# Patient Record
Sex: MALE | Race: WHITE | Employment: FULL TIME | URBAN - METROPOLITAN AREA
[De-identification: names, ages, dates, MRNs, and addresses within clinical notes are randomized per-mention and may not be internally consistent; named-entity substitution may affect disease eponyms.]

---

## 2017-01-06 RX ORDER — METFORMIN HYDROCHLORIDE 500 MG/1
TABLET, EXTENDED RELEASE ORAL
Qty: 30 TAB | Refills: 2 | Status: SHIPPED | OUTPATIENT
Start: 2017-01-06 | End: 2018-02-21 | Stop reason: ALTCHOICE

## 2017-07-24 ENCOUNTER — OFFICE VISIT (OUTPATIENT)
Dept: INTERNAL MEDICINE CLINIC | Age: 42
End: 2017-07-24

## 2017-07-24 VITALS
OXYGEN SATURATION: 97 % | SYSTOLIC BLOOD PRESSURE: 142 MMHG | WEIGHT: 315 LBS | HEIGHT: 73 IN | DIASTOLIC BLOOD PRESSURE: 86 MMHG | TEMPERATURE: 98.5 F | RESPIRATION RATE: 15 BRPM | HEART RATE: 80 BPM | BODY MASS INDEX: 41.75 KG/M2

## 2017-07-24 DIAGNOSIS — J20.9 BRONCHITIS WITH BRONCHOSPASM: Primary | ICD-10-CM

## 2017-07-24 DIAGNOSIS — R05.9 COUGH: ICD-10-CM

## 2017-07-24 RX ORDER — PREDNISONE 20 MG/1
TABLET ORAL
Qty: 13 TAB | Refills: 0 | Status: SHIPPED | OUTPATIENT
Start: 2017-07-24 | End: 2018-02-21 | Stop reason: ALTCHOICE

## 2017-07-24 RX ORDER — BENZONATATE 200 MG/1
200 CAPSULE ORAL
Qty: 21 CAP | Refills: 0 | Status: SHIPPED | OUTPATIENT
Start: 2017-07-24 | End: 2017-07-31

## 2017-07-24 RX ORDER — ALBUTEROL SULFATE 90 UG/1
1 AEROSOL, METERED RESPIRATORY (INHALATION)
Qty: 1 INHALER | Refills: 0 | Status: SHIPPED | OUTPATIENT
Start: 2017-07-24 | End: 2017-08-03

## 2017-07-24 NOTE — PROGRESS NOTES
Written by Marni Cole, as dictated by Dr. Carmine Khan MD.    Elena Walden is a 36 y.o. male. HPI  The patient comes in today c/o cough, causing a headache. He started on a Z-nicolasa last week, but he started running a fever 2 days after starting. He finished the Z-nicolasa on Saturday. He has tried a week of Mucinex, Sudafed, Robitussin, tessalon perles, neti pot TID, which helped for a little bit sxs always returned. He sometimes experiences a choking sensation during a coughing fit. He has not been taking Zyrtec or any other OTC allergy medication. He experienced wheezing, which went away after he finished the Z-nicolasa. He has gained weight since his last visit, from 340 lbs in 11/2016 to 355 lbs today. He spends a lot of time at his desk, and feels very tired lately. Patient Active Problem List   Diagnosis Code    Essential hypertension I10    Type 2 diabetes mellitus without complication ([facility] H34.5    Morbid obesity ([facility] E66.01        Current Outpatient Prescriptions on File Prior to Visit   Medication Sig Dispense Refill    metFORMIN ER (GLUCOPHAGE XR) 500 mg tablet TAKE 1 TABLET BY MOUTH DAILY. 30 Tab 2    multivitamin (ONE A DAY) tablet Take 1 Tab by mouth daily.  metFORMIN ER (GLUCOPHAGE XR) 500 mg tablet TAKE 1 TABLET BY MOUTH DAILY. 30 Tab 2    VIT B COMP-C-FA-IRON-VIT E PO Take  by mouth. No current facility-administered medications on file prior to visit.         No Known Allergies    Past Medical History:   Diagnosis Date    Diabetes ([facility]     Hypertension        Past Surgical History:   Procedure Laterality Date    HX TONSILLECTOMY  200       Family History   Problem Relation Age of Onset    Hypertension Mother     Headache Mother     Headache Sister     Diabetes Maternal Grandfather        Social History     Social History    Marital status:      Spouse name: N/A    Number of children: N/A    Years of education: N/A Occupational History    Not on file. Social History Main Topics    Smoking status: Never Smoker    Smokeless tobacco: Never Used    Alcohol use No    Drug use: No    Sexual activity: Yes     Partners: Female     Other Topics Concern    Not on file     Social History Narrative         Review of Systems   Constitutional: Negative for malaise/fatigue. HENT: Negative for congestion. Respiratory: Positive for cough. Negative for shortness of breath and wheezing. Musculoskeletal: Negative for joint pain and myalgias. Neurological: Negative for weakness. Visit Vitals    /86 (BP 1 Location: Left arm, BP Patient Position: Sitting)    Pulse 80    Temp 98.5 °F (36.9 °C) (Oral)    Resp 15    Ht 6' 1\" (1.854 m)    Wt (!) 355 lb 12.8 oz (161.4 kg)    SpO2 97%    BMI 46.94 kg/m2       Physical Exam   Constitutional: He is oriented to person, place, and time. He appears well-developed. No distress. Morbidly obese   HENT:   Right Ear: External ear normal.   Left Ear: External ear normal.   Eyes: Conjunctivae and EOM are normal. Right eye exhibits no discharge. Left eye exhibits no discharge. Neck: Normal range of motion. Neck supple. Cardiovascular: Normal rate and regular rhythm. Pulmonary/Chest: Effort normal. He has no wheezes. BL decreased breath sounds. No rhonchi. Abdominal: Soft. Bowel sounds are normal. There is no tenderness. Lymphadenopathy:     He has no cervical adenopathy. Neurological: He is alert and oriented to person, place, and time. Skin: He is not diaphoretic. Psychiatric: He has a normal mood and affect. His behavior is normal.   Nursing note and vitals reviewed. ASSESSMENT and PLAN    ICD-10-CM ICD-9-CM    1. Bronchitis with bronchospasm J20.9 490 predniSONE (DELTASONE) 20 mg tablet sent to pharmacy      albuterol (PROVENTIL HFA, VENTOLIN HFA, PROAIR HFA) 90 mcg/actuation inhaler sent to pharmacy   2.  Cough R05 786.2 benzonatate (TESSALON) 200 mg capsule sent to pharmacy    I want him to start on an albuterol inhaler BID to TID. Pt just finished a Z-nicolasa 2 days ago, and is experiencing post-infectious cough due to inflammation of bronchioles, so we will give him steroids as he still has some chest tightness. and steroids to open up his lungs. I want the patient to take the medication po as follows: 3 pills x 1 day, 2 pills x 2 days, 1 pill x 2 days and 1/2 pill x 1 day. The patient was advised to take this medication with food. He should also take Zyrtec every day for 2 weeks and continue to take tessalon perles. This plan was reviewed with the patient and patient agrees. All questions were answered. This scribe documentation was reviewed by me and accurately reflects the examination and decisions made by me. This note will not be viewable in [address]

## 2017-07-24 NOTE — PROGRESS NOTES
Chief Complaint   Patient presents with    Cough     states that he has had a cough for seven weeks, has tried sudafed robitussin and saw the tele doc and started a z nicolasa and it has not gotten better. running a low grade fever almost every evening. has a lot of drainage and headache from coughing so much.

## 2017-07-24 NOTE — MR AVS SNAPSHOT
Visit Information Date & Time Provider Department Dept. Phone Encounter #  
 7/24/2017 12:45 PM Rosa Cho MD [facility] Internal Medicine [phone] [id number] Upcoming Health Maintenance Date Due  
 EYE EXAM RETINAL OR DILATED Q1 7/16/1984 DTaP/Tdap/Td series (1 - Tdap) 7/16/1995 FOOT EXAM Q1 11/20/2016 HEMOGLOBIN A1C Q6M 3/19/2017 INFLUENZA AGE 9 TO ADULT 8/1/2017 LIPID PANEL Q1 9/19/2017 MICROALBUMIN Q1 11/14/2017 Allergies as of 7/24/2017  Review Complete On: 7/24/2017 By: Rosa Cho MD  
 No Known Allergies Current Immunizations  Never Reviewed Name Date Influenza Vaccine Intradermal PF 11/20/2015 Not reviewed this visit You Were Diagnosed With   
  
 Codes Comments Bronchitis with bronchospasm    -  Primary ICD-10-CM: J20.9 ICD-9-CM: 819 Cough     ICD-10-CM: R05 ICD-9-CM: 640. 2 Vitals BP Pulse Temp Resp Height(growth percentile) Weight(growth percentile) 142/86 (BP 1 Location: Left arm, BP Patient Position: Sitting) 80 98.5 °F (36.9 °C) (Oral) 15 6' 1\" (1.854 m) (!) 355 lb 12.8 oz (161.4 kg) SpO2 BMI Smoking Status 97% 46.94 kg/m2 Never Smoker BMI and BSA Data Body Mass Index Body Surface Area 46.94 kg/m 2 2.88 m 2 Preferred Pharmacy Pharmacy Name Phone [id number] [place] [address] [phone] Your Updated Medication List  
  
   
This list is accurate as of: 7/24/17  1:25 PM.  Always use your most recent med list.  
  
  
  
  
 albuterol 90 mcg/actuation inhaler Commonly known as:  PROVENTIL HFA, VENTOLIN HFA, PROAIR HFA Take 1 Puff by inhalation every six (6) hours as needed for Wheezing for up to 10 days. benzonatate 200 mg capsule Commonly known as:  TESSALON Take 1 Cap by mouth three (3) times daily as needed for Cough for up to 7 days. * metFORMIN  mg tablet Commonly known as:  GLUCOPHAGE XR  
TAKE 1 TABLET BY MOUTH DAILY. * metFORMIN  mg tablet Commonly known as:  GLUCOPHAGE XR  
TAKE 1 TABLET BY MOUTH DAILY. multivitamin tablet Commonly known as:  ONE A DAY Take 1 Tab by mouth daily. predniSONE 20 mg tablet Commonly known as:  Nilda López Prednisone 60 mg po x 2 days, 40 mg po x 2 days, 20 mg po x 2 days, 10 mg po x 1 day then stop. VIT B COMP-C-FA-IRON-VIT E PO Take  by mouth. * Notice: This list has 2 medication(s) that are the same as other medications prescribed for you. Read the directions carefully, and ask your doctor or other care provider to review them with you. Prescriptions Sent to Pharmacy Refills  
 predniSONE (DELTASONE) 20 mg tablet 0 Sig: Prednisone 60 mg po x 2 days, 40 mg po x 2 days, 20 mg po x 2 days, 10 mg po x 1 day then stop. Class: Normal  
 Pharmacy: [facility]/pharmacy #[id number]- [address] Ph #: [phone]  
 albuterol (PROVENTIL HFA, VENTOLIN HFA, PROAIR HFA) 90 mcg/actuation inhaler 0 Sig: Take 1 Puff by inhalation every six (6) hours as needed for Wheezing for up to 10 days. Class: Normal  
 Pharmacy: [facility]/pharmacy #[id number]- Bernie Severe, [address] [place] [address] Ph #: [phone] Route: Inhalation  
 benzonatate (TESSALON) 200 mg capsule 0 Sig: Take 1 Cap by mouth three (3) times daily as needed for Cough for up to 7 days. Class: Normal  
 Pharmacy: [facility]/pharmacy #[id number]- Bernie Severe, [address] [place] [address] Ph #: [phone] Route: Oral  
  
Introducing [facility] & HEALTH SERVICES! Dear Ana Caballero: 
Thank you for requesting a [employer] account. Our records indicate that you already have an active [employer] account. You can access your account anytime at [email or web address] Did you know that you can access your hospital and ER discharge instructions at any time in [employer]? You can also review all of your test results from your hospital stay or ER visit. Additional Information If you have questions, please visit the Frequently Asked Questions section of the [employer] website at [email or web address] Remember, [employer] is NOT to be used for urgent needs. For medical emergencies, dial 911. Now available from your iPhone and Android! Please provide this summary of care documentation to your next provider. Your primary care clinician is listed as Gurpreet Hanley. If you have any questions after today's visit, please call [phone].

## 2018-02-21 ENCOUNTER — OFFICE VISIT (OUTPATIENT)
Dept: INTERNAL MEDICINE CLINIC | Age: 43
End: 2018-02-21

## 2018-02-21 VITALS
TEMPERATURE: 98.2 F | WEIGHT: 315 LBS | SYSTOLIC BLOOD PRESSURE: 142 MMHG | BODY MASS INDEX: 41.75 KG/M2 | OXYGEN SATURATION: 96 % | HEIGHT: 73 IN | HEART RATE: 88 BPM | DIASTOLIC BLOOD PRESSURE: 100 MMHG | RESPIRATION RATE: 16 BRPM

## 2018-02-21 DIAGNOSIS — R10.33 PERIUMBILICAL ABDOMINAL PAIN: Primary | ICD-10-CM

## 2018-02-21 DIAGNOSIS — R11.0 NAUSEA: ICD-10-CM

## 2018-02-21 DIAGNOSIS — F41.8 DEPRESSION WITH ANXIETY: ICD-10-CM

## 2018-02-21 DIAGNOSIS — E11.9 DIABETES MELLITUS TYPE 2, DIET-CONTROLLED (HCC): ICD-10-CM

## 2018-02-21 DIAGNOSIS — E66.01 MORBID OBESITY (HCC): ICD-10-CM

## 2018-02-21 DIAGNOSIS — I10 ESSENTIAL HYPERTENSION: ICD-10-CM

## 2018-02-21 DIAGNOSIS — R19.8 UMBILICAL BLEEDING: ICD-10-CM

## 2018-02-21 RX ORDER — BUPROPION HYDROCHLORIDE 75 MG/1
75 TABLET ORAL 2 TIMES DAILY
Qty: 60 TAB | Refills: 0 | Status: SHIPPED | OUTPATIENT
Start: 2018-02-21 | End: 2018-03-23

## 2018-02-21 NOTE — PROGRESS NOTES
Kalia Bonds is a 36 y.o. male    Chief Complaint   Patient presents with    Abdominal Pain     yesterday the pt got home and noticed a pain in his belly button area that gets worse when he sits down. among messing with it last night it started to bleed a little bit. When laying down to sleep pt states it hurt at a 7 and he considered going to the ER. This morning pain is at a 2 on p.s. and the area effected is \"crusty\"        1. Have you been to the ER, urgent care clinic since your last visit? Hospitalized since your last visit? No    2. Have you seen or consulted any other health care providers outside of the [address] [place] [address] since your last visit? Include any pap smears or colon screening.   Yes, [facility] for flu symptoms on 2/07/18 and was diagnosed with bronchitis     Health Maintenance Due   Topic Date Due    EYE EXAM RETINAL OR DILATED Q1  07/16/1984    DTaP/Tdap/Td series (1 - Tdap) 07/16/1995    FOOT EXAM Q1  11/20/2016    HEMOGLOBIN A1C Q6M  03/19/2017    Influenza Age 5 to Adult  08/01/2017    LIPID PANEL Q1  09/19/2017    MICROALBUMIN Q1  11/14/2017     Visit Vitals    BP (!) 142/100 (BP 1 Location: Left arm, BP Patient Position: Sitting)    Pulse 88    Temp 98.2 °F (36.8 °C) (Oral)    Resp 16    Ht 6' 1\" (1.854 m)    Wt (!) 355 lb 6.4 oz (161.2 kg)    SpO2 96%    BMI 46.89 kg/m2

## 2018-02-21 NOTE — PROGRESS NOTES
Written by Krissy Monte, as dictated by Dr. Mel Sterling MD.    Lis Frederick is a 36 y.o. male. HPI  The patient comes in today c/o abdominal pain since yesterday 02/20, which worsens when he sits down and hurts less when he is standing or laying down. He also notices the pain increases when he is in the act of standing up or bends over to pick things up. He noticed some bleeding in his belly button, which he poured peroxide on. He does not drink alcohol, and drinks mostly water and ginger ale. He has been feeling nauseous lately, which he has attributed to anxiety. His BP is high today at 142/100. He states it was 132/90 when he went to [facility] recently. He has not been checking his BP at home. He does experience headaches associated with stress, but denies blurred vision, though he has been experiencing episodes of double vision in the past for which he has seen ophthalmology and neurology. An MRI was ordered but he was not able to fit in the machine so he did not have it done, and he has been trying to lose weight since then. He is not taking any medication at this time, including metformin. He has been feeling anxious lately due to his job, which as stated earlier has been causing headaches, nausea, increased BP, neck pain, and general malaise: he states he has not felt well for a long time and feels \"sick to his stomach\" every day. He has been trying to make changes in his attitude. He states he tends to stress eat. Patient Active Problem List   Diagnosis Code    Essential hypertension I10    Type 2 diabetes mellitus without complication ([facility] C96.2    Morbid obesity ([facility] E66.01        Current Outpatient Prescriptions on File Prior to Visit   Medication Sig Dispense Refill    multivitamin (ONE A DAY) tablet Take 1 Tab by mouth daily.  VIT B COMP-C-FA-IRON-VIT E PO Take  by mouth.        No current facility-administered medications on file prior to visit. Past Medical History:   Diagnosis Date    Diabetes ([address]     Hypertension        Past Surgical History:   Procedure Laterality Date    HX TONSILLECTOMY  200       Family History   Problem Relation Age of Onset    Hypertension Mother     Headache Mother     Headache Sister     Diabetes Maternal Grandfather        Social History     Social History    Marital status:      Spouse name: N/A    Number of children: N/A    Years of education: N/A     Occupational History    Not on file. Social History Main Topics    Smoking status: Never Smoker    Smokeless tobacco: Never Used    Alcohol use No    Drug use: No    Sexual activity: Yes     Partners: Female     Other Topics Concern    Not on file     Social History Narrative       Review of Systems   Constitutional: Negative for malaise/fatigue. HENT: Negative for congestion. Eyes: Positive for double vision. Negative for blurred vision and pain. Respiratory: Negative for cough and shortness of breath. Gastrointestinal: Positive for abdominal pain. Negative for heartburn. Musculoskeletal: Negative for joint pain and myalgias. Neurological: Negative for dizziness, tingling, sensory change, weakness and headaches. Psychiatric/Behavioral: Negative for depression, memory loss and substance abuse. The patient is nervous/anxious. Visit Vitals    BP (!) 142/100 (BP 1 Location: Left arm, BP Patient Position: Sitting)    Pulse 88    Temp 98.2 °F (36.8 °C) (Oral)    Resp 16    Ht 6' 1\" (1.854 m)    Wt (!) 355 lb 6.4 oz (161.2 kg)    SpO2 96%    BMI 46.89 kg/m2       Physical Exam   Constitutional: He is oriented to person, place, and time. He appears well-developed. No distress. Morbidly obese   HENT:   Right Ear: External ear normal.   Left Ear: External ear normal.   Eyes: Conjunctivae and EOM are normal. Right eye exhibits no discharge. Left eye exhibits no discharge.    Neck: Normal range of motion. Neck supple. Cardiovascular: Normal rate and regular rhythm. Pulmonary/Chest: Effort normal and breath sounds normal. He has no wheezes. Abdominal: Soft. Bowel sounds are normal. There is tenderness. Lymphadenopathy:     He has no cervical adenopathy. Neurological: He is alert and oriented to person, place, and time. Skin: He is not diaphoretic. Psychiatric: He has a normal mood and affect. His behavior is normal.   Nursing note and vitals reviewed. ASSESSMENT and PLAN    ICD-10-CM ICD-9-CM    1. Periumbilical abdominal pain R10.33 789.05 LIPASE    Discussed that mid-abdominal pain may be associated with his pancreas. He should follow a liquid diet for a few days and not have any carbohydrates or protein. Will check lipase today. 2. Umbilical bleeding J27.2 789.9 If he notices more blood in his belly button he should let me know. 3. Nausea R11.0 787.02 Will check lipase today. 4. Essential hypertension I10 401.9 I want him to check his BP regularly and record 4-5 readings. He should return in 2 weeks to make a plan and go over labs. 5. Morbid obesity ([facility] E66.01 278.01 Wellbutrin can help him lose weight. 6. Diabetes mellitus type 2, diet-controlled (HCC) S02.5 115.72 METABOLIC PANEL, COMPREHENSIVE      CBC W/O DIFF      LIPID PANEL      HEMOGLOBIN A1C WITH EAG      TSH 3RD GENERATION    He is fasted today so we will repeat basic labs today. 7. Depression with anxiety F41.8 300.4 buPROPion (WELLBUTRIN) 75 mg tablet sent to pharmacy    Once his abdominal pain eases, I want him to start on Wellbutrin 75 mg once per day, then increase to BID after 3-4 days if he does not experience any adverse effects. Side effects discussed with patient He should let me know how it is working when he returns in 2 weeks. Discussed this can help him lose weight as well. This plan was reviewed with the patient and patient agrees. All questions were answered.     This scribe documentation was reviewed by me and accurately reflects the examination and decisions made by me. This note will not be viewable in [address]

## 2018-02-21 NOTE — MR AVS SNAPSHOT
[facility] [address] [facility] Rivers [address] [place] [address] 
[phone] Patient: Padmini Hogan MRN: [id number] :1975 Visit Information Date & Time Provider Department Dept. Phone Encounter #  
 2018 11:15 AM Araceli Dia MD [facility] Internal Medicine [phone] [id number] Upcoming Health Maintenance Date Due  
 EYE EXAM RETINAL OR DILATED Q1 1984 DTaP/Tdap/Td series (1 - Tdap) 1995 FOOT EXAM Q1 2016 HEMOGLOBIN A1C Q6M 3/19/2017 Influenza Age 5 to Adult 2017 LIPID PANEL Q1 2017 MICROALBUMIN Q1 2017 Allergies as of 2018  Review Complete On: 2018 By: Araceli Dia MD  
 No Known Allergies Current Immunizations  Never Reviewed Name Date Influenza Vaccine Intradermal PF 2015 Not reviewed this visit You Were Diagnosed With   
  
 Codes Comments Periumbilical abdominal pain    -  Primary ICD-10-CM: R10.33 ICD-9-CM: 789.05 Umbilical bleeding     DZK-52-SY: R19.8 ICD-9-CM: 789.9 Nausea     ICD-10-CM: R11.0 ICD-9-CM: 787.02 Essential hypertension     ICD-10-CM: I10 
ICD-9-CM: 401.9 Morbid obesity ([facility]     ICD-10-CM: E66.01 
ICD-9-CM: 278.01 Diabetes mellitus type 2, diet-controlled ([facility]     ICD-10-CM: E11.9 ICD-9-CM: 250.00 Depression with anxiety     ICD-10-CM: F41.8 ICD-9-CM: 300.4 Vitals BP Pulse Temp Resp Height(growth percentile) Weight(growth percentile) (!) 142/100 (BP 1 Location: Left arm, BP Patient Position: Sitting) 88 98.2 °F (36.8 °C) (Oral) 16 6' 1\" (1.854 m) (!) 355 lb 6.4 oz (161.2 kg) SpO2 BMI Smoking Status 96% 46.89 kg/m2 Never Smoker Vitals History BMI and BSA Data Body Mass Index Body Surface Area  
 46.89 kg/m 2 2.88 m 2 Preferred Pharmacy Pharmacy Name Phone  [facility]Y #[id number]- [place] [phone] Your Updated Medication List  
  
   
This list is accurate as of 2/21/18 12:04 PM.  Always use your most recent med list.  
  
  
  
  
 buPROPion 75 mg tablet Commonly known as:  [facility] Take 1 Tab by mouth two (2) times a day for 30 days. multivitamin tablet Commonly known as:  ONE A DAY Take 1 Tab by mouth daily. VIT B COMP-C-FA-IRON-VIT E PO Take  by mouth. Prescriptions Sent to Pharmacy Refills buPROPion (WELLBUTRIN) 75 mg tablet 0 Sig: Take 1 Tab by mouth two (2) times a day for 30 days. Class: Normal  
 Pharmacy: [facility]/pharmacy #[id number]- [facility], [address] [place] [address] #: [phone] Route: Oral  
  
We Performed the Following CBC W/O DIFF [08184 CPT(R)] HEMOGLOBIN A1C WITH EAG [71058 CPT(R)] LIPASE [id number] CPT(R)] LIPID PANEL [27667 CPT(R)] METABOLIC PANEL, COMPREHENSIVE [67176 CPT(R)] TSH 3RD GENERATION [82984 CPT(R)] Introducing [facility] & HEALTH SERVICES! Dear Sandra Potter: 
Thank you for requesting a [employer] account. Our records indicate that you already have an active [employer] account. You can access your account anytime at [email or web address] Did you know that you can access your hospital and ER discharge instructions at any time in [employer]? You can also review all of your test results from your hospital stay or ER visit. Additional Information If you have questions, please visit the Frequently Asked Questions section of the [employer] website at [email or web address] Remember, [employer] is NOT to be used for urgent needs. For medical emergencies, dial 911. Now available from your iPhone and Android! Please provide this summary of care documentation to your next provider. Your primary care clinician is listed as Mele Clayton. If you have any questions after today's visit, please call [phone].

## 2018-02-22 LAB
ALBUMIN SERPL-MCNC: 4.5 G/DL (ref 3.5–5.5)
ALBUMIN/GLOB SERPL: 1.6 {RATIO} (ref 1.2–2.2)
ALP SERPL-CCNC: 62 IU/L (ref 39–117)
ALT SERPL-CCNC: 39 IU/L (ref 0–44)
AST SERPL-CCNC: 27 IU/L (ref 0–40)
BILIRUB SERPL-MCNC: 0.3 MG/DL (ref 0–1.2)
BUN SERPL-MCNC: 11 MG/DL (ref 6–24)
BUN/CREAT SERPL: 13 (ref 9–20)
CALCIUM SERPL-MCNC: 9.5 MG/DL (ref 8.7–10.2)
CHLORIDE SERPL-SCNC: 104 MMOL/L (ref 96–106)
CHOLEST SERPL-MCNC: 168 MG/DL (ref 100–199)
CO2 SERPL-SCNC: 21 MMOL/L (ref 18–29)
CREAT SERPL-MCNC: 0.87 MG/DL (ref 0.76–1.27)
ERYTHROCYTE [DISTWIDTH] IN BLOOD BY AUTOMATED COUNT: 15.3 % (ref 12.3–15.4)
EST. AVERAGE GLUCOSE BLD GHB EST-MCNC: 157 MG/DL
GFR SERPLBLD CREATININE-BSD FMLA CKD-EPI: 106 ML/MIN/{1.73_M2}
GFR SERPLBLD CREATININE-BSD FMLA CKD-EPI: 122 ML/MIN/{1.73_M2}
GLOBULIN SER CALC-MCNC: 2.8 G/L (ref 1.5–4.5)
GLUCOSE SERPL-MCNC: 110 MG/DL (ref 65–99)
HBA1C MFR BLD: 7.1 % (ref 4.8–5.6)
HCT VFR BLD AUTO: 44.1 % (ref 37.5–51)
HDLC SERPL-MCNC: 44 MG/DL
HGB BLD-MCNC: 13.9 G/DL (ref 13–17.7)
INTERPRETATION, 910389: NORMAL
LDLC SERPL CALC-MCNC: 107 MG/DL (ref 0–99)
LIPASE SERPL-CCNC: 36 U/L (ref 13–78)
Lab: NORMAL
MCH RBC QN AUTO: 25.6 PG (ref 26.6–33)
MCHC RBC AUTO-ENTMCNC: 31.5 G/DL (ref 31.5–35.7)
MCV RBC AUTO: 81 FL (ref 79–97)
PLATELET # BLD AUTO: 347 X10E3/UL (ref 150–379)
POTASSIUM SERPL-SCNC: 4.4 MMOL/L (ref 3.5–5.2)
PROT SERPL-MCNC: 7.3 G/DL (ref 6–8.5)
RBC # BLD AUTO: 5.43 X10E6/UL (ref 4.14–5.8)
SODIUM SERPL-SCNC: 142 MMOL/L (ref 134–144)
TRIGL SERPL-MCNC: 85 MG/DL (ref 0–149)
TSH SERPL DL<=0.005 MIU/L-ACNC: 0.96 UIU/ML (ref 0.45–4.5)
VLDLC SERPL CALC-MCNC: 17 MG/DL (ref 5–40)
WBC # BLD AUTO: 8.9 X10E3/UL (ref 3.4–10.8)

## 2018-02-23 NOTE — PROGRESS NOTES
Debra Emery, hope you are feeling better by now. Your diabetes number has gone up. Will discuss plan on your next visit.

## 2018-03-06 ENCOUNTER — OFFICE VISIT (OUTPATIENT)
Dept: INTERNAL MEDICINE CLINIC | Age: 43
End: 2018-03-06

## 2018-03-06 VITALS
BODY MASS INDEX: 41.75 KG/M2 | HEIGHT: 73 IN | DIASTOLIC BLOOD PRESSURE: 90 MMHG | OXYGEN SATURATION: 96 % | WEIGHT: 315 LBS | TEMPERATURE: 98.7 F | HEART RATE: 86 BPM | RESPIRATION RATE: 22 BRPM | SYSTOLIC BLOOD PRESSURE: 132 MMHG

## 2018-03-06 DIAGNOSIS — R06.83 SNORES: ICD-10-CM

## 2018-03-06 DIAGNOSIS — E11.9 DIABETES MELLITUS TYPE 2, DIET-CONTROLLED (HCC): ICD-10-CM

## 2018-03-06 DIAGNOSIS — E66.01 MORBID OBESITY (HCC): ICD-10-CM

## 2018-03-06 DIAGNOSIS — I10 ESSENTIAL HYPERTENSION: ICD-10-CM

## 2018-03-06 DIAGNOSIS — R10.84 GENERALIZED ABDOMINAL PAIN: Primary | ICD-10-CM

## 2018-03-06 RX ORDER — LISINOPRIL 5 MG/1
5 TABLET ORAL DAILY
Qty: 30 TAB | Refills: 0 | Status: SHIPPED | OUTPATIENT
Start: 2018-03-06 | End: 2018-04-05

## 2018-03-06 NOTE — MR AVS SNAPSHOT
[address] [facility] [address] 
[phone] Patient: Dwight Bernardo MRN: [id number] :[id number] Visit Information Date & Time Provider Department Dept. Phone Encounter #  
 3/6/2018  9:15 AM Tamia Ragsdale, [address] [facility],[address] Internal Medicine [phone] [id number] Upcoming Health Maintenance Date Due  
 EYE EXAM RETINAL OR DILATED Q1 1984 DTaP/Tdap/Td series (1 - Tdap) 1995 FOOT EXAM Q1 2016 MICROALBUMIN Q1 2017 HEMOGLOBIN A1C Q6M 2018 LIPID PANEL Q1 2019 Allergies as of 3/6/2018  Review Complete On: 3/6/2018 By: Tamia Ragsdale MD  
 No Known Allergies Current Immunizations  Never Reviewed Name Date Influenza Vaccine Intradermal PF 2015 Not reviewed this visit You Were Diagnosed With   
  
 Codes Comments Generalized abdominal pain    -  Primary ICD-10-CM: R10.84 ICD-9-CM: 789.07 Diabetes mellitus type 2, diet-controlled ([facility]     ICD-10-CM: E11.9 ICD-9-CM: 250.00 Morbid obesity ([facility]     ICD-10-CM: E66.01 
ICD-9-CM: 278.01 Essential hypertension     ICD-10-CM: I10 
ICD-9-CM: 401.9 Snores     ICD-10-CM: R06.83 
ICD-9-CM: 786.09 Vitals BP Pulse Temp Resp Height(growth percentile) Weight(growth percentile) 132/90 (BP 1 Location: Left arm, BP Patient Position: Sitting) 86 98.7 °F (37.1 °C) (Oral) 22 6' 1\" (1.854 m) 347 lb 3.2 oz (157.5 kg) SpO2 BMI Smoking Status 96% 45.81 kg/m2 Never Smoker Vitals History BMI and BSA Data Body Mass Index Body Surface Area 45.81 kg/m 2 2.85 m 2 Preferred Pharmacy Pharmacy Name Phone [id number] [place], [address] [address] [phone] Your Updated Medication List  
  
   
This list is accurate as of 3/6/18 10:11 AM.  Always use your most recent med list.  
  
  
  
  
 buPROPion 75 mg tablet Commonly known as:  [facility] Take 1 Tab by mouth two (2) times a day for 30 days. lisinopril 5 mg tablet Commonly known as:  [facility] Take 1 Tab by mouth daily for 30 days. VIT B COMP-C-FA-IRON-VIT E PO Take  by mouth. Prescriptions Sent to Pharmacy Refills  
 lisinopril (PRINIVIL, ZESTRIL) 5 mg tablet 0 Sig: Take 1 Tab by mouth daily for 30 days. Class: Normal  
 Pharmacy: [id number]- [place], [address] [place] [address] #: [phone] Route: Oral  
  
We Performed the Following SLEEP MEDICINE REFERRAL [CSA608 Custom] Comments:  
 Needs sleep apnea evaluation To-Do List   
 03/06/2018 Imaging:  CT ABD PELV W WO CONT Referral Information Referral ID Referred By Referred To  
  
 [id number] Malgorzata GARCIA MD   
   [address] [place] [address] Phone: [phone] Fax: [phone] Visits Status Start Date End Date 1 New Request 3/6/18 3/6/19 If your referral has a status of pending review or denied, additional information will be sent to support the outcome of this decision. Referral ID Referred By Referred To  
 [id number] Brandie Woo Not Available Visits Status Start Date End Date 1 New Request 3/6/18 3/6/19 If your referral has a status of pending review or denied, additional information will be sent to support the outcome of this decision. Introducing [facility]! Dear Jas Jimenez: 
Thank you for requesting a Mingly account. Our records indicate that you already have an active Mingly account. You can access your account anytime at [email or web address] Did you know that you can access your hospital and ER discharge instructions at any time in Mingly? You can also review all of your test results from your hospital stay or ER visit. Additional Information If you have questions, please visit the Frequently Asked Questions section of the Paybubblehart website at [email or web address] Remember, Audemat is NOT to be used for urgent needs. For medical emergencies, dial 911. Now available from your iPhone and Android! Please provide this summary of care documentation to your next provider. Your primary care clinician is [facility] as [facility]. If you have any questions after today's visit, please call [phone].

## 2018-03-06 NOTE — PROGRESS NOTES
Written by Eliezer Brownlee, as dictated by Dr. Clement Lopez MD.    Sofie Shore is a 36 y.o. male. HPI  The patient comes in today for a follow-up. His BP is high today at 132/90. He has been checking at home and it has been running around 130-140/85-90s. He has taken lisinopril in the past, but was taken off as his BP had been dropping, though he had lost weight around that time. He has been checking his fasting BS and it has been in the 80s-90s. His HA1c was 7.1% on 02/21, up from 6.7% in 09/2016. He has taken metformin in the past, but stopped when his HA1c went down. He has lost weight, from 355 lbs on 02/21 to 347 lbs today. He has been experiencing nausea and abdominal pain, and he has not been eating as much lately as a result. He notices that this pain is associated with certain foods, and is fine when he eats salads. He drinks a glass of milk every day. His lipase on 02/21 was normal at 36. He has been taking Wellbutrin 75 mg BID, and feels like he has been managing stress much better. He snores at night, does not sleep well, and feels tired during the day. He has been referred to sleep medicine but he has not had a sleep study done. He did not get a flu shot this season. He has not been taking a multivitamin: Wellbutrin is the only medication he is currently taking. Patient Active Problem List   Diagnosis Code    Essential hypertension I10    Type 2 diabetes mellitus without complication ([facility] X10.9    Mor[facility] E66.01        Current Outpatient Prescriptions on File Prior to Visit   Medication Sig Dispense Refill    buPROPion (WELLBUTRIN) 75 mg tablet Take 1 Tab by mouth two (2) times a day for 30 days. 60 Tab 0    VIT B COMP-C-FA-IRON-VIT E PO Take  by mouth. No current facility-administered medications on file prior to visit.         Past Medical History:   Diagnosis Date    Diabetes ([facility]     Hypertension Past Surgical History:   Procedure Laterality Date    HX TONSILLECTOMY  200       Family History   Problem Relation Age of Onset    Hypertension Mother     Headache Mother     Headache Sister     Diabetes Maternal Grandfather        Social History     Social History    Marital status:      Spouse name: N/A    Number of children: N/A    Years of education: N/A     Occupational History    Not on file. Social History Main Topics    Smoking status: Never Smoker    Smokeless tobacco: Never Used    Alcohol use No    Drug use: No    Sexual activity: Yes     Partners: Female     Other Topics Concern    Not on file     Social History Narrative       Office Visit on 02/21/2018   Component Date Value Ref Range Status    Lipase 02/21/2018 36  13 - 78 U/L Final    Glucose 02/21/2018 110* 65 - 99 mg/dL Final    BUN 02/21/2018 11  6 - 24 mg/dL Final    Creatinine 02/21/2018 0.87  0.76 - 1.27 mg/dL Final    GFR est non-AA 02/21/2018 106  >59 Final    GFR est AA 02/21/2018 122  >59 Final    BUN/Creatinine ratio 02/21/2018 13  9 - 20 Final    Sodium 02/21/2018 142  134 - 144 mmol/L Final    Potassium 02/21/2018 4.4  3.5 - 5.2 mmol/L Final    Chloride 02/21/2018 104  96 - 106 mmol/L Final    CO2 02/21/2018 21  18 - 29 mmol/L Final    Calcium 02/21/2018 9.5  8.7 - 10.2 mg/dL Final    Protein, total 02/21/2018 7.3  6.0 - 8.5 g/dL Final    Albumin 02/21/2018 4.5  3.5 - 5.5 g/dL Final    GLOBULIN, TOTAL 02/21/2018 2.8  1.5 - 4.5 Final    A-G Ratio 02/21/2018 1.6  1.2 - 2.2 Final    Bilirubin, total 02/21/2018 0.3  0.0 - 1.2 mg/dL Final    Alk.  phosphatase 02/21/2018 62  39 - 117 IU/L Final    AST (SGOT) 02/21/2018 27  0 - 40 IU/L Final    ALT (SGPT) 02/21/2018 39  0 - 44 IU/L Final    WBC 02/21/2018 8.9  3.4 - 10.8 x10E3/uL Final    RBC 02/21/2018 5.43  4.14 - 5.80 x10E6/uL Final    HGB 02/21/2018 13.9  13.0 - 17.7 g/dL Final    HCT 02/21/2018 44.1  37.5 - 51.0 % Final    MCV 02/21/2018 81  79 - 97 fL Final    MCH 02/21/2018 25.6* 26.6 - 33.0 pg Final    MCHC 02/21/2018 31.5  31.5 - 35.7 g/dL Final    RDW 02/21/2018 15.3  12.3 - 15.4 % Final    PLATELET 49/76/6592 011  150 - 379 x10E3/uL Final    Cholesterol, total 02/21/2018 168  100 - 199 mg/dL Final    Triglyceride 02/21/2018 85  0 - 149 mg/dL Final    HDL Cholesterol 02/21/2018 44  >39 mg/dL Final    VLDL, calculated 02/21/2018 17  5 - 40 Final    LDL, calculated 02/21/2018 107* 0 - 99 Final    Hemoglobin A1c 02/21/2018 7.1* 4.8 - 5.6 % Final    Estimated average glucose 02/21/2018 157   Final    TSH 02/21/2018 0.961  0.450 - 4.500 uIU/mL Final       Review of Systems   Constitutional: Positive for malaise/fatigue. HENT: Negative for congestion. Respiratory: Negative for cough and shortness of breath. Gastrointestinal: Positive for abdominal pain and nausea. Negative for heartburn. Musculoskeletal: Negative for joint pain and myalgias. Neurological: Negative for dizziness, tingling, sensory change, weakness and headaches. Psychiatric/Behavioral: Negative for depression, memory loss and substance abuse. The patient has insomnia. The patient is not nervous/anxious. Visit Vitals    /90 (BP 1 Location: Left arm, BP Patient Position: Sitting)    Pulse 86    Temp 98.7 °F (37.1 °C) (Oral)    Resp 22    Ht 6' 1\" (1.854 m)    Wt 347 lb 3.2 oz (157.5 kg)    SpO2 96%    BMI 45.81 kg/m2       Physical Exam   Constitutional: He is oriented to person, place, and time. He appears well-developed. No distress. Morbidly obese   HENT:   Right Ear: External ear normal.   Left Ear: External ear normal.   Eyes: Conjunctivae and EOM are normal. Right eye exhibits no discharge. Left eye exhibits no discharge. Neck: Normal range of motion. Neck supple. Cardiovascular: Normal rate and regular rhythm. Pulmonary/Chest: Effort normal and breath sounds normal. He has no wheezes. Abdominal: Soft.  Bowel sounds are normal. There is tenderness. Mid-abdominal tenderness   Lymphadenopathy:     He has no cervical adenopathy. Neurological: He is alert and oriented to person, place, and time. Skin: He is not diaphoretic. Psychiatric: He has a normal mood and affect. His behavior is normal.   Nursing note and vitals reviewed. ASSESSMENT and PLAN    ICD-10-CM ICD-9-CM    1. Generalized abdominal pain R10.84 789.07 CT ABD PELV W WO CONT    Discussed lactose intolerance can develop later in life, so I recommended he avoid all dairy products and see how he feels after a week or two. Abdominal/pelvic CT ordered. If CT is normal and he is still experiencing discomfort while avoiding lactose, I will refer him to GI for a colonoscopy. 2. Diabetes mellitus type 2, diet-controlled (HCC) E11.9 250.00 Discussed that metformin would be the best medication for his diabetes, but it may aggravate his abdominal pain. I will not start him on anything until his CT results are back. 3. Morbid obesity ([address] E66.01 278.01 Commended him on his weight loss, though it may be due to his recent GI issues. 4. Essential hypertension I10 401.9 lisinopril (PRINIVIL, ZESTRIL) 5 mg tablet sent to pharmacy    Discussed this may be due to his poor sleep due to snoring. I want him to start on lisinopril 5 mg.   5. Snores R06.83 786.09 SLEEP MEDICINE REFERRAL    Discussed the risks of leaving sleep apnea untreated. Referred to sleep medicine, and urged him to have a sleep study done. This plan was reviewed with the patient and patient agrees. All questions were answered. This scribe documentation was reviewed by me and accurately reflects the examination and decisions made by me. This note will not be viewable in [address]

## 2018-03-06 NOTE — PROGRESS NOTES
Pt here to be seen for his 2 week follow up appt. He is still experiencing abdominal pain and nausea. He is having lack of appetite. He states after eating he can have nausea and abdominal pains tend to get worse occasionally. Pt also has been taking his BP at home and has been getting elevated 'Y-215'F systolic and 99-61 diastolic. Chief Complaint   Patient presents with    Follow-up     2 week f/u elevated BP    Abdominal Pain     nausea      Visit Vitals    /90 (BP 1 Location: Left arm, BP Patient Position: Sitting)    Pulse 86    Temp 98.7 °F (37.1 °C) (Oral)    Resp 22    Ht 6' 1\" (1.854 m)    Wt 347 lb 3.2 oz (157.5 kg)    SpO2 96%    BMI 45.81 kg/m2     1. Have you been to the ER, urgent care clinic since your last visit? Hospitalized since your last visit? No     2. Have you seen or consulted any other health care providers outside of the [address] [place] [address] since your last visit? Include any pap smears or colon screening.  No

## 2018-03-15 ENCOUNTER — HOSPITAL ENCOUNTER (OUTPATIENT)
Dept: CT IMAGING | Age: 43
Discharge: HOME | End: 2018-03-15
Attending: INTERNAL MEDICINE
Payer: COMMERCIAL

## 2018-03-15 DIAGNOSIS — R10.84 GENERALIZED ABDOMINAL PAIN: ICD-10-CM

## 2018-03-15 PROCEDURE — 74177 CT ABD & PELVIS W/CONTRAST: CPT

## 2018-03-15 PROCEDURE — 74011000258 HC RX REV CODE- 258: Performed by: INTERNAL MEDICINE

## 2018-03-15 PROCEDURE — 74178 CT ABD&PLV WO CNTR FLWD CNTR: CPT

## 2018-03-15 PROCEDURE — 74011636320 HC RX REV CODE- 636/320: Performed by: INTERNAL MEDICINE

## 2018-03-15 PROCEDURE — 74011000255 HC RX REV CODE- 255: Performed by: INTERNAL MEDICINE

## 2018-03-15 RX ORDER — SODIUM CHLORIDE 9 MG/ML
50 INJECTION, SOLUTION INTRAVENOUS
Status: COMPLETED | OUTPATIENT
Start: 2018-03-15 | End: 2018-03-15

## 2018-03-15 RX ORDER — BARIUM SULFATE 20 MG/ML
900 SUSPENSION ORAL
Status: COMPLETED | OUTPATIENT
Start: 2018-03-15 | End: 2018-03-15

## 2018-03-15 RX ADMIN — BARIUM SULFATE 900 ML: 21 SUSPENSION ORAL at 08:39

## 2018-03-15 RX ADMIN — SODIUM CHLORIDE 50 ML/HR: 900 INJECTION, SOLUTION INTRAVENOUS at 08:39

## 2018-03-15 RX ADMIN — IOPAMIDOL 100 ML: 755 INJECTION, SOLUTION INTRAVENOUS at 08:39

## 2018-03-15 NOTE — PROGRESS NOTES
rBittany Shi, your CT scan showed kidney stone on the left side. If you are still in pain I can refer you to Urology. Also, you do have a fatty liver, another reason to loose weight.

## 2018-03-16 DIAGNOSIS — E11.9 TYPE 2 DIABETES MELLITUS WITHOUT COMPLICATION, WITHOUT LONG-TERM CURRENT USE OF INSULIN (HCC): Primary | ICD-10-CM

## 2018-03-16 RX ORDER — METFORMIN HYDROCHLORIDE 500 MG/1
500 TABLET ORAL 2 TIMES DAILY WITH MEALS
Qty: 60 TAB | Refills: 0 | Status: SHIPPED | OUTPATIENT
Start: 2018-03-16 | End: 2018-04-15 | Stop reason: SDUPTHER

## 2018-04-03 ENCOUNTER — HOSPITAL ENCOUNTER (OUTPATIENT)
Dept: SLEEP MEDICINE | Age: 43
Discharge: HOME | End: 2018-04-03
Payer: COMMERCIAL

## 2018-04-03 ENCOUNTER — OFFICE VISIT (OUTPATIENT)
Dept: SLEEP MEDICINE | Age: 43
End: 2018-04-03

## 2018-04-03 VITALS
SYSTOLIC BLOOD PRESSURE: 130 MMHG | BODY MASS INDEX: 41.75 KG/M2 | WEIGHT: 315 LBS | DIASTOLIC BLOOD PRESSURE: 85 MMHG | OXYGEN SATURATION: 96 % | HEART RATE: 81 BPM | HEIGHT: 73 IN

## 2018-04-03 DIAGNOSIS — I10 ESSENTIAL HYPERTENSION: ICD-10-CM

## 2018-04-03 DIAGNOSIS — G47.33 OSA (OBSTRUCTIVE SLEEP APNEA): Primary | ICD-10-CM

## 2018-04-03 PROCEDURE — 95806 SLEEP STUDY UNATT&RESP EFFT: CPT

## 2018-04-03 NOTE — PATIENT INSTRUCTIONS
[facility] [place]. [place], [address]  Tel.  [phone]  Fax. [address] [facility] 60  [place], [address] [facility]  Tel.  [phone]  Fax. [phone] [address]  Tel.  [phone]  Fax. [phone]     Sleep Apnea: After Your Visit  Your Care Instructions  Sleep apnea occurs when you frequently stop breathing for 10 seconds or longer during sleep. It can be mild to severe, based on the number of times per hour that you stop breathing or have slowed breathing. Blocked or narrowed airways in your nose, mouth, or throat can cause sleep apnea. Your airway can become blocked when your throat muscles and tongue relax during sleep. Sleep apnea is common, occurring in 1 out of 20 individuals. Individuals having any of the following characteristics should be evaluated and treated right away due to high risk and detrimental consequences from untreated sleep apnea:  1. Obesity  2. Congestive Heart failure  3. Atrial Fibrillation  4. Uncontrolled Hypertension  5. Type II Diabetes  6. Night-time Arrhythmias  7. Stroke  8. Pulmonary Hypertension  9. High-risk Driving Populations (pilots, truck drivers, etc.)  10. Patients Considering Weight-loss Surgery    How do you know you have sleep apnea? You probably have sleep apnea if you answer 'yes' to 3 or more of the following questions:  S - Have you been told that you Snore? T - Are you often Tired during the day? O - Has anyone Observed you stop breathing while sleeping? P- Do you have (or are being treated for) high blood Pressure? B - Are you obese (Body Mass Index > 35)? A - Is your Age 48years old or older? N - Is your Neck size greater than 16 inches? G - Are you male Gender? A sleep physician can prescribe a breathing device that prevents tissues in the throat from blocking your airway.  Or your doctor may recommend using a dental device (oral breathing device) to help keep your airway open. In some cases, surgery may be needed to remove enlarged tissues in the throat. Follow-up care is a key part of your treatment and safety. Be sure to make and go to all appointments, and call your doctor if you are having problems. It's also a good idea to know your test results and keep a list of the medicines you take. How can you care for yourself at home? · Lose weight, if needed. It may reduce the number of times you stop breathing or have slowed breathing. · Go to bed at the same time every night. · Sleep on your side. It may stop mild apnea. If you tend to roll onto your back, sew a pocket in the back of your pajama top. Put a tennis ball into the pocket, and stitch the pocket shut. This will help keep you from sleeping on your back. · Avoid alcohol and medicines such as sleeping pills and sedatives before bed. · Do not smoke. Smoking can make sleep apnea worse. If you need help quitting, talk to your doctor about stop-smoking programs and medicines. These can increase your chances of quitting for good. · Prop up the head of your bed 4 to 6 inches by putting bricks under the legs of the bed. · Treat breathing problems, such as a stuffy nose, caused by a cold or allergies. · Use a continuous positive airway pressure (CPAP) breathing machine if lifestyle changes do not help your apnea and your doctor recommends it. The machine keeps your airway from closing when you sleep. · If CPAP does not help you, ask your doctor whether you should try other breathing machines. A bilevel positive airway pressure machine has two types of air pressureâone for breathing in and one for breathing out. Another device raises or lowers air pressure as needed while you breathe. · If your nose feels dry or bleeds when using one of these machines, talk with your doctor about increasing moisture in the air. A humidifier may help.   · If your nose is runny or stuffy from using a breathing machine, talk with your doctor about using decongestants or a corticosteroid nasal spray. When should you call for help? Watch closely for changes in your health, and be sure to contact your doctor if:  · You still have sleep apnea even though you have made lifestyle changes. · You are thinking of trying a device such as CPAP. · You are having problems using a CPAP or similar machine. Where can you learn more? Go to AutomateItbe. Enter J775 in the search box to learn more about \"Sleep Apnea: After Your Visit. \"   © 3132-4342 Healthwise, Incorporated. Care instructions adapted under license by [facility] (which disclaims liability or warranty for this information). This care instruction is for use with your licensed healthcare professional. If you have questions about a medical condition or this instruction, always ask your healthcare professional. [employer] any warranty or liability for your use of this information. PROPER SLEEP HYGIENE    What to avoid  · Do not have drinks with caffeine, such as coffee or black tea, for 8 hours before bed. · Do not smoke or use other types of tobacco near bedtime. Nicotine is a stimulant and can keep you awake. · Avoid drinking alcohol late in the evening, because it can cause you to wake in the middle of the night. · Do not eat a big meal close to bedtime. If you are hungry, eat a light snack. · Do not drink a lot of water close to bedtime, because the need to urinate may wake you up during the night. · Do not read or watch TV in bed. Use the bed only for sleeping and sexual activity. What to try  · Go to bed at the same time every night, and wake up at the same time every morning. Do not take naps during the day. · Keep your bedroom quiet, dark, and cool. · Get regular exercise, but not within 3 to 4 hours of your bedtime. .  · Sleep on a comfortable pillow and mattress.   · If watching the clock makes you anxious, turn it facing away from you so you cannot see the time. · If you worry when you lie down, start a worry book. Well before bedtime, write down your worries, and then set the book and your concerns aside. · Try meditation or other relaxation techniques before you go to bed. · If you cannot fall asleep, get up and go to another room until you feel sleepy. Do something relaxing. Repeat your bedtime routine before you go to bed again. · Make your house quiet and calm about an hour before bedtime. Turn down the lights, turn off the TV, log off the computer, and turn down the volume on music. This can help you relax after a busy day. Drowsy Driving  The [address] [place] [address] Road Traffic Safety Administration cites drowsiness as a causing factor in more than 762,059 police reported crashes annually, resulting in 76,000 injuries and 1,500 deaths. Other surveys suggest 55% of people polled have driven while drowsy in the past year, 23% had fallen asleep but not crashed, 3% crashed, and 2% had and accident due to drowsy driving. Who is at risk? Young Drivers: One study of drowsy driving accidents states that 55% of the drivers were under 25 years. Of those, 75% were male. Shift Workers and Travelers: People who work overnight or travel across time zones frequently are at higher risk of experiencing Circadian Rhythm Disorders. They are trying to work and function when their body is programed to sleep. Sleep Deprived: Lack of sleep has a serious impact on your ability to pay attention or focus on a task. Consistently getting less than the average of 8 hours your body needs creates partial or cumulative sleep deprivation. Untreated Sleep Disorders: Sleep Apnea, Narcolepsy, R.L.S., and other sleep disorders (untreated) prevent a person from getting enough restful sleep. This leads to excessive daytime sleepiness and increases the risk for drowsy driving accidents by up to 7 times.   Medications / Alcohol: Even over the counter medications can cause drowsiness. Medications that impair a drivers attention should have a warning label. Alcohol naturally makes you sleepy and on its own can cause accidents. Combined with excessive drowsiness its effects are amplified. Signs of Drowsy Driving:   * You don't remember driving the last few miles   * You may drift out of your jimbo   * You are unable to focus and your thoughts wander   * You may yawn more often than normal   * You have difficulty keeping your eyes open / nodding off   * Missing traffic signs, speeding, or tailgating  Prevention-   Good sleep hygiene, lifestyle and behavioral choices have the most impact on drowsy driving. There is no substitute for sleep and the average person requires 8 hours nightly. If you find yourself driving drowsy, stop and sleep. Consider the sleep hygiene tips provided during your visit as well. Medication Refill Policy: Refills for all medications require 1 week advance notice. Please have your pharmacy fax a refill request. We are unable to fax, or call in \"controled substance\" medications and you will need to pick these prescriptions up from our office. "[employer] Activation    Thank you for requesting access to "[employer]. Please follow the instructions below to securely access and download your online medical record. "[employer] allows you to send messages to your doctor, view your test results, renew your prescriptions, schedule appointments, and more. How Do I Sign Up? 1. In your internet browser, go to [email or web address]. 2. Click on the First Time User? Click Here link in the Sign In box. You will see the New Member Sign Up page. 3. Enter your "[employer] Access Code exactly as it appears below. You will not need to use this code after youve completed the sign-up process. If you do not sign up before the expiration date, you must request a new code. "[employer] Access Code:  Activation code not generated  Current "[employer] Status: Active (This is the date your BillMyParents access code will )    4. Enter the last four digits of your Social Security Number (xxxx) and Date of Birth (mm/dd/yyyy) as indicated and click Submit. You will be taken to the next sign-up page. 5. Create a Dwellablet ID. This will be your BillMyParents login ID and cannot be changed, so think of one that is secure and easy to remember. 6. Create a BillMyParents password. You can change your password at any time. 7. Enter your Password Reset Question and Answer. This can be used at a later time if you forget your password. 8. Enter your e-mail address. You will receive e-mail notification when new information is available in [address] 9. Click Sign Up. You can now view and download portions of your medical record. 10. Click the Download Summary menu link to download a portable copy of your medical information. Additional Information    If you have questions, please call [phone]. Remember, BillMyParents is NOT to be used for urgent needs. For medical emergencies, dial 911.

## 2018-04-03 NOTE — PROGRESS NOTES
[address] [facility] [place]. [place], [address]  Tel.  [phone]  Fax. 100 [facility] 60  [place], [address] [facility]  Tel.  [phone]  Fax. [phone] [address]  Tel.  [phone]  Fax. [phone]       S>Rich Licona is a 36 y.o. male seen today to receive a home sleep testing unit (HST). · Patient was educated on proper hookup and operation of the HST. · Instruction forms and documentation were reviewed and signed. · The patient demonstrated good understanding of the HST. O>    Visit Vitals    /85    Pulse 81    Ht 6' 1\" (1.854 m)    Wt 340 lb (154.2 kg)    SpO2 96%    BMI 44.86 kg/m2    Neck circ. in \"inches\": 17.5    A>  1. JOY (obstructive sleep apnea)    2. BMI 40.0-44.9, adult ([address]    3. Essential hypertension          P>  · General information regarding operations and maintenance of the device was provided. · He was provided information on sleep apnea including coresponding risk factors and the importance of proper treatment. · Follow-up appointment was made to return the HST. He will be contacted once the results have been reviewed. · He was asked to contact our office for any problems regarding his home sleep test study.

## 2018-04-03 NOTE — PROGRESS NOTES
[address] [facility] [address] [place]. [place], [address]  Tel.  [phone]  Fax. 100 [facility] 60  [place], [address] [facility]  Tel.  [phone]  Fax. [phone] [address] [facility] [place] Mel[address]   Tel.  [phone]  Fax. [phone]         Subjective:      Kaylyn Hendrickson is an 36 y.o. male referred for evaluation for a sleep disorder. He complains of snoring associated with snorting, periods of not breathing, excessive daytime sleepiness, awakening in the middle of the night because of snoring. Symptoms began several years ago, unchanged since that time. He usually can fall asleep in <5 minutes. Family or house members note snoring, periods of not breathing. He denies completely or partially paralyzed while falling asleep or waking up. Kaylyn Hendrickson does wake up frequently at night. He is bothered by waking up too early and left unable to get back to sleep. He actually sleeps about 4 hours at night and wakes up about 7 times during the night. He does not work shifts:  . Melissa Osuna indicates he does get too little sleep at night. His bedtime is 2200. He awakens at 0600. He does take naps. He takes 3 naps a week lasting 30 to 45, Minute(s). He has the following observed behaviors: Loud snoring, Light snoring, Sleep talking, Pauses in breathing, Biting tongue;  . Other remarks: Waking with a gasp or snort     [place] Sleepiness Score: 16 which reflect severe daytime drowsiness. No Known Allergies      Current Outpatient Prescriptions:     metFORMIN (GLUCOPHAGE) 500 mg tablet, Take 1 Tab by mouth two (2) times daily (with meals) for 30 days. , Disp: 60 Tab, Rfl: 0    lisinopril (PRINIVIL, ZESTRIL) 5 mg tablet, Take 1 Tab by mouth daily for 30 days. , Disp: 30 Tab, Rfl: 0    VIT B COMP-C-FA-IRON-VIT E PO, Take  by mouth., Disp: , Rfl:      He  has a past medical history of Diabetes ([address] and Hypertension.     He  has a past surgical history that includes hx tonsillectomy (1990). He family history includes Diabetes in his maternal grandfather; Headache in his mother and sister; Hypertension in his mother. He  reports that he has never smoked. He has never used smokeless tobacco. He reports that he does not drink alcohol or use illicit drugs. Review of Systems:  Constitutional:  No significant weight loss or weight gain  Eyes:  No blurred vision  CVS:  No significant chest pain  Pulm:  No significant shortness of breath  GI:  No significant nausea or vomiting  :  No significant nocturia  Musculoskeletal:  significant joint pain at night  Skin:  No significant rashes  Neuro:  No significant dizziness   Psych:  No active mood issues    Sleep Review of Systems: notable for no difficulty falling asleep; frequent awakenings at night;  regular dreaming noted; no nightmares ; no early morning headaches; memory problems; concentration issues; no history of any automobile or occupational accidents due to daytime drowsiness. Objective:     Visit Vitals    /85    Pulse 81    Ht 6' 1\" (1.854 m)    Wt 340 lb (154.2 kg)    SpO2 96%    BMI 44.86 kg/m2         General:   Not in acute distress   Eyes:  Anicteric sclerae, no obvious strabismus   Nose:  No obvious nasal septum deviation    Oropharynx:   Class 4 oropharyngeal outlet, thick tongue base, uvula could not be seen due to low-lying soft palate, narrow tonsilo-pharyngeal pilars   Tonsils:   tonsils are not seen due to low-lying soft palate   Neck:   Neck circ. in \"inches\": 17.5; midline trachea   Chest/Lungs:  Equal lung expansion, clear on auscultation    CVS:  Normal rate, regular rhythm; no JVD   Skin:  Warm to touch; no obvious rashes   Neuro:  No focal deficits ; no obvious tremor    Psych:  Normal affect,  normal countenance;          Assessment:       ICD-10-CM ICD-9-CM    1. JOY (obstructive sleep apnea) G47.33 327.23 SLEEP STUDY UNATTENDED, 4 CHANNEL   2.  BMI 40.0-44.9, adult ([facility] Z68.41 V85.41    3. Essential hypertension I10 401.9          Plan:     * The patient currently has a High Risk for having sleep apnea. STOP-BANG score 7.  * Sleep testing was ordered for initial evaluation. * He was provided information on sleep apnea including coresponding risk factors and the importance of proper treatment. * Treatment options if indicated were reviewed today. Patient agrees to a trial of PAP therapy if indicated. * Counseling was provided regarding proper sleep hygiene (including effect of light on sleep), paradoxical intention, stimulus control, sleep environment safety and safe driving. * Effect of sleep disturbance on weight was reviewed. We have recommended a dedicated weight loss through appropriate diet and an exercise regiment as significant weight reduction has been shown to reduce severity of obstructive sleep apnea. * Patient agrees to telephone [phone]  follow-up by myself or lead sleep technologist shortly after sleep study to review results and plan final management.     (patient has given permission for a message to be left regarding test results and further management if patient cannot be cannot be reached directly). Thank you for allowing us to participate in your patient's medical care. We'll keep you updated on these investigations. Luma Iyer MD, FAASM  Electronically signed.  04/03/18

## 2018-04-04 ENCOUNTER — DOCUMENTATION ONLY (OUTPATIENT)
Dept: SLEEP MEDICINE | Age: 43
End: 2018-04-04

## 2018-04-05 ENCOUNTER — OFFICE VISIT (OUTPATIENT)
Dept: INTERNAL MEDICINE CLINIC | Age: 43
End: 2018-04-05

## 2018-04-05 VITALS
HEIGHT: 73 IN | RESPIRATION RATE: 20 BRPM | DIASTOLIC BLOOD PRESSURE: 82 MMHG | OXYGEN SATURATION: 96 % | SYSTOLIC BLOOD PRESSURE: 130 MMHG | TEMPERATURE: 98.3 F | HEART RATE: 81 BPM | WEIGHT: 315 LBS | BODY MASS INDEX: 41.75 KG/M2

## 2018-04-05 DIAGNOSIS — E11.9 TYPE 2 DIABETES MELLITUS WITHOUT COMPLICATION, WITHOUT LONG-TERM CURRENT USE OF INSULIN (HCC): ICD-10-CM

## 2018-04-05 DIAGNOSIS — N20.0 RENAL CALCULUS, LEFT: ICD-10-CM

## 2018-04-05 DIAGNOSIS — E66.01 MORBID OBESITY (HCC): ICD-10-CM

## 2018-04-05 DIAGNOSIS — L03.119 CELLULITIS OF WRIST: Primary | ICD-10-CM

## 2018-04-05 RX ORDER — SULFAMETHOXAZOLE AND TRIMETHOPRIM 800; 160 MG/1; MG/1
1 TABLET ORAL 2 TIMES DAILY
Qty: 14 TAB | Refills: 0 | Status: SHIPPED | OUTPATIENT
Start: 2018-04-05 | End: 2018-04-12

## 2018-04-05 RX ORDER — TAMSULOSIN HYDROCHLORIDE 0.4 MG/1
0.4 CAPSULE ORAL DAILY
Qty: 10 CAP | Refills: 0 | Status: SHIPPED | OUTPATIENT
Start: 2018-04-05 | End: 2018-04-15

## 2018-04-05 NOTE — MR AVS SNAPSHOT
[address] [facility] [address] 
[phone] Patient: Zoë Maharaj MRN: [id number] :1975 Visit Information Date & Time Provider Department Dept. Phone Encounter #  
 2018  8:45 AM Varsha Lin MD [facility] Internal Medicine [phone] [id number] Upcoming Health Maintenance Date Due  
 EYE EXAM RETINAL OR DILATED Q1 1984 DTaP/Tdap/Td series (1 - Tdap) 1995 FOOT EXAM Q1 2016 MICROALBUMIN Q1 2017 HEMOGLOBIN A1C Q6M 2018 LIPID PANEL Q1 2019 Allergies as of 2018  Review Complete On: 2018 By: Varsha Lin MD  
 No Known Allergies Current Immunizations  Never Reviewed Name Date Influenza Vaccine Intradermal PF 2015 Not reviewed this visit You Were Diagnosed With   
  
 Codes Comments Cellulitis of wrist    -  Primary ICD-10-CM: W39.811 ICD-9-CM: 682.4 Morbid obesity ([facility]     ICD-10-CM: E66.01 
ICD-9-CM: 278.01 Type 2 diabetes mellitus without complication, without long-term current use of insulin (HCC)     ICD-10-CM: E11.9 ICD-9-CM: 250.00 Renal calculus, left     ICD-10-CM: N20.0 ICD-9-CM: 592.0 Vitals BP Pulse Temp Resp Height(growth percentile) Weight(growth percentile) 130/82 (BP 1 Location: Left arm, BP Patient Position: Sitting) 81 98.3 °F (36.8 °C) (Oral) 20 6' 1\" (1.854 m) 334 lb 9.6 oz (151.8 kg) SpO2 BMI Smoking Status 96% 44.15 kg/m2 Never Smoker Vitals History BMI and BSA Data Body Mass Index Body Surface Area  
 44.15 kg/m 2 2.8 m 2 Preferred Pharmacy Pharmacy Name Phone [id number] [place], [address] [address] [phone] Your Updated Medication List  
  
   
This list is accurate as of 18  9:12 AM.  Always use your most recent med list.  
  
  
  
  
 lisinopril 5 mg tablet Commonly known as:  Deshawn Peek Take 1 Tab by mouth daily for 30 days. metFORMIN 500 mg tablet Commonly known as:  GLUCOPHAGE Take 1 Tab by mouth two (2) times daily (with meals) for 30 days. tamsulosin 0.4 mg capsule Commonly known as:  FLOMAX Take 1 Cap by mouth daily for 10 doses. trimethoprim-sulfamethoxazole 160-800 mg per tablet Commonly known as:  BACTRIM DS, SEPTRA DS Take 1 Tab by mouth two (2) times a day for 7 days. VIT B COMP-C-FA-IRON-VIT E PO Take  by mouth. Prescriptions Printed Refills  
 tamsulosin (FLOMAX) 0.4 mg capsule 0 Sig: Take 1 Cap by mouth daily for 10 doses. Class: Print Route: Oral  
  
Prescriptions Sent to Pharmacy Refills  
 trimethoprim-sulfamethoxazole (BACTRIM DS, SEPTRA DS) 160-800 mg per tablet 0 Sig: Take 1 Tab by mouth two (2) times a day for 7 days. Class: Normal  
 Pharmacy: [facility]/pharmacy #[id number] #: [phone] Route: Oral  
  
Introducing [facility]! Dear Dania Bang: 
Thank you for requesting a [employer] account. Our records indicate that you already have an active [employer] account. You can access your account anytime at [email or web address] Did you know that you can access your hospital and ER discharge instructions at any time in [employer] You can also review all of your test results from your hospital stay or ER visit. Additional Information If you have questions, please visit the Frequently Asked Questions section of the [employer] website at [email or web address] Remember, [employer] is NOT to be used for urgent needs. For medical emergencies, dial 911. Now available from your iPhone and Android! Please provide this summary of care documentation to your next provider. Your primary care clinician is listed as Winter Asencio.  If you have any questions after today's visit, please call [phone].

## 2018-04-05 NOTE — PROGRESS NOTES
Chief Complaint   Patient presents with    Cyst     Patient stated his wife noticed a red raised area on left lower arm on 4/2/18. stated when he touches the area it feels like a  lump and hot and when he squeezed area a clear fluid came out and stated it is painful to touch. 1. Have you been to the ER, urgent care clinic since your last visit? Hospitalized since your last visit? NO    2. Have you seen or consulted any other health care providers outside of the [address] [place] [address] since your last visit? Include any pap smears or colon screening.  NO

## 2018-04-05 NOTE — PROGRESS NOTES
Written by Michelle Garrison, as dictated by Dr. Sushant Kamara MD.    Viktoriya Lomeli is a 36 y.o. male. HPI  The patient comes in today c/o lesions on his L forearm. He squeezed the larger one yesterday and noticed some clear discharge. He does not know where it could have come from. He has lost weight, from 340 lbs on 04/03 to 334 lbs today, and he was 355 lbs in 02/2018. His initial weight loss was because he did not feel like eating while he was experiencing abdominal pain, but lately he has been eating a lot of vegetables and has been avoiding carbohydrates. He finds that sugar upsets his stomach. He has not been exercising: he has exercised in the past, but he experienced hip and back pain afterwards so he stopped. He had a home sleep study recently, but has not gotten results back yet. He sleeps on his side, and wakes up in the middle of the night with hip pain similar to the pain he feels after he exercises. He is compliant on metformin and denies GI issues. He has not been checking his BS at home. He has been experiencing L flank pain. Abdominal/pelvic CT in 03/2018 did show \"6 mm nonobstructing calculus of lower pole of left kidney. \"No blood in urine or pain with urination. Patient Active Problem List   Diagnosis Code    Essential hypertension I10    Type 2 diabetes mellitus without complication ([address] J39.6    Morbid obesity ([facility] E66.01        Current Outpatient Prescriptions on File Prior to Visit   Medication Sig Dispense Refill    metFORMIN (GLUCOPHAGE) 500 mg tablet Take 1 Tab by mouth two (2) times daily (with meals) for 30 days. 60 Tab 0    lisinopril (PRINIVIL, ZESTRIL) 5 mg tablet Take 1 Tab by mouth daily for 30 days. 30 Tab 0    VIT B COMP-C-FA-IRON-VIT E PO Take  by mouth. No current facility-administered medications on file prior to visit.         Past Medical History:   Diagnosis Date    Diabetes ([facility]     Hypertension Past Surgical History:   Procedure Laterality Date    HX TONSILLECTOMY  200       Family History   Problem Relation Age of Onset    Hypertension Mother     Headache Mother     Headache Sister     Diabetes Maternal Grandfather        Social History     Social History    Marital status:      Spouse name: N/A    Number of children: N/A    Years of education: N/A     Occupational History    Not on file. Social History Main Topics    Smoking status: Never Smoker    Smokeless tobacco: Never Used    Alcohol use No    Drug use: No    Sexual activity: Yes     Partners: Female     Other Topics Concern    Not on file     Social History Narrative       Office Visit on 02/21/2018   Component Date Value Ref Range Status    Lipase 02/21/2018 36  13 - 78 U/L Final    Glucose 02/21/2018 110* 65 - 99 mg/dL Final    BUN 02/21/2018 11  6 - 24 mg/dL Final    Creatinine 02/21/2018 0.87  0.76 - 1.27 mg/dL Final    GFR est non-AA 02/21/2018 106  >59 Final    GFR est AA 02/21/2018 122  >59 Final    BUN/Creatinine ratio 02/21/2018 13  9 - 20 Final    Sodium 02/21/2018 142  134 - 144 mmol/L Final    Potassium 02/21/2018 4.4  3.5 - 5.2 mmol/L Final    Chloride 02/21/2018 104  96 - 106 mmol/L Final    CO2 02/21/2018 21  18 - 29 mmol/L Final    Calcium 02/21/2018 9.5  8.7 - 10.2 mg/dL Final    Protein, total 02/21/2018 7.3  6.0 - 8.5 g/dL Final    Albumin 02/21/2018 4.5  3.5 - 5.5 g/dL Final    GLOBULIN, TOTAL 02/21/2018 2.8  1.5 - 4.5 Final    A-G Ratio 02/21/2018 1.6  1.2 - 2.2 Final    Bilirubin, total 02/21/2018 0.3  0.0 - 1.2 mg/dL Final    Alk.  phosphatase 02/21/2018 62  39 - 117 IU/L Final    AST (SGOT) 02/21/2018 27  0 - 40 IU/L Final    ALT (SGPT) 02/21/2018 39  0 - 44 IU/L Final    WBC 02/21/2018 8.9  3.4 - 10.8 x10E3/uL Final    RBC 02/21/2018 5.43  4.14 - 5.80 x10E6/uL Final    HGB 02/21/2018 13.9  13.0 - 17.7 g/dL Final    HCT 02/21/2018 44.1  37.5 - 51.0 % Final    MCV 02/21/2018 81  79 - 97 fL Final    MCH 02/21/2018 25.6* 26.6 - 33.0 pg Final    MCHC 02/21/2018 31.5  31.5 - 35.7 g/dL Final    RDW 02/21/2018 15.3  12.3 - 15.4 % Final    PLATELET 39/72/0447 483  150 - 379 x10E3/uL Final    Cholesterol, total 02/21/2018 168  100 - 199 mg/dL Final    Triglyceride 02/21/2018 85  0 - 149 mg/dL Final    HDL Cholesterol 02/21/2018 44  >39 mg/dL Final    VLDL, calculated 02/21/2018 17  5 - 40 Final    LDL, calculated 02/21/2018 107* 0 - 99 Final    Hemoglobin A1c 02/21/2018 7.1* 4.8 - 5.6 % Final    Estimated average glucose 02/21/2018 157   Final    TSH 02/21/2018 0.961  0.450 - 4.500 uIU/mL Final       Review of Systems   Constitutional: Negative for malaise/fatigue. HENT: Negative for congestion. Respiratory: Negative for cough and shortness of breath. Genitourinary: Positive for flank pain. Negative for frequency and urgency. Musculoskeletal: Positive for joint pain. Negative for myalgias. Skin: Positive for rash. Neurological: Negative for weakness. Psychiatric/Behavioral: Negative for depression, memory loss and substance abuse. Visit Vitals    /82 (BP 1 Location: Left arm, BP Patient Position: Sitting)    Pulse 81    Temp 98.3 °F (36.8 °C) (Oral)    Resp 20    Ht 6' 1\" (1.854 m)    Wt 334 lb 9.6 oz (151.8 kg)    SpO2 96%    BMI 44.15 kg/m2       Physical Exam   Constitutional: He is oriented to person, place, and time. He appears well-developed. No distress. Morbidly obese   HENT:   Right Ear: External ear normal.   Left Ear: External ear normal.   Eyes: Conjunctivae and EOM are normal. Right eye exhibits no discharge. Left eye exhibits no discharge. Neck: Normal range of motion. Neck supple. Cardiovascular: Normal rate and regular rhythm. Pulmonary/Chest: Effort normal and breath sounds normal. He has no wheezes. Abdominal: Soft. Bowel sounds are normal. There is no tenderness.    Lymphadenopathy:     He has no cervical adenopathy. Neurological: He is alert and oriented to person, place, and time. Skin: Skin is warm. He is not diaphoretic. There is erythema. Open area on L forearm, warm and erythematous, no oozing   Psychiatric: He has a normal mood and affect. His behavior is normal.   Nursing note and vitals reviewed. ASSESSMENT and PLAN    ICD-10-CM ICD-9-CM    1. Cellulitis of wrist L03.119 682.4 trimethoprim-sulfamethoxazole (BACTRIM DS, SEPTRA DS) 160-800 mg per tablet sent to pharmacy    Bactrim given for 7 days. 2. Morbid obesity ([facility] E66.01 278.01 I want him to start walking consistently for 20-30 minutes daily, in addition to continuing his diet changes. Recommended Tylenol arthritis if he experiences pain after exercise. 3. Type 2 diabetes mellitus without complication, without long-term current use of insulin (HCC) E11.9 250.00 Compliant on metformin. following low carb diet as well. 4. Renal calculus, left N20.0 592.0 tamsulosin (FLOMAX) 0.4 mg capsule script given to patient    Flomax given. He should keep drinking plenty of water. This plan was reviewed with the patient and patient agrees. All questions were answered. This scribe documentation was reviewed by me and accurately reflects the examination and decisions made by me. This note will not be viewable in [address]

## 2018-04-09 ENCOUNTER — TELEPHONE (OUTPATIENT)
Dept: SLEEP MEDICINE | Age: 43
End: 2018-04-09

## 2018-04-09 DIAGNOSIS — G47.33 OSA (OBSTRUCTIVE SLEEP APNEA): Primary | ICD-10-CM

## 2018-04-09 NOTE — TELEPHONE ENCOUNTER
HSAT Returned-[facility]    Date of Study: 4/3/18    The following information was gathered from the patients study log:    · Lights off: 9:30PM  · Estimated sleep onset:10PM    · Awakened a total of 5 times  · The patient felt they slept 4 hours  · Patient took none before starting the test  · Sleep quality was same compared to a usual nights sleep. Further information provided: Tossed and turned all night. Had difficult time going back to sleep after waking up each time but stayed in bed.

## 2018-04-10 ENCOUNTER — DOCUMENTATION ONLY (OUTPATIENT)
Dept: SLEEP MEDICINE | Age: 43
End: 2018-04-10

## 2018-04-10 NOTE — PROGRESS NOTES
PAP device order sent to [address] [place] [address]. Patient has been notified and 1st adherence scheduled.

## 2018-04-10 NOTE — TELEPHONE ENCOUNTER
Ellie Mack is to be contacted by lead sleep technologist regarding results of Sleep Testing which was indicative of an average AHI of 22 per hour with an SpO2 nicolette of 81% and SpO2 of < 88% being 6.1 minutes. An APAP prescription has been written and patient will be contacted by office staff regarding follow-up  in 2-3 months after initiation of therapy. Encounter Diagnosis   Name Primary?  JOY (obstructive sleep apnea) Yes       Orders Placed This Encounter    AMB SUPPLY ORDER     Diagnosis: Obstructive Sleep Apnea ICD-10 Code (G47.33)    Positive Airway Pressure Therapy: Duration of need: 99 months. ResMed APAP Device: Minimum Pressure: 4 cmH2O, Maximum Pressure: 20 cmH2O. Ramp Time: 30 Minutes. EPR: 2. CPAP mask -  Patient preference, headgear, tubing, and filter;  heated humidifier; wireless modem. Remote monitoring enrollment. Gabe Garber MD, FAASM; NPI: [id number]  Electronically signed. 04/10/18

## 2018-04-15 DIAGNOSIS — E11.9 TYPE 2 DIABETES MELLITUS WITHOUT COMPLICATION, WITHOUT LONG-TERM CURRENT USE OF INSULIN (HCC): ICD-10-CM

## 2018-04-15 RX ORDER — METFORMIN HYDROCHLORIDE 500 MG/1
TABLET ORAL
Qty: 60 TAB | Refills: 0 | Status: SHIPPED | OUTPATIENT
Start: 2018-04-15 | End: 2018-05-21 | Stop reason: SDUPTHER

## 2018-04-18 DIAGNOSIS — I10 HYPERTENSION, UNSPECIFIED TYPE: Primary | ICD-10-CM

## 2018-04-18 RX ORDER — LISINOPRIL 5 MG/1
5 TABLET ORAL DAILY
Qty: 90 TAB | Refills: 1 | Status: SHIPPED | OUTPATIENT
Start: 2018-04-18 | End: 2018-11-15 | Stop reason: SDUPTHER

## 2018-05-21 DIAGNOSIS — E11.9 TYPE 2 DIABETES MELLITUS WITHOUT COMPLICATION, WITHOUT LONG-TERM CURRENT USE OF INSULIN (HCC): ICD-10-CM

## 2018-05-21 RX ORDER — METFORMIN HYDROCHLORIDE 500 MG/1
TABLET ORAL
Qty: 60 TAB | Refills: 0 | Status: SHIPPED | OUTPATIENT
Start: 2018-05-21 | End: 2018-08-05 | Stop reason: SDUPTHER

## 2018-06-19 ENCOUNTER — OFFICE VISIT (OUTPATIENT)
Dept: INTERNAL MEDICINE CLINIC | Age: 43
End: 2018-06-19

## 2018-06-19 VITALS
SYSTOLIC BLOOD PRESSURE: 136 MMHG | TEMPERATURE: 98.7 F | RESPIRATION RATE: 16 BRPM | HEIGHT: 73 IN | WEIGHT: 315 LBS | OXYGEN SATURATION: 95 % | DIASTOLIC BLOOD PRESSURE: 80 MMHG | BODY MASS INDEX: 41.75 KG/M2 | HEART RATE: 76 BPM

## 2018-06-19 DIAGNOSIS — F41.1 GENERALIZED ANXIETY DISORDER: ICD-10-CM

## 2018-06-19 DIAGNOSIS — E66.01 MORBID OBESITY (HCC): ICD-10-CM

## 2018-06-19 DIAGNOSIS — I10 ESSENTIAL HYPERTENSION: ICD-10-CM

## 2018-06-19 DIAGNOSIS — E11.9 TYPE 2 DIABETES MELLITUS WITHOUT COMPLICATION, WITHOUT LONG-TERM CURRENT USE OF INSULIN (HCC): Primary | ICD-10-CM

## 2018-06-19 RX ORDER — BUSPIRONE HYDROCHLORIDE 10 MG/1
10 TABLET ORAL 3 TIMES DAILY
Qty: 90 TAB | Refills: 0 | Status: SHIPPED | OUTPATIENT
Start: 2018-06-19 | End: 2018-06-19 | Stop reason: SDUPTHER

## 2018-06-19 RX ORDER — BUSPIRONE HYDROCHLORIDE 10 MG/1
10 TABLET ORAL 3 TIMES DAILY
Qty: 90 TAB | Refills: 0 | Status: SHIPPED | OUTPATIENT
Start: 2018-06-19 | End: 2018-07-16 | Stop reason: SDUPTHER

## 2018-06-19 NOTE — PROGRESS NOTES
Written by Uche Tillman, as dictated by Dr. Dayna Blue MD.    Lalito Carbone is a 36 y.o. male. HPI  The patient presents today for a follow-up. His BP is high today at 150/84, 136/80 on repeat. He states that this is because he is anxious and stressed all the time. He is taking lisinopril. He states that his anxiety is irrational: he sees problems and does not see solutions. He has not eaten today, even though he is hungry. He states that when he tries to eat, his anxiety makes him feel nauseous. He also reports that he has had \"borderline panic attacks\" where he has to stop and breath for a while. According to his wife, his anxiety has been worsening. The patient states that he read about generalized anxiety and that it describes his sxs. While taking Wellbutrin, he was feeling depressed and suicidal. He weighs 326 lbs today, down from 355 lbs in 02/2018. He is not walking daily. He is still taking metformin 500 mg. Patient Active Problem List   Diagnosis Code    Essential hypertension I10    Type 2 diabetes mellitus without complication ([facility] L21.1    Morbid obesity ([facility] E66.01    Renal calculus, left N20.0        Current Outpatient Prescriptions on File Prior to Visit   Medication Sig Dispense Refill    metFORMIN (GLUCOPHAGE) 500 mg tablet TAKE 1 TABLET BY MOUTH TWICE A DAY 60 Tab 0    lisinopril (PRINIVIL, ZESTRIL) 5 mg tablet Take 1 Tab by mouth daily. 90 Tab 1    VIT B COMP-C-FA-IRON-VIT E PO Take  by mouth. No current facility-administered medications on file prior to visit.         Allergies   Allergen Reactions    Wellbutrin [Bupropion Hcl] Unknown (comments)       Past Medical History:   Diagnosis Date    Diabetes ([address]     Hypertension        Past Surgical History:   Procedure Laterality Date    HX TONSILLECTOMY  200       Family History   Problem Relation Age of Onset    Hypertension Mother     Headache Mother    Desi Bray Headache Sister     Diabetes Maternal Grandfather        Social History     Social History    Marital status:      Spouse name: N/A    Number of children: N/A    Years of education: N/A     Occupational History    Not on file. Social History Main Topics    Smoking status: Never Smoker    Smokeless tobacco: Never Used    Alcohol use No    Drug use: No    Sexual activity: Yes     Partners: Female     Other Topics Concern    Not on file     Social History Narrative       Review of Systems   Constitutional: Negative for malaise/fatigue. HENT: Negative for congestion. Respiratory: Negative for cough and shortness of breath. Cardiovascular: Negative for chest pain and palpitations. Gastrointestinal: Negative for abdominal pain and heartburn. Musculoskeletal: Negative for joint pain and myalgias. Neurological: Negative for focal weakness, weakness and headaches. Psychiatric/Behavioral: Negative for depression, memory loss and substance abuse. The patient is nervous/anxious. Visit Vitals    /84 (BP 1 Location: Right arm, BP Patient Position: Sitting)    Pulse 76    Temp 98.7 °F (37.1 °C) (Oral)    Resp 16    Ht 6' 1\" (1.854 m)    Wt 326 lb 12.8 oz (148.2 kg)    SpO2 95%    BMI 43.12 kg/m2       Physical Exam   Constitutional: He is oriented to person, place, and time. He appears well-developed. No distress. Morbidly obese   HENT:   Right Ear: External ear normal.   Left Ear: External ear normal.   Eyes: Conjunctivae and EOM are normal.   Neck: Normal range of motion. Neck supple. Cardiovascular: Normal rate and regular rhythm. Pulmonary/Chest: Effort normal and breath sounds normal. He has no wheezes. Abdominal: Soft. Bowel sounds are normal. There is no tenderness. Lymphadenopathy:     He has no cervical adenopathy. Neurological: He is alert and oriented to person, place, and time. Skin: He is not diaphoretic.    Psychiatric: He has a normal mood and affect. His behavior is normal.   Nursing note and vitals reviewed. ASSESSMENT and PLAN    ICD-10-CM ICD-9-CM    1. Type 2 diabetes mellitus without complication, without long-term current use of insulin (HCC) E11.9 250.00 HEMOGLOBIN A1C WITH EAG      LIPID PANEL    Basic fasting labs drawn to check HA1c and cholesterol. 2. Generalized anxiety disorder F41.1 300.02 busPIRone (BUSPAR) 10 mg tablet sent to pharmacy. Buspar 10 mg prescribed. Take Buspar once per day for 3 days. If he does not experience any side effects, he should take it BID. We will see how he does taking it BID, and if needed he can take it TID. 3. Essential hypertension V71 596.5 METABOLIC PANEL, COMPREHENSIVE   4. Morbid obesity ([facility] [facility] E66.01 278.01 He should maintain a healthy diet and walk for 20 minutes daily. Commended him on his weight loss. Discussed that phentermine may be an option for the future if he hits plateau. This plan was reviewed with the patient and patient agrees. All questions were answered. This scribe documentation was reviewed by me and accurately reflects the examination and decisions made by me. This note will not be viewable in [address]

## 2018-06-19 NOTE — MR AVS SNAPSHOT
[address] [facility] [address] [place] [address] [facility] 
[phone] Patient: Zoë Maharaj MRN: [id number] :1975 Visit Information Date & Time Provider Department Dept. Phone Encounter #  
 2018 12:30 PM Meghana Hernandez MD [facility] Internal Medicine [phone] [id number] Your Appointments 2018 11:40 AM  
Any with Millie Bean MD  
[address] [place] ([facility] Appt Note: 1st adherence [address] [place] [address] [address]  
  
   
 [address] [facility] [address] [place] [phone] Upcoming Health Maintenance Date Due  
 EYE EXAM RETINAL OR DILATED Q1 1984 DTaP/Tdap/Td series (1 - Tdap) 1995 FOOT EXAM Q1 2016 MICROALBUMIN Q1 2017 Influenza Age 5 to Adult 2018 HEMOGLOBIN A1C Q6M 2018 LIPID PANEL Q1 2019 Allergies as of 2018  Review Complete On: 2018 By: Meghana Hernandez MD  
  
 Severity Noted Reaction Type Reactions Wellbutrin [Bupropion Hcl]  2018    Unknown (comments) Current Immunizations  Never Reviewed Name Date Influenza Vaccine Intradermal PF 2015 Not reviewed this visit You Were Diagnosed With   
  
 Codes Comments Type 2 diabetes mellitus without complication, without long-term current use of insulin (HCC)    -  Primary ICD-10-CM: E11.9 ICD-9-CM: 250.00 Generalized anxiety disorder     ICD-10-CM: F41.1 ICD-9-CM: 300.02 Essential hypertension     ICD-10-CM: I10 
ICD-9-CM: 401.9 Morbid obesity ([facility] [address] [facility]     ICD-10-CM: E66.01 
ICD-9-CM: 278.01 Vitals BP Pulse Temp Resp Height(growth percentile) Weight(growth percentile) 136/80 (BP 1 Location: Right arm, BP Patient Position: Sitting) 76 98.7 °F (37.1 °C) (Oral) 16 6' 1\" (1.854 m) 326 lb 12.8 oz (148.2 kg) SpO2 BMI Smoking Status 95% 43.12 kg/m2 Never Smoker Vitals History BMI and BSA Data Body Mass Index Body Surface Area  
 43.12 kg/m 2 2.76 m 2 Preferred Pharmacy Pharmacy Name Phone [id number] [place] [address] [phone] Your Updated Medication List  
  
   
This list is accurate as of 6/19/18  1:54 PM.  Always use your most recent med list.  
  
  
  
  
 busPIRone 10 mg tablet Commonly known as:  BUSPAR Take 1 Tab by mouth three (3) times daily for 30 days. lisinopril 5 mg tablet Commonly known as:  Rosanna Bills Take 1 Tab by mouth daily. metFORMIN 500 mg tablet Commonly known as:  GLUCOPHAGE  
TAKE 1 TABLET BY MOUTH TWICE A DAY  
  
 VIT B COMP-C-FA-IRON-VIT E PO Take  by mouth. Prescriptions Sent to Pharmacy Refills  
 busPIRone (BUSPAR) 10 mg tablet 0 Sig: Take 1 Tab by mouth three (3) times daily for 30 days. Class: Normal  
 Pharmacy: [facility][id number] [place] [place] [address] Ph #: [phone] Route: Oral  
  
We Performed the Following HEMOGLOBIN A1C WITH EAG [22698 CPT(R)] LIPID PANEL [47504 CPT(R)] METABOLIC PANEL, COMPREHENSIVE [04846 CPT(R)] Introducing [facility]! Dear Bozena Erwin: 
Thank you for requesting a [employer] account. Our records indicate that you already have an active [employer] account. You can access your account anytime at [email or web address] Did you know that you can access your hospital and ER discharge instructions at any time in [employer]? You can also review all of your test results from your hospital stay or ER visit. Additional Information If you have questions, please visit the Frequently Asked Questions section of the [employer] website at [email or web address] Remember, [employer] is NOT to be used for urgent needs. For medical emergencies, dial 911. Now available from your iPhone and Android! Please provide this summary of care documentation to your next provider. Your primary care clinician is listed as Glenn Szymanski. If you have any questions after today's visit, please call [phone].

## 2018-06-19 NOTE — PROGRESS NOTES
Chief Complaint   Patient presents with    Follow-up     blood pressure medication and metformin. states still dealing anxiety.

## 2018-06-20 LAB
ALBUMIN SERPL-MCNC: 4.6 G/DL (ref 3.5–5.5)
ALBUMIN/GLOB SERPL: 1.6 {RATIO} (ref 1.2–2.2)
ALP SERPL-CCNC: 56 IU/L (ref 39–117)
ALT SERPL-CCNC: 23 IU/L (ref 0–44)
AST SERPL-CCNC: 16 IU/L (ref 0–40)
BILIRUB SERPL-MCNC: 0.3 MG/DL (ref 0–1.2)
BUN SERPL-MCNC: 10 MG/DL (ref 6–24)
BUN/CREAT SERPL: 11 (ref 9–20)
CALCIUM SERPL-MCNC: 10 MG/DL (ref 8.7–10.2)
CHLORIDE SERPL-SCNC: 104 MMOL/L (ref 96–106)
CHOLEST SERPL-MCNC: 177 MG/DL (ref 100–199)
CO2 SERPL-SCNC: 20 MMOL/L (ref 20–29)
CREAT SERPL-MCNC: 0.93 MG/DL (ref 0.76–1.27)
EST. AVERAGE GLUCOSE BLD GHB EST-MCNC: 134 MG/DL
GFR SERPLBLD CREATININE-BSD FMLA CKD-EPI: 100 ML/MIN/1.73
GFR SERPLBLD CREATININE-BSD FMLA CKD-EPI: 116 ML/MIN/1.73
GLOBULIN SER CALC-MCNC: 2.9 G/DL (ref 1.5–4.5)
GLUCOSE SERPL-MCNC: 99 MG/DL (ref 65–99)
HBA1C MFR BLD: 6.3 % (ref 4.8–5.6)
HDLC SERPL-MCNC: 48 MG/DL
INTERPRETATION, 910389: NORMAL
LDLC SERPL CALC-MCNC: 117 MG/DL (ref 0–99)
Lab: NORMAL
POTASSIUM SERPL-SCNC: 4.7 MMOL/L (ref 3.5–5.2)
PROT SERPL-MCNC: 7.5 G/DL (ref 6–8.5)
SODIUM SERPL-SCNC: 139 MMOL/L (ref 134–144)
TRIGL SERPL-MCNC: 60 MG/DL (ref 0–149)
VLDLC SERPL CALC-MCNC: 12 MG/DL (ref 5–40)

## 2018-06-25 ENCOUNTER — OFFICE VISIT (OUTPATIENT)
Dept: INTERNAL MEDICINE CLINIC | Age: 43
End: 2018-06-25

## 2018-06-25 VITALS
RESPIRATION RATE: 16 BRPM | TEMPERATURE: 98.5 F | BODY MASS INDEX: 41.75 KG/M2 | SYSTOLIC BLOOD PRESSURE: 136 MMHG | HEART RATE: 94 BPM | WEIGHT: 315 LBS | DIASTOLIC BLOOD PRESSURE: 88 MMHG | OXYGEN SATURATION: 97 % | HEIGHT: 73 IN

## 2018-06-25 DIAGNOSIS — F41.9 ANXIETY: ICD-10-CM

## 2018-06-25 DIAGNOSIS — R53.82 CHRONIC FATIGUE: ICD-10-CM

## 2018-06-25 DIAGNOSIS — I10 ESSENTIAL HYPERTENSION: Primary | ICD-10-CM

## 2018-06-25 DIAGNOSIS — E11.9 WELL CONTROLLED DIABETES MELLITUS (HCC): ICD-10-CM

## 2018-06-25 RX ORDER — ALPRAZOLAM 0.5 MG/1
0.5 TABLET ORAL
Qty: 5 TAB | Refills: 0 | Status: SHIPPED | OUTPATIENT
Start: 2018-06-25 | End: 2018-08-01 | Stop reason: SDUPTHER

## 2018-06-25 NOTE — PROGRESS NOTES
Richy Stapleton is a 36 y.o. male      Chief Complaint   Patient presents with    Fever     Over the weekend.  Headache     Pt stated that he took advil for the symptoms.  Dizziness       1. Have you been to the ER, urgent care clinic since your last visit? Hospitalized since your last visit? No    2. Have you seen or consulted any other health care providers outside of the [facility] since your last visit? Include any pap smears or colon screening.  No

## 2018-06-25 NOTE — PROGRESS NOTES
Written by Shayna Linda, as dictated by Dr. Cheyanne Paul MD.    Najma Grubbs is a 36 y.o. male. HPI  The patient presents today c/o fever, headache, and dizziness which started on 06/21. His fever spiked at night and was highest at 102. He has been experiencing pain behind his eyes. He  had also been experiencing a cough. However ,he is not experiencing these sxs today    His HA1c was down to 6.3% on 06/19. His other labs were normal. He is compliant on metformin. He weighs 331 lbs today, up from 326 lbs on 06/19. He has been trying to maintain a healthy diet and has not been experiencing cravings. His BP is high today at 140/96, 136/88 on repeat. He has not been checking his BP at home. He reports that he has not been having a lot of difficulty sleeping. He is no longer taking Wellbutrin. He states that Buspar is working a little, but the first two nights were difficult. He was experiencing nausea the first couple days. He is taking Buspar TID since yesterday. He has been constipated for the past 3 days. He has Metamucil at home, but has not taken any. Patient Active Problem List   Diagnosis Code    Essential hypertension I10    Type 2 diabetes mellitus without complication ([address] Y90.7    Morbid obesity ([facility] E66.01    Renal calculus, left N20.0        Current Outpatient Prescriptions on File Prior to Visit   Medication Sig Dispense Refill    busPIRone (BUSPAR) 10 mg tablet Take 1 Tab by mouth three (3) times daily for 30 days. 90 Tab 0    metFORMIN (GLUCOPHAGE) 500 mg tablet TAKE 1 TABLET BY MOUTH TWICE A DAY 60 Tab 0    lisinopril (PRINIVIL, ZESTRIL) 5 mg tablet Take 1 Tab by mouth daily. 90 Tab 1    VIT B COMP-C-FA-IRON-VIT E PO Take  by mouth. No current facility-administered medications on file prior to visit.         Allergies   Allergen Reactions    Wellbutrin [Bupropion Hcl] Unknown (comments)       Past Medical History: Diagnosis Date    Diabetes ([facility]     Hypertension        Past Surgical History:   Procedure Laterality Date    HX TONSILLECTOMY  200       Family History   Problem Relation Age of Onset    Hypertension Mother     Headache Mother     Headache Sister     Diabetes Maternal Grandfather        Social History     Social History    Marital status:      Spouse name: N/A    Number of children: N/A    Years of education: N/A     Occupational History    Not on file.      Social History Main Topics    Smoking status: Never Smoker    Smokeless tobacco: Never Used    Alcohol use No    Drug use: No    Sexual activity: Yes     Partners: Female     Other Topics Concern    Not on file     Social History Narrative       Office Visit on 06/19/2018   Component Date Value Ref Range Status    Hemoglobin A1c 06/19/2018 6.3* 4.8 - 5.6 % Final    Estimated average glucose 06/19/2018 134  mg/dL Final    Cholesterol, total 06/19/2018 177  100 - 199 mg/dL Final    Triglyceride 06/19/2018 60  0 - 149 mg/dL Final    HDL Cholesterol 06/19/2018 48  >39 mg/dL Final    VLDL, calculated 06/19/2018 12  5 - 40 mg/dL Final    LDL, calculated 06/19/2018 117* 0 - 99 mg/dL Final    Glucose 06/19/2018 99  65 - 99 mg/dL Final    BUN 06/19/2018 10  6 - 24 mg/dL Final    Creatinine 06/19/2018 0.93  0.76 - 1.27 mg/dL Final    GFR est non-AA 06/19/2018 100  >59 mL/min/1.73 Final    GFR est AA 06/19/2018 116  >59 mL/min/1.73 Final    BUN/Creatinine ratio 06/19/2018 11  9 - 20 Final    Sodium 06/19/2018 139  134 - 144 mmol/L Final    Potassium 06/19/2018 4.7  3.5 - 5.2 mmol/L Final    Chloride 06/19/2018 104  96 - 106 mmol/L Final    CO2 06/19/2018 20  20 - 29 mmol/L Final    Calcium 06/19/2018 10.0  8.7 - 10.2 mg/dL Final    Protein, total 06/19/2018 7.5  6.0 - 8.5 g/dL Final    Albumin 06/19/2018 4.6  3.5 - 5.5 g/dL Final    GLOBULIN, TOTAL 06/19/2018 2.9  1.5 - 4.5 g/dL Final    A-G Ratio 06/19/2018 1.6  1.2 - 2.2 Final    Bilirubin, total 06/19/2018 0.3  0.0 - 1.2 mg/dL Final    Alk. phosphatase 06/19/2018 56  39 - 117 IU/L Final    AST (SGOT) 06/19/2018 16  0 - 40 IU/L Final    ALT (SGPT) 06/19/2018 23  0 - 44 IU/L Final       Review of Systems   Constitutional: Positive for fever. Negative for malaise/fatigue. HENT: Negative for congestion. Eyes: Negative for blurred vision and pain. Respiratory: Positive for cough. Negative for shortness of breath. Cardiovascular: Negative for chest pain and palpitations. Gastrointestinal: Positive for constipation. Negative for abdominal pain and heartburn. Genitourinary: Negative for frequency and urgency. Musculoskeletal: Negative for joint pain and myalgias. Neurological: Positive for dizziness and headaches. Negative for tingling, sensory change and weakness. Psychiatric/Behavioral: Positive for depression. Negative for memory loss and substance abuse. Visit Vitals    BP (!) 140/96 (BP 1 Location: Right arm, BP Patient Position: Sitting)    Pulse 94    Temp 98.5 °F (36.9 °C) (Oral)    Resp 16    Ht 6' 1\" (1.854 m)    Wt 331 lb 3.2 oz (150.2 kg)    SpO2 97%    BMI 43.7 kg/m2         Physical Exam   Constitutional: He is oriented to person, place, and time. He appears well-developed. No distress. Morbidly obese   HENT:   Right Ear: External ear normal.   Left Ear: External ear normal.   Eyes: Conjunctivae and EOM are normal. Right eye exhibits no discharge. Left eye exhibits no discharge. Neck: Normal range of motion. Neck supple. Cardiovascular: Normal rate and regular rhythm. Pulmonary/Chest: Effort normal and breath sounds normal. He has no wheezes. Abdominal: Soft. Bowel sounds are normal. There is no tenderness. Lymphadenopathy:     He has no cervical adenopathy. Neurological: He is alert and oriented to person, place, and time. Skin: He is not diaphoretic. Psychiatric: He has a normal mood and affect.  His behavior is normal. tearful   Nursing note and vitals reviewed. ASSESSMENT and PLAN    ICD-10-CM ICD-9-CM    1. Essential hypertension I10 401.9 BP is well-controlled on current medication. No change to dosage at this time. 2. Well controlled diabetes mellitus ([facility] [address] E11.9 250.00 He is taking metformin 500 mg and his diabetes is well controlled. His HA1c was 6.3% on 06/19.   3. Anxiety F41.9 300.00 ALPRAZolam (XANAX) 0.5 mg tablet script given to patient. Xanax 0.5 mg prescribed. He should only take Xanax as needed until Buspar starts working. He is taking Buspar 10 mg TID. 4. Chronic fatigue R53.82 780.79 He should try working from home or out of the office to determine if his office environment is contributing to his fatigue. If Metamucil does not help for constipation , call office for prescription laxative. This plan was reviewed with the patient and patient agrees. All questions were answered. This scribe documentation was reviewed by me and accurately reflects the examination and decisions made by me. This note will not be viewable in [address]

## 2018-06-25 NOTE — MR AVS SNAPSHOT
[address] [facility] [address] [place] [address] [address] 
[phone] Patient: Young Johnson MRN: [id number] :[id number] Visit Information Date & Time Provider Department Dept. Phone Encounter #  
 2018  2:00 PM Santos Gar MD [facility] Internal Medicine [phone] [id number] Your Appointments 2018 11:40 AM  
Any with Ishaan Saavedra MD  
[address] [place] [address] ([facility]) Appt Note: 1st adherence [address] [facility] [address]  
  
   
 [address] [facility] [address] [place] [phone] Upcoming Health Maintenance Date Due  
 EYE EXAM RETINAL OR DILATED Q1 1984 DTaP/Tdap/Td series (1 - Tdap) 1995 FOOT EXAM Q1 2016 MICROALBUMIN Q1 2017 Influenza Age 5 to Adult 2018 HEMOGLOBIN A1C Q6M 2018 LIPID PANEL Q1 2019 Allergies as of 2018  Review Complete On: 2018 By: Santos Gar MD  
  
 Severity Noted Reaction Type Reactions Wellbutrin [Bupropion Hcl]  2018    Unknown (comments) Current Immunizations  Never Reviewed Name Date Influenza Vaccine Intradermal PF 2015 Not reviewed this visit You Were Diagnosed With   
  
 Codes Comments Essential hypertension    -  Primary ICD-10-CM: I10 
ICD-9-CM: 401.9 Well controlled diabetes mellitus ([facility]     ICD-10-CM: E11.9 ICD-9-CM: 250.00 Anxiety     ICD-10-CM: F41.9 ICD-9-CM: 300.00 Chronic fatigue     ICD-10-CM: R53.82 
ICD-9-CM: 780.79 Vitals BP Pulse Temp Resp Height(growth percentile) Weight(growth percentile) 136/88 (BP 1 Location: Right arm, BP Patient Position: Sitting) 94 98.5 °F (36.9 °C) (Oral) 16 6' 1\" (1.854 m) 331 lb 3.2 oz (150.2 kg) SpO2 BMI Smoking Status 97% 43.7 kg/m2 Never Smoker Vitals History BMI and BSA Data Body Mass Index Body Surface Area 43.7 kg/m 2 2.78 m 2 Preferred Pharmacy Pharmacy Name Phone [id number] [phone] Your Updated Medication List  
  
   
This list is accurate as of 6/25/18  3:23 PM.  Always use your most recent med list.  
  
  
  
  
 ALPRAZolam 0.5 mg tablet Commonly known as:  Will Brannon Take 1 Tab by mouth nightly as needed for Anxiety for up to 5 doses. Max Daily Amount: 0.5 mg.  
  
 busPIRone 10 mg tablet Commonly known as:  BUSPAR Take 1 Tab by mouth three (3) times daily for 30 days. lisinopril 5 mg tablet Commonly known as:  Karn Desanctis Take 1 Tab by mouth daily. metFORMIN 500 mg tablet Commonly known as:  GLUCOPHAGE  
TAKE 1 TABLET BY MOUTH TWICE A DAY  
  
 VIT B COMP-C-FA-IRON-VIT E PO Take  by mouth. Prescriptions Printed Refills ALPRAZolam (XANAX) 0.5 mg tablet 0 Sig: Take 1 Tab by mouth nightly as needed for Anxiety for up to 5 doses. Max Daily Amount: 0.5 mg.  
 Class: Print Route: Oral  
  
Introducing [facility] HE[facility] SERVICES! Dear Tasha Miles: 
Thank you for requesting a [employer] account. Our records indicate that you already have an active [employer] account. You can access your account anytime at [email or web address] Did you know that you can access your hospital and ER discharge instructions at any time in [employer]? You can also review all of your test results from your hospital stay or ER visit. Additional Information If you have questions, please visit the Frequently Asked Questions section of the [employer] website at [email or web address] Remember, [employer] is NOT to be used for urgent needs. For medical emergencies, dial 911. Now available from your iPhone and Android! Please provide this summary of care documentation to your next provider. Your primary care clinician is listed as Veronica Mas. If you have any questions after today's visit, please call [phone].

## 2018-07-16 DIAGNOSIS — F41.1 GENERALIZED ANXIETY DISORDER: ICD-10-CM

## 2018-07-18 RX ORDER — BUSPIRONE HYDROCHLORIDE 10 MG/1
TABLET ORAL
Qty: 90 TAB | Refills: 0 | Status: SHIPPED | OUTPATIENT
Start: 2018-07-18 | End: 2018-12-10 | Stop reason: SDUPTHER

## 2018-08-01 DIAGNOSIS — F41.9 ANXIETY: ICD-10-CM

## 2018-08-01 RX ORDER — ALPRAZOLAM 0.5 MG/1
0.5 TABLET ORAL
Qty: 5 TAB | Refills: 0 | Status: SHIPPED | OUTPATIENT
Start: 2018-08-01 | End: 2021-01-12 | Stop reason: ALTCHOICE

## 2018-08-05 DIAGNOSIS — E11.9 TYPE 2 DIABETES MELLITUS WITHOUT COMPLICATION, WITHOUT LONG-TERM CURRENT USE OF INSULIN (HCC): ICD-10-CM

## 2018-08-05 RX ORDER — METFORMIN HYDROCHLORIDE 500 MG/1
TABLET ORAL
Qty: 60 TAB | Refills: 0 | Status: SHIPPED | OUTPATIENT
Start: 2018-08-05 | End: 2018-11-15 | Stop reason: SDUPTHER

## 2018-08-11 DIAGNOSIS — F41.1 GENERALIZED ANXIETY DISORDER: ICD-10-CM

## 2018-08-12 RX ORDER — BUSPIRONE HYDROCHLORIDE 10 MG/1
TABLET ORAL
Qty: 90 TAB | Refills: 0 | Status: SHIPPED | OUTPATIENT
Start: 2018-08-12 | End: 2019-08-08 | Stop reason: ALTCHOICE

## 2018-08-23 ENCOUNTER — DOCUMENTATION ONLY (OUTPATIENT)
Dept: OPHTHALMOLOGY | Age: 43
End: 2018-08-23

## 2018-08-24 NOTE — PROGRESS NOTES
Outpatient Ophthalmology Note    Date of Service: August 23, 2018    Facility: [facility] MD DAR[place], [facility], [facility], [address] [place] [address], [address]  Phone: [phone]; Fax: [phone]    Ophthalmologist: Mariely Fortune MD      Dear Dr. Tanja Sears: Thank you for allowing me to participate in the care of your patient. Please find my notes below. Please dont hesitate to contact me with any questions. Chief Complaint: Follow up, possible CEE. History of Present Illness: Dewayne Lucas is a 39 yrs. old male visiting today for a follow up and possbile CEE. Patient states when he focuses on an object he can see the object then a part of the object to the side. he has had times more frequently in the last 6 months where he can closes his eyes and open them and have double vision - sees not just 1.5 of an object but 2 of the same. The episodes seem to last from 5-15 minutes most times but has lasted up to 30 minutes . He is unsure what causes the episodes. They have happened when he was eating, working, etc and then also not happened as he was doing the same things. Patient also reports he is having a hard time reading up close. He is having headaches with glasses. He has not had many central blurry vision episodes in 2 years, only 3. Reviewed labs in [facility] and had normal B12, folate, Lyme and SALVADOR and saw Dr. Aysha Crawford and was dx with 4th nerve palsy.  His recent HbA1c was 6.3..     Right Eye Left Eye   Vision: Distance  (without glasses) 20 /30 20 /20     Vision: Pinhole 20 /20-2 20 /   Intraocular Pressure (IOP) By Applanation - Un-dilated  @ - 03:05 PM 16  mmHg   16 mmHg       Slit Lamp Exam:   Right Eye  Left Eye    External - Normal - papilloma external   Conjunctiva - concretions inside lower lids - concretions inside lower lids   Cornea - clear - clear   Anterior Chamber - deep and quiet - deep and quiet   Iris - round and reactive - round and reactive   Lens - clear - clear   Angle - Open to SS - Open to SS     Dilated Fundus Exam:   Right Eye Left Eye   Vitreous clear clear   Cup to Disc Ratio 0.3 0.25   Optic Disc normal normal   Macula normal, sharp +FLR normal, sharp +FLR   Retinal Vessels normal normal   Periphery normal normal       Assessment / Diagnosis:   1. Double vision   - does not map to 4th nerve palsy (Right HT worse in left gaze and left head tilt, better in right head tilt) but better with 1 PD BD over right eye for distance only, not needed for reading. Unsure if can make glasses with prism only in superior part of lens. - worse with fatigue and can have droopy eyelids as well. 2. Transient vision blurring focally central Right eye and now new crescent peripheral left eye blurring.  - much better since last visit. 3. Trouble seeing in the dark   - much better since last visit     4. Diabetes x 4 years (dilated exam on 9/28/2016)  - no retinopathy   - no macular edema    5. 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     Patient to keep journal of symptoms - triggers and after-effects    Letter to Dr. Yu Hartman - Note placed into EP[facility]     Instructions for Patient Checkout:  F/u in 4-6 month to recheck and review test results    Sincerely,     Diana Greene MD

## 2018-10-23 ENCOUNTER — OFFICE VISIT (OUTPATIENT)
Dept: SLEEP MEDICINE | Age: 43
End: 2018-10-23

## 2018-10-23 VITALS
SYSTOLIC BLOOD PRESSURE: 127 MMHG | WEIGHT: 315 LBS | HEART RATE: 100 BPM | DIASTOLIC BLOOD PRESSURE: 81 MMHG | BODY MASS INDEX: 41.75 KG/M2 | OXYGEN SATURATION: 95 % | HEIGHT: 73 IN

## 2018-10-23 DIAGNOSIS — Z78.9 INTOLERANCE OF CONTINUOUS POSITIVE AIRWAY PRESSURE (CPAP) VENTILATION: ICD-10-CM

## 2018-10-23 DIAGNOSIS — G47.33 OSA (OBSTRUCTIVE SLEEP APNEA): Primary | ICD-10-CM

## 2018-10-23 DIAGNOSIS — I10 ESSENTIAL HYPERTENSION: ICD-10-CM

## 2018-10-23 RX ORDER — MODAFINIL 200 MG/1
200 TABLET ORAL
Qty: 30 TAB | Refills: 6 | Status: SHIPPED | OUTPATIENT
Start: 2018-10-23 | End: 2019-04-30 | Stop reason: SDUPTHER

## 2018-10-23 NOTE — PATIENT INSTRUCTIONS
[address] [place]. [place], [address]  Tel.  [phone]  Fax. 100 [facility] 60  [place], [address] [facility]  Tel.  [phone]  Fax. [phone] [address]  Tel.  [phone]  Fax. [phone]     Learning About CPAP for Sleep Apnea  What is CPAP? CPAP is a small machine that you use at home every night while you sleep. It increases air pressure in your throat to keep your airway open. When you have sleep apnea, this can help you sleep better so you feel much better. CPAP stands for \"continuous positive airway pressure. \"  The CPAP machine will have one of the following:  · A mask that covers your nose and mouth  · Prongs that fit into your nose  · A mask that covers your nose only, the most common type. This type is called NCPAP. The N stands for \"nasal.\"  Why is it done? CPAP is usually the best treatment for obstructive sleep apnea. It is the first treatment choice and the most widely used. Your doctor may suggest CPAP if you have:  · Moderate to severe sleep apnea. · Sleep apnea and coronary artery disease (CAD) or heart failure. How does it help? · CPAP can help you have more normal sleep, so you feel less sleepy and more alert during the daytime. · CPAP may help keep heart failure or other heart problems from getting worse. · NCPAP may help lower your blood pressure. · If you use CPAP, your bed partner may also sleep better because you are not snoring or restless. What are the side effects? Some people who use CPAP have:  · A dry or stuffy nose and a sore throat. · Irritated skin on the face. · Sore eyes. · Bloating. If you have any of these problems, work with your doctor to fix them. Here are some things you can try:  · Be sure the mask or nasal prongs fit well. · See if your doctor can adjust the pressure of your CPAP. · If your nose is dry, try a humidifier.   · If your nose is runny or stuffy, try decongestant medicine or a steroid nasal spray. If these things do not help, you might try a different type of machine. Some machines have air pressure that adjusts on its own. Others have air pressures that are different when you breathe in than when you breathe out. This may reduce discomfort caused by too much pressure in your nose. Where can you learn more? Go to ZENT.be  Enter Rogelio Watts in the search box to learn more about \"Learning About CPAP for Sleep Apnea. \"   © 8184-1711 Healthwise, Incorporated. Care instructions adapted under license by [employer] (which disclaims liability or warranty for this information). This care instruction is for use with your licensed healthcare professional. If you have questions about a medical condition or this instruction, always ask your healthcare professional. [address] any warranty or liability for your use of this information. Content Version: 8.2.90495; Last Revised: January 11, 2010  PROPER SLEEP HYGIENE    What to avoid  · Do not have drinks with caffeine, such as coffee or black tea, for 8 hours before bed. · Do not smoke or use other types of tobacco near bedtime. Nicotine is a stimulant and can keep you awake. · Avoid drinking alcohol late in the evening, because it can cause you to wake in the middle of the night. · Do not eat a big meal close to bedtime. If you are hungry, eat a light snack. · Do not drink a lot of water close to bedtime, because the need to urinate may wake you up during the night. · Do not read or watch TV in bed. Use the bed only for sleeping and sexual activity. What to try  · Go to bed at the same time every night, and wake up at the same time every morning. Do not take naps during the day. · Keep your bedroom quiet, dark, and cool. · Get regular exercise, but not within 3 to 4 hours of your bedtime. .  · Sleep on a comfortable pillow and mattress.   · If watching the clock makes you anxious, turn it facing away from you so you cannot see the time. · If you worry when you lie down, start a worry book. Well before bedtime, write down your worries, and then set the book and your concerns aside. · Try meditation or other relaxation techniques before you go to bed. · If you cannot fall asleep, get up and go to another room until you feel sleepy. Do something relaxing. Repeat your bedtime routine before you go to bed again. · Make your house quiet and calm about an hour before bedtime. Turn down the lights, turn off the TV, log off the computer, and turn down the volume on music. This can help you relax after a busy day. Drowsy Driving: The [employer] cites drowsiness as a causing factor in more than 810,267 police reported crashes annually, resulting in 76,000 injuries and 1,500 deaths. Other surveys suggest 55% of people polled have driven while drowsy in the past year, 23% had fallen asleep but not crashed, 3% crashed, and 2% had and accident due to drowsy driving. Who is at risk? Young Drivers: One study of drowsy driving accidents states that 55% of the drivers were under 25 years. Of those, 75% were male. Shift Workers and Travelers: People who work overnight or travel across time zones frequently are at higher risk of experiencing Circadian Rhythm Disorders. They are trying to work and function when their body is programed to sleep. Sleep Deprived: Lack of sleep has a serious impact on your ability to pay attention or focus on a task. Consistently getting less than the average of 8 hours your body needs creates partial or cumulative sleep deprivation. Untreated Sleep Disorders: Sleep Apnea, Narcolepsy, R.L.S., and other sleep disorders (untreated) prevent a person from getting enough restful sleep. This leads to excessive daytime sleepiness and increases the risk for drowsy driving accidents by up to 7 times.   Medications / Alcohol: Even over the counter medications can cause drowsiness. Medications that impair a drivers attention should have a warning label. Alcohol naturally makes you sleepy and on its own can cause accidents. Combined with excessive drowsiness its effects are amplified. Signs of Drowsy Driving:   * You don't remember driving the last few miles   * You may drift out of your jimbo   * You are unable to focus and your thoughts wander   * You may yawn more often than normal   * You have difficulty keeping your eyes open / nodding off   * Missing traffic signs, speeding, or tailgating  Prevention-   Good sleep hygiene, lifestyle and behavioral choices have the most impact on drowsy driving. There is no substitute for sleep and the average person requires 8 hours nightly. If you find yourself driving drowsy, stop and sleep. Consider the sleep hygiene tips provided during your visit as well. Medication Refill Policy: Refills for all medications require 1 week advance notice. Please have your pharmacy fax a refill request. We are unable to fax, or call in \"controled substance\" medications and you will need to pick these prescriptions up from our office. [employer] Activation    Thank you for requesting access to [employer]. Please follow the instructions below to securely access and download your online medical record. [employer] allows you to send messages to your doctor, view your test results, renew your prescriptions, schedule appointments, and more. How Do I Sign Up? 1. In your internet browser, go to [email or web address]. 2. Click on the First Time User? Click Here link in the Sign In box. You will see the New Member Sign Up page. 3. Enter your [employer] Access Code exactly as it appears below. You will not need to use this code after youve completed the sign-up process. If you do not sign up before the expiration date, you must request a new code. [employer] Access Code:  Activation code not generated  Current MyWedding Status: Active (This is the date your MyWedding access code will )    4. Enter the last four digits of your Social Security Number (xxxx) and Date of Birth (mm/dd/yyyy) as indicated and click Submit. You will be taken to the next sign-up page. 5. Create a [employer] ID. This will be your MyWedding login ID and cannot be changed, so think of one that is secure and easy to remember. 6. Create a MyWedding password. You can change your password at any time. 7. Enter your Password Reset Question and Answer. This can be used at a later time if you forget your password. 8. Enter your e-mail address. You will receive e-mail notification when new information is available in [address] 9. Click Sign Up. You can now view and download portions of your medical record. 10. Click the Download Summary menu link to download a portable copy of your medical information. Additional Information    If you have questions, please call [phone]. Remember, MyWedding is NOT to be used for urgent needs. For medical emergencies, dial 911.

## 2018-10-23 NOTE — PROGRESS NOTES
[address] [facility]. [place], [address]  Tel.  [phone]  Fax. 100 [facility] 60  [place], [address] [facility]  Tel.  [phone]  Fax. [phone] [address] [facility] [address]   Tel.  [phone]  Fax. [phone]     S>Rich Lowery is a 39 y.o. male seen for a positive airway pressure follow-up. He reports problems using the device. He is 17% compliant over the past 30 days. The following problems are identified:    Drowsiness no Problems exhaling no   Snoring no Forget to put on no   Mask Comfortable yes Can't fall asleep no   Dry Mouth no Mask falls off no   Air Leaking no Frequent awakenings no       He admits that his sleep has not improved. He reports that as the pressure builds up \" I have seen the pressure being as high as 19\", mask lifts off face leading to dry eyes / mouth leading to discontinuation of therapy. He has tried a couple of different mask and has pressure relief enabled on his device. Sometimes he would wake up find that he has discontinued therapy during the night with recall. He also reports of getting winded following discontinuation of therapy for a few minutes. Allergies   Allergen Reactions    Wellbutrin [Bupropion Hcl] Unknown (comments)       He has a current medication list which includes the following prescription(s): metformin, lisinopril, buspirone, alprazolam, buspirone, and vit b comp/c/folic/iron/vit e..      He  has a past medical history of Diabetes ([address] and Hypertension.     [place] Sleepiness Score: 15   and Modified F.O.S.Q. Score Total / 2: 12      O>    Visit Vitals  /81   Pulse 100   Ht 6' 1\" (1.854 m)   Wt 347 lb (157.4 kg)   SpO2 95%   BMI 45.78 kg/m²         General:   Not in acute distress   Eyes:  Anicteric sclerae, no obvious strabismus   Nose:  No obvious nasal septum deviation    Oropharynx:   Class 4 oropharyngeal outlet, thick tongue base, uvula not seen due to low-lying soft palate, narrow tonsilo-pharyngeal pilars   Tonsils:   tonsils are not visualized due to low-lying soft palate   Neck:   midline trachea   Chest/Lungs:  Equal lung expansion, clear on auscultation    CVS:  Normal rate, regular rhythm; no JVD   Skin:  Warm to touch; no obvious rashes   Neuro:  No focal deficits ; no obvious tremor    Psych:  Normal affect,  normal countenance;           A>    ICD-10-CM ICD-9-CM    1. JOY (obstructive sleep apnea) G47.33 327.23 SLEEP LAB (PAP TITRATION)   2. BMI 45.0-49.9, adult (HCC) Z68.42 V85.42    3. Intolerance of continuous positive airway pressure (CPAP) ventilation Z78.9 V49.89    4. Essential hypertension I10 401.9      AHI = 22 (2018). On Resmed :  APAP 4 -20 cmH2O. Compliant:      no    Therapeutic Response:  Negative    P>    * Device pressure change to Resmed  APAP 9 - 15 cmH2O. * Bi-Level titration ordered due to APAP Failure. Orders Placed This Encounter    SLEEP LAB (PAP TITRATION)     Standing Status:   Future     Standing Expiration Date:   4/23/2019     Scheduling Instructions:      Perform Bi-level Titration. Order Specific Question:   Reason for Exam     Answer:   joy    was prescribed. He was in formed on this medications including side effects and adverse reactions profile. * We have recommended a dedicated weight loss through appropriate diet and an exercise regiment as significant weight reduction has been shown to reduce severity of obstructive sleep apnea. * Follow-up Disposition:  Return in about 6 months (around 4/23/2019), or if symptoms worsen or fail to improve. * He was asked to contact our office for any problems regarding PAP therapy. * Counseling was provided regarding the importance of regular PAP use and on proper sleep hygiene and safe driving. * Re-enforced proper and regular cleaning for the device. Thank you for allowing us to participate in your patient's medical care.       Savanna Soares MD, VEL  Electronically signed.  10/23/18

## 2018-11-15 DIAGNOSIS — I10 HYPERTENSION, UNSPECIFIED TYPE: ICD-10-CM

## 2018-11-15 DIAGNOSIS — E11.9 TYPE 2 DIABETES MELLITUS WITHOUT COMPLICATION, WITHOUT LONG-TERM CURRENT USE OF INSULIN (HCC): ICD-10-CM

## 2018-11-16 RX ORDER — LISINOPRIL 5 MG/1
TABLET ORAL
Qty: 90 TAB | Refills: 1 | Status: SHIPPED | OUTPATIENT
Start: 2018-11-16 | End: 2019-05-04 | Stop reason: SDUPTHER

## 2018-11-16 RX ORDER — METFORMIN HYDROCHLORIDE 500 MG/1
TABLET ORAL
Qty: 60 TAB | Refills: 0 | Status: SHIPPED | OUTPATIENT
Start: 2018-11-16 | End: 2018-12-10 | Stop reason: SDUPTHER

## 2018-12-10 DIAGNOSIS — E11.9 TYPE 2 DIABETES MELLITUS WITHOUT COMPLICATION, WITHOUT LONG-TERM CURRENT USE OF INSULIN (HCC): ICD-10-CM

## 2018-12-10 DIAGNOSIS — F41.1 GENERALIZED ANXIETY DISORDER: ICD-10-CM

## 2018-12-11 RX ORDER — BUSPIRONE HYDROCHLORIDE 10 MG/1
TABLET ORAL
Qty: 270 TAB | Refills: 0 | Status: SHIPPED | OUTPATIENT
Start: 2018-12-11 | End: 2019-04-19 | Stop reason: SDUPTHER

## 2018-12-11 RX ORDER — METFORMIN HYDROCHLORIDE 500 MG/1
TABLET ORAL
Qty: 180 TAB | Refills: 0 | Status: SHIPPED | OUTPATIENT
Start: 2018-12-11 | End: 2019-04-30 | Stop reason: SDUPTHER

## 2018-12-11 NOTE — TELEPHONE ENCOUNTER
Request 90 day  Last office visit 6/25/2018  Last med refill  buspar 8/12/2018  Metformin 11/16/2018

## 2019-01-02 ENCOUNTER — TELEPHONE (OUTPATIENT)
Dept: SLEEP MEDICINE | Age: 44
End: 2019-01-02

## 2019-01-02 NOTE — TELEPHONE ENCOUNTER
Titration sleep study denied.  [facility] will be calling for a p2p on 1-3-19 at 11:40AM.

## 2019-01-03 ENCOUNTER — TELEPHONE (OUTPATIENT)
Dept: SLEEP MEDICINE | Age: 44
End: 2019-01-03

## 2019-01-03 NOTE — TELEPHONE ENCOUNTER
Patient would not like to come in for a titration sleep study. He states that his deductible is too high. Please advise next steps.

## 2019-01-04 ENCOUNTER — TELEPHONE (OUTPATIENT)
Dept: SLEEP MEDICINE | Age: 44
End: 2019-01-04

## 2019-01-04 NOTE — TELEPHONE ENCOUNTER
Spoke with the patient regarding a titration study. He does not want to have the testing due to high deductibles.

## 2019-04-10 NOTE — TELEPHONE ENCOUNTER
PAP download shows 1 day use in prior 60 days. I would recommend that I contact patient to discuss options. Prior AHI = 22 (2018).   Pt declined titration study in January due to concerns over cost.        Najma Pierre NP, [facility]  04/10/19

## 2019-04-16 NOTE — TELEPHONE ENCOUNTER
Contacted Pt regarding potential titration for change to BiPAP, left message on mobile VM per pt consent in 4/3/18 office visit with Dr. Kristen Stanton. Options include titration to change to BiPAP or possibly oral appliance. Original AHI of 22 in 2018.     Javed Chaney NP, [facility]  04/16/19

## 2019-04-19 ENCOUNTER — TELEPHONE (OUTPATIENT)
Dept: SLEEP MEDICINE | Age: 44
End: 2019-04-19

## 2019-04-19 DIAGNOSIS — F41.1 GENERALIZED ANXIETY DISORDER: ICD-10-CM

## 2019-04-19 RX ORDER — BUSPIRONE HYDROCHLORIDE 10 MG/1
TABLET ORAL
Qty: 270 TAB | Refills: 0 | Status: SHIPPED | OUTPATIENT
Start: 2019-04-19 | End: 2019-08-08 | Stop reason: ALTCHOICE

## 2019-04-19 NOTE — TELEPHONE ENCOUNTER
Pt. Not currently compliant on APAP, original AHI of 22 (2018). He declined titration in January 2019 based on cost concerns. Alternatives include oral appliance and BiPAP. Called pt and left voicemail to return my call if he would like to discuss.     Marina Juares NP, [facility]  04/19/19

## 2019-04-30 DIAGNOSIS — G47.33 OSA (OBSTRUCTIVE SLEEP APNEA): ICD-10-CM

## 2019-05-10 RX ORDER — MODAFINIL 200 MG/1
200 TABLET ORAL
Qty: 30 TAB | Refills: 0 | Status: SHIPPED | OUTPATIENT
Start: 2019-05-10 | End: 2019-12-03 | Stop reason: ALTCHOICE

## 2019-05-10 NOTE — TELEPHONE ENCOUNTER
Spoke with patient, he is not able to come in for a titration at this time due to cost.  He is willing to attempt PAP use again. He previously had issues with dryness and discomfort. We discussed humidification and he will adjust settings on machine as needed. He does report some improvement with sleep in new residence with new bed that allows him to sleep with his head elevated, however he is still very sleepy in the morning and reports tiredness while driving. He would like his modafinil 200 mg prescription filled so that he is able to take medication PRN when he is extremely sleepy. ASSESSMENT:    Patient has a history is consistent with the diagnosis of sleep apnea. AHI = 22 (2018). On Resmed :  APAP 4 -20 cmH2O. Not currently compliant. PLAN:     * I will renew his existing modafinil 200 mg prescription for a 30 day supply with no refills. Orders Placed This Encounter    modafinil (PROVIGIL) 200 mg tablet     Sig: Take 1 Tab by mouth every morning. Max Daily Amount: 200 mg. Indications: Sleepiness Due To Obstructive Sleep Apnea     Dispense:  30 Tab     Refill:  0       * Download PAP compliance data in 2 weeks to assess adherence    Counseling was provided regarding the importance of regular PAP use with emphasis on ensuring sufficient total sleep time, proper sleep hygiene, and safe driving.     Dorinda Cox NP, [address] [place] [address]  05/10/19

## 2019-05-15 DIAGNOSIS — E11.9 TYPE 2 DIABETES MELLITUS WITHOUT COMPLICATION, WITHOUT LONG-TERM CURRENT USE OF INSULIN (HCC): ICD-10-CM

## 2019-05-15 RX ORDER — METFORMIN HYDROCHLORIDE 500 MG/1
TABLET ORAL
Qty: 60 TAB | Refills: 0 | Status: SHIPPED | OUTPATIENT
Start: 2019-05-15 | End: 2019-08-16 | Stop reason: ALTCHOICE

## 2019-05-24 ENCOUNTER — DOCUMENTATION ONLY (OUTPATIENT)
Dept: SLEEP MEDICINE | Age: 44
End: 2019-05-24

## 2019-08-08 ENCOUNTER — OFFICE VISIT (OUTPATIENT)
Dept: PRIMARY CARE CLINIC | Age: 44
End: 2019-08-08

## 2019-08-08 VITALS
BODY MASS INDEX: 41.75 KG/M2 | OXYGEN SATURATION: 96 % | RESPIRATION RATE: 18 BRPM | HEART RATE: 67 BPM | HEIGHT: 73 IN | TEMPERATURE: 98.1 F | DIASTOLIC BLOOD PRESSURE: 81 MMHG | SYSTOLIC BLOOD PRESSURE: 122 MMHG | WEIGHT: 315 LBS

## 2019-08-08 DIAGNOSIS — E66.01 MORBID OBESITY (HCC): ICD-10-CM

## 2019-08-08 DIAGNOSIS — I10 ESSENTIAL HYPERTENSION: ICD-10-CM

## 2019-08-08 DIAGNOSIS — E11.9 TYPE 2 DIABETES MELLITUS WITHOUT COMPLICATION, WITHOUT LONG-TERM CURRENT USE OF INSULIN (HCC): Primary | ICD-10-CM

## 2019-08-08 DIAGNOSIS — F41.1 GENERALIZED ANXIETY DISORDER: ICD-10-CM

## 2019-08-08 DIAGNOSIS — R20.2 NUMBNESS AND TINGLING OF FOOT: ICD-10-CM

## 2019-08-08 DIAGNOSIS — R20.0 NUMBNESS AND TINGLING OF FOOT: ICD-10-CM

## 2019-08-08 DIAGNOSIS — H53.8 BLURRED VISION, BILATERAL: ICD-10-CM

## 2019-08-08 DIAGNOSIS — E55.9 VITAMIN D DEFICIENCY: ICD-10-CM

## 2019-08-08 DIAGNOSIS — L03.032 CELLULITIS OF GREAT TOE OF LEFT FOOT: ICD-10-CM

## 2019-08-08 RX ORDER — CIPROFLOXACIN 500 MG/1
500 TABLET ORAL 2 TIMES DAILY
Qty: 20 TAB | Refills: 0 | Status: SHIPPED | OUTPATIENT
Start: 2019-08-08 | End: 2019-08-18

## 2019-08-08 RX ORDER — LISINOPRIL 5 MG/1
TABLET ORAL DAILY
COMMUNITY
End: 2019-12-03 | Stop reason: SDUPTHER

## 2019-08-08 NOTE — PROGRESS NOTES
Visit Vitals  /81 (BP 1 Location: Left arm, BP Patient Position: Sitting)   Pulse 67   Temp 98.1 °F (36.7 °C) (Oral)   Resp 18   Ht 6' 1\" (1.854 m)   Wt (!) 359 lb 9.6 oz (163.1 kg)   SpO2 96%   BMI 47.44 kg/m²           Chief Complaint   Patient presents with    Follow Up Chronic Condition     Hypertension, Diabetes     Foot Pain     Bilateral Foot Pain, Discoloration, Numbness h/o diabetes     Ingrown Toenail     L Great Toe, L pinky toe infection    Vision Change     Follow-up from seeing the eye Doctor                1. Have you been to the ER, urgent care clinic since your last visit? Hospitalized since your last visit? Denies     2. Have you seen or consulted any other health care providers outside of the [address] [place] [address] since your last visit? Include any pap smears or colon screening.  Eye Doctor

## 2019-08-08 NOTE — PROGRESS NOTES
Written by Daane Leary, as dictated by Dr. Caro Gonzalez MD.    Corrie Cooks is a 38 y.o. male. HPI  The patient presents today for a follow-up. Pt is accompanied by his wife, who participates in sharing the pt's history. Pt is fasting for labs. Pt reports that he previously had not been taking Metformin at night. But is currently taking his Metformin 500 mg BID. Pt reports that he has signed up for a weight loss program, and is on his second week of the program. Pt admits that he has not been walking 5,000 steps a day. He works at [employer] and states that it is due to the foot pain. Pt states he takes an OTC multivitamin. Pt reports foot pain. Pt reports feeling pressure, and reduced feeling. He does not recall what could have happened. He believes that it might be an ingrown toenail. He reports feeling numbness in his toes, sometimes it feels like its burning, freezing or both. When he would go to poke it, he reports not feeling anything. Pt's wife reports that he tends to pick at scabs, and believes that that might be the cause of the toe pain. Pt reports that he has blurry vision and nearsightedness. He was told that this was normal as he ages. He is followed Dr. Destiny Cortez (ophthamology). He believes that he saw her earlier this year, but his wife reports that he last saw her before 11/2018. Pt reports short pains in his chest when in stressful situations. Pt reports that his anxiety has been ok. He reports that they are going to counseling. Pt stopped taking Buspar 10 mg. There were several days when he reported missing it, but felt well, and even better when not taking the medication. Denies constipation. Pt's wife reports that he is not remembering to take his medication, unless she sets it out for him. She reports that his mind skips to the next things that he needs to do, and often forgets to do somethings.         Patient Active Problem List Diagnosis Code    Essential hypertension I10    Type 2 diabetes mellitus without complication ([facility] G45.3    Morbid obesity ([facility] E66.01    Renal calculus, left N20.0        Current Outpatient Medications on File Prior to Visit   Medication Sig Dispense Refill    lisinopril (PRINIVIL, ZESTRIL) 5 mg tablet Take  by mouth daily.  metFORMIN (GLUCOPHAGE) 500 mg tablet 1 tab bid 60 Tab 0    modafinil (PROVIGIL) 200 mg tablet Take 1 Tab by mouth every morning. Max Daily Amount: 200 mg. Indications: Sleepiness Due To Obstructive Sleep Apnea 30 Tab 0    ALPRAZolam (XANAX) 0.5 mg tablet Take 1 Tab by mouth nightly as needed for Anxiety for up to 5 doses. Max Daily Amount: 0.5 mg. 5 Tab 0    metFORMIN (GLUCOPHAGE) 500 mg tablet TAKE 1 TABLET BY MOUTH TWICE A DAY 30 Tab 0    VIT B COMP-C-FA-IRON-VIT E PO Take  by mouth. No current facility-administered medications on file prior to visit.         Allergies   Allergen Reactions    Wellbutrin [Bupropion Hcl] Unknown (comments)       Past Medical History:   Diagnosis Date    Diabetes ([facility]     Hypertension        Past Surgical History:   Procedure Laterality Date    HX TONSILLECTOMY  200       Family History   Problem Relation Age of Onset    Hypertension Mother     Headache Mother     Headache Sister     Diabetes Maternal Grandfather        Social History     Socioeconomic History    Marital status:      Spouse name: Not on file    Number of children: Not on file    Years of education: Not on file    Highest education level: Not on file   Occupational History    Not on file   Social Needs    Financial resource strain: Not on file    Food insecurity:     Worry: Not on file     Inability: Not on file    Transportation needs:     Medical: Not on file     Non-medical: Not on file   Tobacco Use    Smoking status: Never Smoker    Smokeless tobacco: Never Used   Substance and Sexual Activity    Alcohol use: No    Drug use: No    Sexual activity: Yes     Partners: Female   Lifestyle    Physical activity:     Days per week: Not on file     Minutes per session: Not on file    Stress: Not on file   Relationships    Social connections:     Talks on phone: Not on file     Gets together: Not on file     Attends Gnosticist service: Not on file     Active member of club or organization: Not on file     Attends meetings of clubs or organizations: Not on file     Relationship status: Not on file    Intimate partner violence:     Fear of current or ex partner: Not on file     Emotionally abused: Not on file     Physically abused: Not on file     Forced sexual activity: Not on file   Other Topics Concern    Not on file   Social History Narrative    Not on file         Review of Systems   Constitutional: Positive for malaise/fatigue. HENT: Negative for congestion. Eyes: Positive for blurred vision. Negative for pain. Respiratory: Negative for cough and shortness of breath. Cardiovascular: Positive for chest pain (with anxiety). Negative for palpitations. Gastrointestinal: Negative for abdominal pain, constipation and heartburn. Genitourinary: Negative for frequency and urgency. Musculoskeletal: Negative for joint pain and myalgias. Skin:        +L Toe pain   Neurological: Positive for tingling and sensory change (burning / freezing). Negative for dizziness, weakness and headaches. Psychiatric/Behavioral: Negative for depression, memory loss and substance abuse. The patient is nervous/anxious. Visit Vitals  /81 (BP 1 Location: Left arm, BP Patient Position: Sitting)   Pulse 67   Temp 98.1 °F (36.7 °C) (Oral)   Resp 18   Ht 6' 1\" (1.854 m)   Wt (!) 359 lb 9.6 oz (163.1 kg)   SpO2 96%   BMI 47.44 kg/m²       Physical Exam   Constitutional: He is oriented to person, place, and time. He appears well-developed. No distress.    Morbidly obese   HENT:   Right Ear: External ear normal.   Left Ear: External ear normal.   Eyes: Conjunctivae and EOM are normal. Right eye exhibits no discharge. Left eye exhibits no discharge. Neck: Normal range of motion. Neck supple. Cardiovascular: Normal rate, regular rhythm and normal heart sounds. Pulmonary/Chest: Effort normal and breath sounds normal. He has no wheezes. Abdominal: Soft. Bowel sounds are normal. There is no tenderness. Lymphadenopathy:     He has no cervical adenopathy. Neurological: He is alert and oriented to person, place, and time. Skin: He is not diaphoretic.   +L big toe hematoma with edema, and erythema, tender to palpation of scab   Psychiatric: He has a normal mood and affect. His behavior is normal.   Nursing note and vitals reviewed. ASSESSMENT and PLAN    ICD-10-CM ICD-9-CM    1. Type 2 diabetes mellitus without complication, without long-term current use of insulin (HCC) E11.9 250.00 AMB POC HEMOGLOBIN A1C      AMB POC URINE, MICROALBUMIN, SEMIQUANT (3 RESULTS)      METABOLIC PANEL, COMPREHENSIVE      CBC W/O DIFF      HEMOGLOBIN A1C WITH EAG      TSH 3RD GENERATION      LIPID PANEL        HgA1C, CMP, CBC, TSH and Lipid panel ordered. POC microalbumin tested in office. 2. Generalized anxiety disorder F41.1 300.02 AMB POC URINE, MICROALBUMIN, SEMIQUANT (3 RESULTS)    POC microalbumin tested in office. 3. Essential hypertension I10 401.9 BP is well-controlled on current medication. No change to dosage at this time. 4. Morbid obesity ([address] E66.01 278.01 Encouraged diet and exercise. Advised pt to purchase a FitBit or similar device. Discussed that a healthy lifestyle requires 5,000-7,000 steps daily, but weight loss requires 10,000 steps daily. 5. Blurred vision, bilateral H53.8 368.8 Followed by ophthalmology. Recommended making a follow up appointment soon. 6. Cellulitis of great toe of left foot L03.032 681.10 ciprofloxacin HCl (CIPRO) 500 mg tablet sent to pharmacy. Cipro 500 mg prescribed. Pt should take 1 tab po BID x 10 days.    7. Numbness and tingling of foot R20.0 782.0 VITAMIN B12    Vitamin B12 ordered. R20.2     8. Vitamin D deficiency E55.9 268.9 VITAMIN D, 25 HYDROXY    Vitamin D ordered. This plan was reviewed with the patient and patient agrees. All questions were answered. This scribe documentation was reviewed by me and accurately reflects the examination and decisions made by me. This note will not be viewable in [address]

## 2019-08-09 LAB
25(OH)D3+25(OH)D2 SERPL-MCNC: 24.9 NG/ML (ref 30–100)
ALBUMIN SERPL-MCNC: 4.5 G/DL (ref 3.5–5.5)
ALBUMIN UR QL STRIP: 30 MG/L
ALBUMIN/GLOB SERPL: 1.7 {RATIO} (ref 1.2–2.2)
ALP SERPL-CCNC: 56 IU/L (ref 39–117)
ALT SERPL-CCNC: 37 IU/L (ref 0–44)
AST SERPL-CCNC: 26 IU/L (ref 0–40)
BILIRUB SERPL-MCNC: 0.4 MG/DL (ref 0–1.2)
BUN SERPL-MCNC: 11 MG/DL (ref 6–24)
BUN/CREAT SERPL: 14 (ref 9–20)
CALCIUM SERPL-MCNC: 9.3 MG/DL (ref 8.7–10.2)
CHLORIDE SERPL-SCNC: 101 MMOL/L (ref 96–106)
CHOLEST SERPL-MCNC: 173 MG/DL (ref 100–199)
CO2 SERPL-SCNC: 20 MMOL/L (ref 20–29)
CREAT SERPL-MCNC: 0.79 MG/DL (ref 0.76–1.27)
CREATININE, URINE POC: 300 MG/DL
ERYTHROCYTE [DISTWIDTH] IN BLOOD BY AUTOMATED COUNT: 15 % (ref 12.3–15.4)
EST. AVERAGE GLUCOSE BLD GHB EST-MCNC: 200 MG/DL
GLOBULIN SER CALC-MCNC: 2.7 G/DL (ref 1.5–4.5)
GLUCOSE SERPL-MCNC: 109 MG/DL (ref 65–99)
HBA1C MFR BLD: 8.6 % (ref 4.8–5.6)
HCT VFR BLD AUTO: 42.1 % (ref 37.5–51)
HDLC SERPL-MCNC: 38 MG/DL
HGB BLD-MCNC: 13.8 G/DL (ref 13–17.7)
LDLC SERPL CALC-MCNC: 120 MG/DL (ref 0–99)
MCH RBC QN AUTO: 26.5 PG (ref 26.6–33)
MCHC RBC AUTO-ENTMCNC: 32.8 G/DL (ref 31.5–35.7)
MCV RBC AUTO: 81 FL (ref 79–97)
MICROALBUMIN/CREAT RATIO POC: <30 MG/G
PLATELET # BLD AUTO: 332 X10E3/UL (ref 150–450)
POTASSIUM SERPL-SCNC: 4.2 MMOL/L (ref 3.5–5.2)
PROT SERPL-MCNC: 7.2 G/DL (ref 6–8.5)
RBC # BLD AUTO: 5.2 X10E6/UL (ref 4.14–5.8)
SODIUM SERPL-SCNC: 138 MMOL/L (ref 134–144)
TRIGL SERPL-MCNC: 76 MG/DL (ref 0–149)
TSH SERPL DL<=0.005 MIU/L-ACNC: 2 UIU/ML (ref 0.45–4.5)
VIT B12 SERPL-MCNC: 581 PG/ML (ref 232–1245)
VLDLC SERPL CALC-MCNC: 15 MG/DL (ref 5–40)
WBC # BLD AUTO: 7.6 X10E3/UL (ref 3.4–10.8)

## 2019-08-12 NOTE — PROGRESS NOTES
Wade Grace, your diabetes numbers came back way too reymundo. I don`t think Metformin alone is going to be enough for you. Please make an appointment so we can discuss medications adjustment.

## 2019-08-16 ENCOUNTER — OFFICE VISIT (OUTPATIENT)
Dept: PRIMARY CARE CLINIC | Age: 44
End: 2019-08-16

## 2019-08-16 VITALS
WEIGHT: 315 LBS | SYSTOLIC BLOOD PRESSURE: 128 MMHG | RESPIRATION RATE: 18 BRPM | HEART RATE: 69 BPM | HEIGHT: 73 IN | TEMPERATURE: 98.3 F | BODY MASS INDEX: 41.75 KG/M2 | DIASTOLIC BLOOD PRESSURE: 79 MMHG | OXYGEN SATURATION: 97 %

## 2019-08-16 DIAGNOSIS — E11.9 TYPE 2 DIABETES MELLITUS WITHOUT COMPLICATION, WITHOUT LONG-TERM CURRENT USE OF INSULIN (HCC): Primary | ICD-10-CM

## 2019-08-16 DIAGNOSIS — E78.2 MIXED HYPERLIPIDEMIA: ICD-10-CM

## 2019-08-16 DIAGNOSIS — E66.01 MORBID OBESITY (HCC): ICD-10-CM

## 2019-08-16 RX ORDER — PRAVASTATIN SODIUM 10 MG/1
10 TABLET ORAL
Qty: 30 TAB | Refills: 2 | Status: SHIPPED | OUTPATIENT
Start: 2019-08-16 | End: 2019-10-30 | Stop reason: SDUPTHER

## 2019-08-16 RX ORDER — METFORMIN HYDROCHLORIDE 850 MG/1
850 TABLET ORAL 2 TIMES DAILY WITH MEALS
Qty: 60 TAB | Refills: 2 | Status: SHIPPED | OUTPATIENT
Start: 2019-08-16 | End: 2019-10-30 | Stop reason: SDUPTHER

## 2019-08-16 NOTE — PROGRESS NOTES
Written by Brandon Snow, as dictated by Kingsley Núñez MD.    History of Present Illness    Jesus Leon is a 38 y.o. male who presents today for follow up of routine medical issues. DM: Pt is compliant in taking metformin 500mg BID, however he notes that he was not taking this consistently until about one month ago. Patient denies numbness/tingling in extremities, fatigue, dizziness, polydipsia, polyuria, polyphagia, increased sugar consumption, sore/bleeding gums, blurred vision, or cuts that will not heal. Last HgA1c was 8.6 on 08/08/2019. Pt reports that his fasting BG at home is typically 80s-90s, but has had readings as high as 150s 2 hours after a meal. Pt reports that he has started participating in a 1 year wellness program which includes diet and exercise. Dyslipidemia: Last lipid panel on 08/08/2019 was notable for total cholesterol 173, HDL 38, , and triglycerides 76. Pt has not had dx or treatment for HLD in the past.     Left great toe cellulitis: Pt was seen on 08/08/2019 for pain in the left great toe. He was dx with cellulitis, and prescribed cipro, which he has been taking for one week. He reports improvement of sx. Patient Active Problem List   Diagnosis Code    Essential hypertension I10    Type 2 diabetes mellitus without complication ([facility] H40.6    Morbid obesity ([facility] E66.01    Renal calculus, left N20.0        Current Outpatient Medications on File Prior to Visit   Medication Sig Dispense Refill    lisinopril (PRINIVIL, ZESTRIL) 5 mg tablet Take  by mouth daily.  ciprofloxacin HCl (CIPRO) 500 mg tablet Take 1 Tab by mouth two (2) times a day for 10 days. 20 Tab 0    modafinil (PROVIGIL) 200 mg tablet Take 1 Tab by mouth every morning. Max Daily Amount: 200 mg. Indications: Sleepiness Due To Obstructive Sleep Apnea 30 Tab 0    ALPRAZolam (XANAX) 0.5 mg tablet Take 1 Tab by mouth nightly as needed for Anxiety for up to 5 doses.  Max Daily Amount: 0.5 mg. 5 Tab 0    VIT B COMP-C-FA-IRON-VIT E PO Take  by mouth. No current facility-administered medications on file prior to visit.         Allergies   Allergen Reactions    Wellbutrin [Bupropion Hcl] Unknown (comments)       Past Medical History:   Diagnosis Date    Diabetes ([facility]     Hypertension        Past Surgical History:   Procedure Laterality Date    [facility]IL[facility]OMY  [address] [facility]       Family History   Problem Relation Age of Onset    Hypertension Mother     Headache Mother     Headache Sister     Diabetes Maternal Grandfather        Social History     Socioeconomic History    Marital status:      Spouse name: Not on file    Number of children: Not on file    Years of education: Not on file    Highest education level: Not on file   Occupational History    Not on file   Social Needs    Financial resource strain: Not on file    Food insecurity:     Worry: Not on file     Inability: Not on file    Transportation needs:     Medical: Not on file     Non-medical: Not on file   Tobacco Use    Smoking status: Never Smoker    Smokeless tobacco: Never Used   Substance and Sexual Activity    Alcohol use: No    Drug use: No    Sexual activity: Yes     Partners: Female   Lifestyle    Physical activity:     Days per week: Not on file     Minutes per session: Not on file    Stress: Not on file   Relationships    Social connections:     Talks on phone: Not on file     Gets together: Not on file     Attends [employer]ist service: Not on file     Active member of club or organization: Not on file     Attends meetings of clubs or organizations: Not on file     Relationship status: Not on file    Intimate partner violence:     Fear of current or ex partner: Not on file     Emotionally abused: Not on file     Physically abused: Not on file     Forced sexual activity: Not on file   Other Topics Concern    Not on file   Social History Narrative    Not on file       Office Visit on 08/08/2019   Component Date Value Ref Range Status    ALBUMIN, URINE POC 08/08/2019 30  Negative mg/L Final    CREATININE, URINE POC 08/08/2019 300  mg/dL Final    Microalbumin/creat ratio (POC) 08/08/2019 <30  <30 MG/G Final    Glucose 08/08/2019 109* 65 - 99 mg/dL Final    BUN 08/08/2019 11  6 - 24 mg/dL Final    Creatinine 08/08/2019 0.79  0.76 - 1.27 mg/dL Final    GFR est non-AA 08/08/2019 108  >59 mL/min/1.73 Final    GFR est AA 08/08/2019 125  >59 mL/min/1.73 Final    BUN/Creatinine ratio 08/08/2019 14  9 - 20 Final    Sodium 08/08/2019 138  134 - 144 mmol/L Final    Potassium 08/08/2019 4.2  3.5 - 5.2 mmol/L Final    Chloride 08/08/2019 101  96 - 106 mmol/L Final    CO2 08/08/2019 20  20 - 29 mmol/L Final    Calcium 08/08/2019 9.3  8.7 - 10.2 mg/dL Final    Protein, total 08/08/2019 7.2  6.0 - 8.5 g/dL Final    Albumin 08/08/2019 4.5  3.5 - 5.5 g/dL Final    GLOBULIN, TOTAL 08/08/2019 2.7  1.5 - 4.5 g/dL Final    A-G Ratio 08/08/2019 1.7  1.2 - 2.2 Final    Bilirubin, total 08/08/2019 0.4  0.0 - 1.2 mg/dL Final    Alk. phosphatase 08/08/2019 56  39 - 117 IU/L Final    AST (SGOT) 08/08/2019 26  0 - 40 IU/L Final    ALT (SGPT) 08/08/2019 37  0 - 44 IU/L Final    WBC 08/08/2019 7.6  3.4 - 10.8 x10E3/uL Final    RBC 08/08/2019 5.20  4. 14 - 5.80 x10E6/uL Final    HGB 08/08/2019 13.8  13.0 - 17.7 g/dL Final    HCT 08/08/2019 42.1  37.5 - 51.0 % Final    MCV 08/08/2019 81  79 - 97 fL Final    MCH 08/08/2019 26.5* 26.6 - 33.0 pg Final    MCHC 08/08/2019 32.8  31.5 - 35.7 g/dL Final    RDW 08/08/2019 15.0  12.3 - 15.4 % Final    PLATELET 32/08/9354 124  150 - 450 x10E3/uL Final    Hemoglobin A1c 08/08/2019 8.6* 4.8 - 5.6 % Final    Comment:          Prediabetes: 5.7 - 6.4           Diabetes: >6.4           Glycemic control for adults with diabetes: <7.0      Estimated average glucose 08/08/2019 200  mg/dL Final    TSH 08/08/2019 2.000  0.450 - 4.500 uIU/mL Final    Cholesterol, total 08/08/2019 173  100 - 199 mg/dL Final    Triglyceride 08/08/2019 76  0 - 149 mg/dL Final    HDL Cholesterol 08/08/2019 38* >39 mg/dL Final    VLDL, calculated 08/08/2019 15  5 - 40 mg/dL Final    LDL, calculated 08/08/2019 120* 0 - 99 mg/dL Final    Vitamin B12 08/08/2019 581  232 - 1,245 pg/mL Final    VITAMIN D, 25-HYDROXY 08/08/2019 24.9* 30.0 - 100.0 ng/mL Final    Comment: Vitamin D deficiency has been defined by the [address] [place] [address] practice guideline as a  level of serum 25-OH vitamin D less than 20 ng/mL (1,2). The Endocrine Society went on to further define vitamin D  insufficiency as a level between 21 and 29 ng/mL (2). 1. IOM ([place] of [employer]). 2010. Dietary reference     intakes for calcium and D. [address] [place] [address]: [employer]. 2. Griselda MF, Carlene NC, Dread VAZQUEZ, et al.     Evaluation, treatment, and prevention of vitamin D     deficiency: an Endocrine Society clinical practice     guideline. JCEM. 2011 Jul; 96(7):1911-30. Review of Systems   Constitutional: Negative for malaise/fatigue. HENT: Negative for congestion. Eyes: Negative for blurred vision and pain. Respiratory: Negative for cough and shortness of breath. Cardiovascular: Negative for chest pain and palpitations. Gastrointestinal: Negative for abdominal pain and heartburn. Genitourinary: Negative for frequency and urgency. Musculoskeletal: Positive for joint pain. Negative for myalgias (L great toe pain). Neurological: Negative for dizziness, tingling, sensory change, weakness and headaches. Psychiatric/Behavioral: Negative for depression, memory loss and substance abuse. Physical Exam   Constitutional: He is oriented to person, place, and time and well-developed, well-nourished, and in no distress. HENT:   Right Ear: External ear normal.   Left Ear: External ear normal.   Eyes: Conjunctivae and EOM are normal.   Neck: Normal range of motion.  Neck supple. Cardiovascular: Normal rate and regular rhythm. Pulmonary/Chest: Effort normal and breath sounds normal.   Abdominal: Soft. Bowel sounds are normal.   Lymphadenopathy:     He has no cervical adenopathy. Neurological: He is alert and oriented to person, place, and time. Skin: Skin is warm and dry. Psychiatric: Mood and affect normal.   Diabetic foot exam:     Left: Sharp/dull discrimination diminished    Pulse DP: 2+ (normal)   Deformities: None  Right: Sharp/dull discrimination diminished   Pulse DP: 2+ (normal)   Deformities: None      Visit Vitals  /79 (BP 1 Location: Left arm, BP Patient Position: Sitting)   Pulse 69   Temp 98.3 °F (36.8 °C) (Oral)   Resp 18   Ht 6' 1\" (1.854 m)   Wt (!) 354 lb 9.6 oz (160.8 kg)   SpO2 97%   BMI 46.78 kg/m²       Diagnoses and all orders for this visit:    1. Type 2 diabetes mellitus without complication, without long-term current use of insulin (HCC)  -     metFORMIN dose increased (GLUCOPHAGE) 850 mg tablet; Take 1 Tab by mouth two (2) times daily (with meals) for 90 days. Increased metformin from 500mg BID to 850mg BID. Advised pt to continue to check his BG. I advised pt to continue to work on his lifestyle changes, avoid sugars and starches, and to increase exercise when possible. F/u in 4 months. 2. Mixed hyperlipidemia  In presence of DM, will start pt on pravastatin 10mg. Potential side effects discussed. F/u in 4 months. 3. Morbid obesity ([address]  I advised pt to continue to work on his lifestyle changes, avoid sugars and starches, and to increase exercise when possible. F/u in 4 months. This plan was reviewed with the patient and patient agrees. All questions were answered. This scribe documentation was reviewed by me and accurately reflects the examination and decisions made by me. This note will not be viewable in [address]

## 2019-08-16 NOTE — PROGRESS NOTES
Visit Vitals  /79 (BP 1 Location: Left arm, BP Patient Position: Sitting)   Pulse 69   Temp 98.3 °F (36.8 °C) (Oral)   Resp 18   Ht 6' 1\" (1.854 m)   Wt (!) 354 lb 9.6 oz (160.8 kg)   SpO2 97%   BMI 46.78 kg/m²               Chief Complaint   Patient presents with    Follow Up Chronic Condition     Diabetes    Labs     Lab Review            HM Due Reviewed/WNL. Patient is not interested in [address] [place] [address] at this time. 1. Have you been to the ER, urgent care clinic since your last visit? Hospitalized since your last visit? Denies     2. Have you seen or consulted any other health care providers outside of the [address] [place] [address] since your last visit? Include any pap smears or colon screening.  Denies

## 2019-08-21 ENCOUNTER — OFFICE VISIT (OUTPATIENT)
Dept: PRIMARY CARE CLINIC | Age: 44
End: 2019-08-21

## 2019-08-21 VITALS
OXYGEN SATURATION: 97 % | TEMPERATURE: 98.7 F | HEIGHT: 73 IN | BODY MASS INDEX: 41.75 KG/M2 | WEIGHT: 315 LBS | DIASTOLIC BLOOD PRESSURE: 83 MMHG | HEART RATE: 86 BPM | SYSTOLIC BLOOD PRESSURE: 122 MMHG | RESPIRATION RATE: 16 BRPM

## 2019-08-21 DIAGNOSIS — R21 RASH IN ADULT: ICD-10-CM
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
## 2019-08-21 RX ORDER — HYDROCORTISONE 25 MG/G
OINTMENT TOPICAL 2 TIMES DAILY
Qty: 30 G | Refills: 0 | Status: SHIPPED | OUTPATIENT
Start: 2019-08-21 | End: 2019-12-03 | Stop reason: ALTCHOICE

## 2019-08-21 RX ORDER — DOXYCYCLINE 100 MG/1
100 TABLET ORAL 2 TIMES DAILY
Qty: 20 TAB | Refills: 0 | Status: SHIPPED | OUTPATIENT
Start: 2019-08-21 | End: 2019-08-31

## 2019-08-21 NOTE — PROGRESS NOTES
[facility]   [address], [address] [facility]  P: [phone]  F: [phone]      Chief Complaint   Patient presents with    Skin Problem     patient states that he thought it was chiggers but this has been going on for about 6 weeks, it did come and gone now it is back and worse. Meseret Guy is a 38 y.o. male who presents to clinic for Skin Problem (patient states that he thought it was chiggers but this has been going on for about 6 weeks, it did come and gone now it is back and worse. ). HPI:    Melyssa Lutz is a 25-year-old male who presents today for 5-6 weeks of itchy rash to bilateral feet, that he suspects is chiggers. He has been working outside, but did have thick socks on and work boots. He also states his wife had to remove about 20+ ticks that were on him for about 12 to 24 hours. He denies any areas with redness and central clearing pattern consistent with erythema migrans. He does state today that he was put on Cipro by Dr. Winnie Mcdaniel recently for infected left great toe, which has improved significantly since finishing the antibiotic. He denies fevers, joint pains, or other areas with rash.     Patient Active Problem List    Diagnosis    Renal calculus, left    Essential hypertension    Type 2 diabetes mellitus without complication (HCC)    Morbid obesity (HCC)          Past Medical History:   Diagnosis Date    Diabetes ([address]     Hypertension      Past Surgical History:   Procedure Laterality Date    HX TONSILLECTOMY  1990     Social History     Socioeconomic History    Marital status:      Spouse name: Not on file    Number of children: Not on file    Years of education: Not on file    Highest education level: Not on file   Occupational History    Not on file   Social Needs    Financial resource strain: Not on file    Food insecurity:     Worry: Not on file     Inability: Not on file    Transportation needs:     Medical: Not on file     Non-medical: Not on file   Tobacco Use    Smoking status: Never Smoker    Smokeless tobacco: Never Used   Substance and Sexual Activity    Alcohol use: No    Drug use: No    Sexual activity: Yes     Partners: Female   Lifestyle    Physical activity:     Days per week: Not on file     Minutes per session: Not on file    Stress: Not on file   Relationships    Social connections:     Talks on phone: Not on file     Gets together: Not on file     Attends Rastafari service: Not on file     Active member of club or organization: Not on file     Attends meetings of clubs or organizations: Not on file     Relationship status: Not on file    Intimate partner violence:     Fear of current or ex partner: Not on file     Emotionally abused: Not on file     Physically abused: Not on file     Forced sexual activity: Not on file   Other Topics Concern    Not on file   Social History Narrative    Not on file     Family History   Problem Relation Age of Onset    Hypertension Mother     Headache Mother     Headache Sister     Diabetes Maternal Grandfather      Allergies   Allergen Reactions    Wellbutrin [Bupropion Hcl] Unknown (comments)       Current Outpatient Medications   Medication Sig Dispense Refill    doxycycline (ADOXA) 100 mg tablet Take 1 Tab by mouth two (2) times a day for 10 days. 20 Tab 0    hydrocortisone (HYTONE) 2.5 % ointment Apply  to affected area two (2) times a day. Apply to both feet twice a day for 1 week. 30 g 0    metFORMIN (GLUCOPHAGE) 850 mg tablet Take 1 Tab by mouth two (2) times daily (with meals) for 90 days. 60 Tab 2    pravastatin (PRAVACHOL) 10 mg tablet Take 1 Tab by mouth nightly for 90 days. 30 Tab 2    lisinopril (PRINIVIL, ZESTRIL) 5 mg tablet Take  by mouth daily.  modafinil (PROVIGIL) 200 mg tablet Take 1 Tab by mouth every morning. Max Daily Amount: 200 mg.  Indications: Sleepiness Due To Obstructive Sleep Apnea 30 Tab 0    ALPRAZolam (XANAX) 0.5 mg tablet Take 1 Tab by mouth nightly as needed for Anxiety for up to 5 doses. Max Daily Amount: 0.5 mg. 5 Tab 0    VIT B COMP-C-FA-IRON-VIT E PO Take  by mouth. The medications were reviewed and updated in the medical record. The past medical history, past surgical history, and family history were reviewed and updated in the medical record. REVIEW OF SYSTEMS   Review of Systems   Constitutional: Negative for malaise/fatigue. HENT: Negative for congestion. Eyes: Negative for blurred vision and pain. Respiratory: Negative for cough and shortness of breath. Cardiovascular: Negative for chest pain and palpitations. Gastrointestinal: Negative for abdominal pain and heartburn. Genitourinary: Negative for frequency and urgency. Musculoskeletal: Negative for joint pain and myalgias. Skin: Positive for itching and rash. Neurological: Negative for dizziness, tingling, sensory change, weakness and headaches. Psychiatric/Behavioral: Negative for depression, memory loss and substance abuse. PHYSICAL EXAM     Visit Vitals  /83 (BP 1 Location: Left arm, BP Patient Position: Sitting)   Pulse 86   Temp 98.7 °F (37.1 °C) (Oral)   Resp 16   Ht 6' 1\" (1.854 m)   Wt (!) 351 lb 9.6 oz (159.5 kg)   SpO2 97%   BMI 46.39 kg/m²       Physical Exam   Constitutional: He is oriented to person, place, and time and well-developed, well-nourished, and in no distress. HENT:   Head: Normocephalic and atraumatic. Right Ear: External ear normal.   Left Ear: External ear normal.   Cardiovascular: Normal rate, regular rhythm and normal heart sounds. Pulmonary/Chest: Effort normal and breath sounds normal.   Musculoskeletal: Normal range of motion. He exhibits no edema. Several scabs to right shoulder and lower abdomen, that are intact. Patient states this is where multiple ticks were removed. Neurological: He is alert and oriented to person, place, and time.  Gait normal.   Skin: Skin is warm, dry and intact. Rash noted. Rash is maculopapular. Rash to bilateral feet   Psychiatric: Affect and judgment normal.   Nursing note and vitals reviewed. ASSESSMENT/ PLAN   Diagnoses and all orders for this visit:    1. Tick bite, initial encounter  -     doxycycline (ADOXA) 100 mg tablet; Take 1 Tab by mouth two (2) times a day for 10 days. 2. Rash in adult  -     hydrocortisone (HYTONE) 2.5 % ointment; Apply  to affected area two (2) times a day. Apply to both feet twice a day for 1 week. Encouraged an antihistamine during the day such as Claritin or Zyrtec, and he can take Benadryl at night for itching. Try the topical steroid cream and covering with Doxy for 20+ tick bites. Disclaimer:  Advised patient to call back or return to office if symptoms worsen/change/persist.  Discussed expected course/resolution/complications of diagnosis in detail with patient.     Medication risks/benefits/alternatives discussed with patient. Patient was given an after visit summary which includes diagnoses, current medications, & vitals.      Discussed patient instructions and advised to read to all patient instructions regarding care.      Patient expressed understanding with the diagnosis and plan. This note will not be viewable in [address]         Enrique Banegas NP  8/21/2019        (This document has been electronically signed)

## 2019-08-21 NOTE — PROGRESS NOTES
Chief Complaint   Patient presents with    Skin Problem     patient states that he thought it was chiggers but this has been going on for about 6 weeks, it did come and gone now it is back and worse.

## 2019-10-30 DIAGNOSIS — E11.9 TYPE 2 DIABETES MELLITUS WITHOUT COMPLICATION, WITHOUT LONG-TERM CURRENT USE OF INSULIN (HCC): ICD-10-CM

## 2019-10-30 DIAGNOSIS — E78.2 MIXED HYPERLIPIDEMIA: ICD-10-CM

## 2019-10-30 RX ORDER — METFORMIN HYDROCHLORIDE 850 MG/1
TABLET ORAL
Qty: 180 TAB | Refills: 0 | Status: SHIPPED | OUTPATIENT
Start: 2019-10-30 | End: 2020-01-23

## 2019-10-30 RX ORDER — PRAVASTATIN SODIUM 10 MG/1
TABLET ORAL
Qty: 90 TAB | Refills: 0 | Status: SHIPPED | OUTPATIENT
Start: 2019-10-30 | End: 2020-01-23

## 2019-11-07 DIAGNOSIS — I10 HYPERTENSION, UNSPECIFIED TYPE: ICD-10-CM

## 2019-11-07 RX ORDER — LISINOPRIL 5 MG/1
TABLET ORAL
Qty: 90 TAB | Refills: 1 | Status: SHIPPED | OUTPATIENT
Start: 2019-11-07 | End: 2020-05-14 | Stop reason: SDUPTHER

## 2019-12-03 ENCOUNTER — OFFICE VISIT (OUTPATIENT)
Dept: PRIMARY CARE CLINIC | Age: 44
End: 2019-12-03

## 2019-12-03 VITALS
OXYGEN SATURATION: 96 % | SYSTOLIC BLOOD PRESSURE: 117 MMHG | BODY MASS INDEX: 41.75 KG/M2 | TEMPERATURE: 98.3 F | RESPIRATION RATE: 17 BRPM | DIASTOLIC BLOOD PRESSURE: 78 MMHG | WEIGHT: 315 LBS | HEART RATE: 81 BPM | HEIGHT: 73 IN

## 2019-12-03 DIAGNOSIS — E66.01 MORBID OBESITY (HCC): ICD-10-CM

## 2019-12-03 DIAGNOSIS — E11.9 TYPE 2 DIABETES MELLITUS WITHOUT COMPLICATION, WITHOUT LONG-TERM CURRENT USE OF INSULIN (HCC): Primary | ICD-10-CM

## 2019-12-03 DIAGNOSIS — I10 ESSENTIAL HYPERTENSION: ICD-10-CM

## 2019-12-03 DIAGNOSIS — Z23 ENCOUNTER FOR IMMUNIZATION: ICD-10-CM

## 2019-12-03 DIAGNOSIS — F33.8 SEASONAL AFFECTIVE DISORDER (HCC): ICD-10-CM

## 2019-12-03 RX ORDER — PHENTERMINE HYDROCHLORIDE 37.5 MG/1
37.5 TABLET ORAL
Qty: 30 TAB | Refills: 0 | Status: SHIPPED | OUTPATIENT
Start: 2019-12-03 | End: 2020-01-02

## 2019-12-03 NOTE — PROGRESS NOTES
Written by Blaise Avalos, as dictated by Dr. Priti Ureña MD.    Lewis Alvarado is a 38 y.o. male. HPI  The patient presents today for follow-up on DM-2. He is taking Metformin 850 mg BID consistently this past week, and noticed a difference while on Metformin, specifically a decrease in numbness and tingling in his LE. He notes having some discomfort while on Metformin initially, but not after. His BS ranges between 130-166, and was 133 this morning. When he had lost weight, his BS ranged between . He had previously lost weight, but then went through depression and stress, and regained the weight. He is going to a counselor to help with his depression, and does not want to start medication at this time. He admits having difficulty with his cravings, and previously tried phentermine, which worked well, but he does not want to stay on it long term. He is willing to try phentermine again. He is not taking Provigil 200 mg. He was previously sent for a sleep study, but the equipment was too expensive at that time. He is interested in getting the flu shot today. Patient Active Problem List   Diagnosis Code    Essential hypertension I10    Type 2 diabetes mellitus without complication ([facility] [address] B05.1    Morbid obesity ([facility] [address] E66.01    Renal calculus, left N20.0        Current Outpatient Medications on File Prior to Visit   Medication Sig Dispense Refill    lisinopril (PRINIVIL, ZESTRIL) 5 mg tablet TAKE 1 TABLET BY MOUTH EVERY DAY 90 Tab 1    pravastatin (PRAVACHOL) 10 mg tablet TAKE 1 TABLET BY MOUTH NIGHTLY 90 Tab 0    metFORMIN (GLUCOPHAGE) 850 mg tablet TAKE 1 TABLET BY MOUTH TWO (2) TIMES DAILY (WITH MEALS) FOR 90 DAYS. 180 Tab 0    [DISCONTINUED] hydrocortisone (HYTONE) 2.5 % ointment Apply  to affected area two (2) times a day. Apply to both feet twice a day for 1 week.  30 g 0    [DISCONTINUED] lisinopril (PRINIVIL, ZESTRIL) 5 mg tablet Take by mouth daily.  [DISCONTINUED] modafinil (PROVIGIL) 200 mg tablet Take 1 Tab by mouth every morning. Max Daily Amount: 200 mg. Indications: Sleepiness Due To Obstructive Sleep Apnea 30 Tab 0    ALPRAZolam (XANAX) 0.5 mg tablet Take 1 Tab by mouth nightly as needed for Anxiety for up to 5 doses. Max Daily Amount: 0.5 mg. 5 Tab 0    [DISCONTINUED] VIT B COMP-C-FA-IRON-VIT E PO Take  by mouth. No current facility-administered medications on file prior to visit.         Allergies   Allergen Reactions    Wellbutrin [Bupropion Hcl] Unknown (comments)       Past Medical History:   Diagnosis Date    Diabetes ([facility] [address]     Hypertension        Past Surgical History:   Procedure Laterality Date    HX TONSILLECTOMY  200       Family History   Problem Relation Age of Onset    Hypertension Mother     Headache Mother     Headache Sister     Diabetes Maternal Grandfather        Social History     Socioeconomic History    Marital status:      Spouse name: Not on file    Number of children: Not on file    Years of education: Not on file    Highest education level: Not on file   Occupational History    Not on file   Social Needs    Financial resource strain: Not on file    Food insecurity:     Worry: Not on file     Inability: Not on file    Transportation needs:     Medical: Not on file     Non-medical: Not on file   Tobacco Use    Smoking status: Never Smoker    Smokeless tobacco: Never Used   Substance and Sexual Activity    Alcohol use: No    Drug use: No    Sexual activity: Yes     Partners: Female   Lifestyle    Physical activity:     Days per week: Not on file     Minutes per session: Not on file    Stress: Not on file   Relationships    Social connections:     Talks on phone: Not on file     Gets together: Not on file     Attends Congregation service: Not on file     Active member of club or organization: Not on file     Attends meetings of clubs or organizations: Not on file Relationship status: Not on file    Intimate partner violence:     Fear of current or ex partner: Not on file     Emotionally abused: Not on file     Physically abused: Not on file     Forced sexual activity: Not on file   Other Topics Concern    Not on file   Social History Narrative    Not on file       Review of Systems   Constitutional: Negative for malaise/fatigue and weight loss. HENT: Negative for congestion and hearing loss. Eyes: Negative for blurred vision and photophobia. Respiratory: Negative for cough and shortness of breath. Cardiovascular: Negative for chest pain and leg swelling. Gastrointestinal: Negative for constipation, diarrhea and heartburn. Genitourinary: Negative for dysuria, frequency and urgency. Musculoskeletal: Negative for joint pain and myalgias. Neurological: Negative for dizziness and headaches. Psychiatric/Behavioral: Negative for depression and substance abuse. The patient is not nervous/anxious and does not have insomnia. Visit Vitals  /78 (BP 1 Location: Right arm, BP Patient Position: Sitting)   Pulse 81   Temp 98.3 °F (36.8 °C) (Oral)   Resp 17   Ht 6' 1\" (1.854 m)   Wt (!) 356 lb (161.5 kg)   SpO2 96%   BMI 46.97 kg/m²       Physical Exam  Vitals signs and nursing note reviewed. Constitutional:       General: He is not in acute distress. Appearance: Normal appearance. He is well-developed and well-groomed. He is morbidly obese. He is not diaphoretic. HENT:      Head: Normocephalic and atraumatic. Right Ear: External ear normal.      Left Ear: External ear normal.   Eyes:      General:         Right eye: No discharge. Left eye: No discharge. Conjunctiva/sclera: Conjunctivae normal.      Pupils: Pupils are equal, round, and reactive to light. Neck:      Musculoskeletal: Normal range of motion and neck supple. Cardiovascular:      Rate and Rhythm: Normal rate and regular rhythm. Heart sounds: Normal heart sounds. No murmur. No friction rub. No gallop. Pulmonary:      Effort: Pulmonary effort is normal.      Breath sounds: Normal breath sounds. No wheezing. Abdominal:      General: Bowel sounds are normal.      Palpations: Abdomen is soft. Tenderness: There is no tenderness. Musculoskeletal: Normal range of motion. Neurological:      Mental Status: He is alert and oriented to person, place, and time. Deep Tendon Reflexes: Reflexes are normal and symmetric. Psychiatric:         Behavior: Behavior normal.         Thought Content: Thought content normal.         ASSESSMENT and PLAN    ICD-10-CM ICD-9-CM    1. Type 2 diabetes mellitus without complication, without long-term current use of insulin ([facility]) E11.9 250.00 Reviewed and decided to continue present medications. 2. Essential hypertension I10 401.9 BP is well-controlled on current medication. No change to dosage at this time. 3. Morbid obesity ([facility] [address] E66.01 278.01 phentermine (ADIPEX-P) 37.5 mg tablet script given to pt. Phentermine 37.5 mg prescribed x 1 month. Potential side effects were discussed. EKG will be done in 1 month. No history of glaucoma. 4. Seasonal affective disorder ([facility] F33.8 296.99 Pt declined medication at this time. 5. Encounter for immunization Z23 V03.89 INFLUENZA VIRUS VAC QUAD,SPLIT,PRESV FREE SYRINGE IM    Influenza vaccine administered in office. This plan was reviewed with the patient and patient agrees. All questions were answered. This scribe documentation was reviewed by me and accurately reflects the examination and decisions made by me. This note will not be viewable in [address]

## 2020-01-06 ENCOUNTER — OFFICE VISIT (OUTPATIENT)
Dept: PRIMARY CARE CLINIC | Age: 45
End: 2020-01-06

## 2020-01-06 VITALS
HEART RATE: 80 BPM | BODY MASS INDEX: 41.75 KG/M2 | DIASTOLIC BLOOD PRESSURE: 86 MMHG | WEIGHT: 315 LBS | HEIGHT: 73 IN | OXYGEN SATURATION: 96 % | RESPIRATION RATE: 20 BRPM | SYSTOLIC BLOOD PRESSURE: 136 MMHG | TEMPERATURE: 98.1 F

## 2020-01-06 DIAGNOSIS — I10 ESSENTIAL HYPERTENSION: ICD-10-CM

## 2020-01-06 DIAGNOSIS — E55.9 VITAMIN D DEFICIENCY: ICD-10-CM

## 2020-01-06 DIAGNOSIS — Z79.899 MEDICATION MANAGEMENT: ICD-10-CM

## 2020-01-06 DIAGNOSIS — E11.9 TYPE 2 DIABETES MELLITUS WITHOUT COMPLICATION, WITHOUT LONG-TERM CURRENT USE OF INSULIN (HCC): Primary | ICD-10-CM

## 2020-01-06 DIAGNOSIS — Z23 NEED FOR DIPHTHERIA-TETANUS-PERTUSSIS (TDAP) VACCINE: ICD-10-CM

## 2020-01-06 DIAGNOSIS — E66.01 MORBID OBESITY (HCC): ICD-10-CM

## 2020-01-06 RX ORDER — PHENTERMINE HYDROCHLORIDE 37.5 MG/1
37.5 TABLET ORAL
Qty: 30 TAB | Refills: 0 | Status: SHIPPED | OUTPATIENT
Start: 2020-01-06 | End: 2020-02-05

## 2020-01-06 NOTE — PROGRESS NOTES
Written by Everardo Rubin, as dictated by Dr. Alex Waddell MD.    Hoa Lloyd is a 38 y.o. male. HPI  The patient presents today for weight management. Patient is fasting for labs. He has lost weight, and is feeling well overall. He weighs 346 lbs today and weighed 356 lbs on 12/03/19. He has been taking Phentermine 37.5 mg as previously prescribed, which he finished on Friday, 01/03/2020. He has been trying to be active, but it has been difficult. He does not have a gym membership as it has been difficult to get on machines. He is trying to walk daily, and walks about 4,000-5,000 steps daily. Denies constipation. BP is high today at 153/93, 136/86 on repeat. He was trying to rush to get to his appointment today. He has not checked his BP at home since his last visit, as it as previously normal. Compliant on Lisinopril 5 mg. He is compliant on Metformin 850 mg BID and Pravastatin 10 mg. Denies side effects. He has not been taking Vitamin D, though he was previously deficient. He has not made an appointment for eye examination in a while, but notes that his vision has worsened. He does not recall the last time he had a Tdap, though believes it might have been 15 years ago. Patient Active Problem List   Diagnosis Code    Essential hypertension I10    Type 2 diabetes mellitus without complication ([facility] [address] S50.1    Morbid obesity ([facility] [address] E66.01    Renal calculus, left N20.0        Current Outpatient Medications on File Prior to Visit   Medication Sig Dispense Refill    lisinopril (PRINIVIL, ZESTRIL) 5 mg tablet TAKE 1 TABLET BY MOUTH EVERY DAY 90 Tab 1    pravastatin (PRAVACHOL) 10 mg tablet TAKE 1 TABLET BY MOUTH NIGHTLY 90 Tab 0    metFORMIN (GLUCOPHAGE) 850 mg tablet TAKE 1 TABLET BY MOUTH TWO (2) TIMES DAILY (WITH MEALS) FOR 90 DAYS. 180 Tab 0    ALPRAZolam (XANAX) 0.5 mg tablet Take 1 Tab by mouth nightly as needed for Anxiety for up to 5 doses. Max Daily Amount: 0.5 mg. 5 Tab 0     No current facility-administered medications on file prior to visit.         Allergies   Allergen Reactions    Wellbutrin [Bupropion Hcl] Unknown (comments)       Past Medical History:   Diagnosis Date    Diabetes ([facility] [address]     Hypertension        Past Surgical History:   Procedure Laterality Date    HX TONSILLECTOMY  200       Family History   Problem Relation Age of Onset    Hypertension Mother     Headache Mother     Headache Sister     Diabetes Maternal Grandfather        Social History     Socioeconomic History    Marital status:      Spouse name: Not on file    Number of children: Not on file    Years of education: Not on file    Highest education level: Not on file   Occupational History    Not on file   Social Needs    Financial resource strain: Not on file    Food insecurity:     Worry: Not on file     Inability: Not on file    Transportation needs:     Medical: Not on file     Non-medical: Not on file   Tobacco Use    Smoking status: Never Smoker    Smokeless tobacco: Never Used   Substance and Sexual Activity    Alcohol use: No    Drug use: No    Sexual activity: Yes     Partners: Female   Lifestyle    Physical activity:     Days per week: Not on file     Minutes per session: Not on file    Stress: Not on file   Relationships    Social connections:     Talks on phone: Not on file     Gets together: Not on file     Attends Church service: Not on file     Active member of club or organization: Not on file     Attends meetings of clubs or organizations: Not on file     Relationship status: Not on file    Intimate partner violence:     Fear of current or ex partner: Not on file     Emotionally abused: Not on file     Physically abused: Not on file     Forced sexual activity: Not on file   Other Topics Concern    Not on file   Social History Narrative    Not on file       Review of Systems   Constitutional: Positive for weight loss (intentional). Negative for malaise/fatigue. HENT: Negative for congestion and hearing loss. Eyes: Negative for blurred vision and photophobia. Respiratory: Negative for cough and shortness of breath. Cardiovascular: Negative for chest pain and leg swelling. Gastrointestinal: Negative for constipation, diarrhea and heartburn. Genitourinary: Negative for dysuria, frequency and urgency. Musculoskeletal: Negative for joint pain and myalgias. Neurological: Negative for dizziness and headaches. Psychiatric/Behavioral: Negative for depression and substance abuse. The patient is not nervous/anxious and does not have insomnia. Visit Vitals  BP (!) 153/93 (BP 1 Location: Left arm, BP Patient Position: Sitting)   Pulse 80   Temp 98.1 °F (36.7 °C) (Oral)   Resp 20   Ht 6' 1\" (1.854 m)   Wt 346 lb 6.4 oz (157.1 kg)   SpO2 96%   BMI 45.70 kg/m²       Physical Exam  Vitals signs and nursing note reviewed. Constitutional:       General: He is not in acute distress. Appearance: Normal appearance. He is well-developed and well-groomed. He is morbidly obese. He is not diaphoretic. HENT:      Head: Normocephalic and atraumatic. Right Ear: External ear normal.      Left Ear: External ear normal.   Eyes:      General:         Right eye: No discharge. Left eye: No discharge. Conjunctiva/sclera: Conjunctivae normal.      Pupils: Pupils are equal, round, and reactive to light. Neck:      Musculoskeletal: Normal range of motion and neck supple. Cardiovascular:      Rate and Rhythm: Normal rate and regular rhythm. Heart sounds: Normal heart sounds. No murmur. No friction rub. No gallop. Pulmonary:      Effort: Pulmonary effort is normal.      Breath sounds: Normal breath sounds. No wheezing. Abdominal:      General: Bowel sounds are normal.      Palpations: Abdomen is soft. Tenderness: There is no tenderness. Musculoskeletal: Normal range of motion.    Neurological: Mental Status: He is alert and oriented to person, place, and time. Deep Tendon Reflexes: Reflexes are normal and symmetric. Psychiatric:         Behavior: Behavior normal.         Thought Content: Thought content normal.         ASSESSMENT and PLAN    ICD-10-CM ICD-9-CM    1. Type 2 diabetes mellitus without complication, without long-term current use of insulin ([facility]) Z40.2 037.38 METABOLIC PANEL, COMPREHENSIVE      LIPID PANEL                            CMP, Lipid Panel, and Hemoglobin A1c ordered. 2. Essential hypertension H24 132.5 METABOLIC PANEL, COMPREHENSIVE      LIPID PANEL      AMB POC EKG ROUTINE W/ 12 LEADS, INTER & REP          CMP, and  Lipid Panel ordered. EKG in office showed sinus rhythm. BP is well-controlled on current medication. No change to dosage at this time. Advised patient to monitor his BP at home, as phentermine can increase his BP. 3. Vitamin D deficiency E55.9 268.9 VITAMIN D, 25 HYDROXY    Vitamin D ordered. Patient is not taking Vitamin D, though he was previously deficient. Patient should take OTC 1,000 iu vitamin D3 daily. Discussed that if he forgets to take it, he can take 2-3 doses at once. 4. Medication management Z79.899 V58.69 AMB POC EKG ROUTINE W/ 12 LEADS, INTER & REP    EKG in office showed sinus rhythm. 5. Morbid obesity ([facility]) T07.44 047.10 METABOLIC PANEL, COMPREHENSIVE      LIPID PANEL                Phentermine 37.5 mg prescribed x 1 month. Potential side effects were discussed. EKG showed Sinus Rhythm on 01/06/2020. No history of glaucoma. 6. Need for diphtheria-tetanus-pertussis (Tdap) vaccine Z23 V06.1 diph,Pertuss,Acell,,Tet Vac-PF (ADACEL) 2 Lf-(2.5-5-3-5 mcg)-5Lf/0.5 mL susp sent to pharmacy. Tdap prescribed. Potential side effects were discussed. This plan was reviewed with the patient and patient agrees. All questions were answered.     This scribe documentation was reviewed by me and accurately reflects the examination and decisions made by me. This note will not be viewable in [address]

## 2020-01-06 NOTE — PROGRESS NOTES
Visit Vitals  BP (!) 153/93 (BP 1 Location: Left arm, BP Patient Position: Sitting)   Pulse 80   Temp 98.1 °F (36.7 °C) (Oral)   Resp 20   Ht 6' 1\" (1.854 m)   Wt 346 lb 6.4 oz (157.1 kg)   SpO2 96%   BMI 45.70 kg/m²           Chief Complaint   Patient presents with    Weight Management     Phentermine, EKG     Other     EKG              HM Due reviewed and WNL. EKG completed. 1. Have you been to the ER, urgent care clinic since your last visit? Hospitalized since your last visit? Denies     2. Have you seen or consulted any other health care providers outside of the [address] [place] [address] since your last visit? Include any pap smears or colon screening.  [address] [place] [address]

## 2020-01-07 LAB
25(OH)D3+25(OH)D2 SERPL-MCNC: 18.2 NG/ML (ref 30–100)
ALBUMIN SERPL-MCNC: 4.7 G/DL (ref 3.5–5.5)
ALBUMIN/GLOB SERPL: 1.7 {RATIO} (ref 1.2–2.2)
ALP SERPL-CCNC: 59 IU/L (ref 39–117)
ALT SERPL-CCNC: 38 IU/L (ref 0–44)
AST SERPL-CCNC: 24 IU/L (ref 0–40)
BILIRUB SERPL-MCNC: 0.3 MG/DL (ref 0–1.2)
BUN SERPL-MCNC: 16 MG/DL (ref 6–24)
BUN/CREAT SERPL: 17 (ref 9–20)
CALCIUM SERPL-MCNC: 10.1 MG/DL (ref 8.7–10.2)
CHLORIDE SERPL-SCNC: 101 MMOL/L (ref 96–106)
CHOLEST SERPL-MCNC: 157 MG/DL (ref 100–199)
CO2 SERPL-SCNC: 25 MMOL/L (ref 20–29)
CREAT SERPL-MCNC: 0.93 MG/DL (ref 0.76–1.27)
EST. AVERAGE GLUCOSE BLD GHB EST-MCNC: 157 MG/DL
GLOBULIN SER CALC-MCNC: 2.8 G/DL (ref 1.5–4.5)
GLUCOSE SERPL-MCNC: 119 MG/DL (ref 65–99)
HBA1C MFR BLD: 7.1 % (ref 4.8–5.6)
HDLC SERPL-MCNC: 44 MG/DL
LDLC SERPL CALC-MCNC: 96 MG/DL (ref 0–99)
POTASSIUM SERPL-SCNC: 4.6 MMOL/L (ref 3.5–5.2)
PROT SERPL-MCNC: 7.5 G/DL (ref 6–8.5)
SODIUM SERPL-SCNC: 142 MMOL/L (ref 134–144)
TRIGL SERPL-MCNC: 83 MG/DL (ref 0–149)
VLDLC SERPL CALC-MCNC: 17 MG/DL (ref 5–40)

## 2020-01-07 NOTE — PROGRESS NOTES
Cesario Snider, your HbA1C has improved significantly. Continue the same  regimen. Vitamin D came back low. Please take over the counter Vitamin D3 1000 I.U daily dose.

## 2020-01-23 DIAGNOSIS — E11.9 TYPE 2 DIABETES MELLITUS WITHOUT COMPLICATION, WITHOUT LONG-TERM CURRENT USE OF INSULIN (HCC): ICD-10-CM

## 2020-01-23 DIAGNOSIS — E78.2 MIXED HYPERLIPIDEMIA: ICD-10-CM

## 2020-01-23 RX ORDER — PRAVASTATIN SODIUM 10 MG/1
TABLET ORAL
Qty: 90 TAB | Refills: 0 | Status: SHIPPED | OUTPATIENT
Start: 2020-01-23 | End: 2020-04-16

## 2020-01-23 RX ORDER — METFORMIN HYDROCHLORIDE 850 MG/1
TABLET ORAL
Qty: 180 TAB | Refills: 0 | Status: SHIPPED | OUTPATIENT
Start: 2020-01-23 | End: 2020-04-16

## 2020-02-05 ENCOUNTER — OFFICE VISIT (OUTPATIENT)
Dept: PRIMARY CARE CLINIC | Age: 45
End: 2020-02-05

## 2020-02-05 VITALS
SYSTOLIC BLOOD PRESSURE: 134 MMHG | RESPIRATION RATE: 18 BRPM | WEIGHT: 315 LBS | HEART RATE: 90 BPM | OXYGEN SATURATION: 97 % | HEIGHT: 73 IN | BODY MASS INDEX: 41.75 KG/M2 | TEMPERATURE: 98.9 F | DIASTOLIC BLOOD PRESSURE: 86 MMHG

## 2020-02-05 DIAGNOSIS — J01.00 ACUTE NON-RECURRENT MAXILLARY SINUSITIS: Primary | ICD-10-CM

## 2020-02-05 DIAGNOSIS — R53.83 OTHER FATIGUE: ICD-10-CM

## 2020-02-05 DIAGNOSIS — R05.9 COUGH: ICD-10-CM

## 2020-02-05 DIAGNOSIS — I10 ESSENTIAL HYPERTENSION: ICD-10-CM

## 2020-02-05 DIAGNOSIS — E11.9 TYPE 2 DIABETES MELLITUS WITHOUT COMPLICATION, WITHOUT LONG-TERM CURRENT USE OF INSULIN (HCC): ICD-10-CM

## 2020-02-05 DIAGNOSIS — R09.82 POSTNASAL DRIP: ICD-10-CM

## 2020-02-05 RX ORDER — AMOXICILLIN AND CLAVULANATE POTASSIUM 875; 125 MG/1; MG/1
TABLET, FILM COATED ORAL EVERY 12 HOURS
COMMUNITY
End: 2020-05-22 | Stop reason: ALTCHOICE

## 2020-02-05 RX ORDER — FLUTICASONE PROPIONATE 50 MCG
SPRAY, SUSPENSION (ML) NASAL
Qty: 1 BOTTLE | Refills: 0 | Status: SHIPPED | OUTPATIENT
Start: 2020-02-05

## 2020-02-05 RX ORDER — BENZONATATE 200 MG/1
200 CAPSULE ORAL
Qty: 21 CAP | Refills: 0 | Status: SHIPPED | OUTPATIENT
Start: 2020-02-05 | End: 2020-02-12

## 2020-02-05 NOTE — PROGRESS NOTES
Visit Vitals  BP (!) 156/99 (BP 1 Location: Left arm, BP Patient Position: Sitting)   Pulse 90   Temp 98.9 °F (37.2 °C) (Oral)   Resp 18   Ht 6' 1\" (1.854 m)   Wt 343 lb 12.8 oz (155.9 kg)   SpO2 97%   BMI 45.36 kg/m²             Chief Complaint   Patient presents with    Sinus Infection     intermittent, persistent, 2-3 weeks, OTC works for short period and then s/s resume     Fatigue     Patient states his energy level is extremely poor last 2-3 weeks     Cough     Persistent, wakes at night, post-nasal drip, coughing 2-3 weeks            HM Due WNL. States his Eye Dr. Is on maternity leave and he will schedule exam when she returns. 1. Have you been to the ER, urgent care clinic since your last visit? Hospitalized since your last visit? Urgent Care on 02/01/2020-[facility], prescribed Augmentin     2. Have you seen or consulted any other health care providers outside of the [address] [place] [address] since your last visit? Include any pap smears or colon screening.  Eye Doctor

## 2020-02-05 NOTE — PROGRESS NOTES
Written by Glynn Live, as dictated by Dr. Shanda Cho MD.    Afua Hurst is a 38 y.o. male. HPI  The patient presents today c/o intermittent, persistent, sinus pain x 2-3 weeks. He also endorses fatigue and cough. As the symptoms progressively worsened he called [facility] on 02/01/2020, and was prescribed Augmentin for a sinus infection. He notes that Augmentin has helped a little bit, but not much. He still has post-nasal drip, and cough, which keeps him up at night. He has also tried OTC medication, which helps for 3 hours, but then he has to wait until the next dose. He does have some stomach issues due to the Augmentin, but admits that he did not take a probiotic, as he was not aware he should. He has lost weight. He weighs 343 lbs today and weighed 346 lbs on 01/06/2020. He notes he stopped taking phentermine when he started taking OTC cold medications. BP is high today at 156/99, 134/86 on repeat. He has not taken his BP medication, and believes that his BP may be elevated due to not feeling well. Otherwise compliant on Lisinopril 5 mg. Compliant on Metformin 850 mg, and denies side effects. He is taking Vitamin D BID. He received the flu shot this season. Patient Active Problem List   Diagnosis Code    Essential hypertension I10    Type 2 diabetes mellitus without complication ([facility] A74.8    Morbid obesity ([facility] [address] [facility] E66.01    Renal calculus, left N20.0        Current Outpatient Medications on File Prior to Visit   Medication Sig Dispense Refill    amoxicillin-clavulanate (AUGMENTIN) 875-125 mg per tablet Take  by mouth every twelve (12) hours.  metFORMIN (GLUCOPHAGE) 850 mg tablet TAKE 1 TABLET BY MOUTH TWO TIMES DAILY WITH MEALS FOR 90 DAYS.  180 Tab 0    pravastatin (PRAVACHOL) 10 mg tablet TAKE 1 TABLET BY MOUTH NIGHTLY 90 Tab 0    lisinopril (PRINIVIL, ZESTRIL) 5 mg tablet TAKE 1 TABLET BY MOUTH EVERY DAY 90 Tab 1    ALPRAZolam Hu Re) 0.5 mg tablet Take 1 Tab by mouth nightly as needed for Anxiety for up to 5 doses. Max Daily Amount: 0.5 mg. 5 Tab 0    [DISCONTINUED] phentermine (ADIPEX-P) 37.5 mg tablet Take 1 Tab by mouth every morning for 30 days. Max Daily Amount: 37.5 mg. 30 Tab 0     No current facility-administered medications on file prior to visit.         Allergies   Allergen Reactions    Wellbutrin [Bupropion Hcl] Unknown (comments)       Past Medical History:   Diagnosis Date    Diabetes ([facility] [address] [facility]     Hypertension        Past Surgical History:   Procedure Laterality Date    HX TONSILLECTOMY  200       Family History   Problem Relation Age of Onset    Hypertension Mother     Headache Mother     Headache Sister     Diabetes Maternal Grandfather        Social History     Socioeconomic History    Marital status:      Spouse name: Not on file    Number of children: Not on file    Years of education: Not on file    Highest education level: Not on file   Occupational History    Not on file   Social Needs    Financial resource strain: Not on file    Food insecurity:     Worry: Not on file     Inability: Not on file    Transportation needs:     Medical: Not on file     Non-medical: Not on file   Tobacco Use    Smoking status: Never Smoker    Smokeless tobacco: Never Used   Substance and Sexual Activity    Alcohol use: No    Drug use: No    Sexual activity: Yes     Partners: Female   Lifestyle    Physical activity:     Days per week: Not on file     Minutes per session: Not on file    Stress: Not on file   Relationships    Social connections:     Talks on phone: Not on file     Gets together: Not on file     Attends Anabaptist service: Not on file     Active member of club or organization: Not on file     Attends meetings of clubs or organizations: Not on file     Relationship status: Not on file    Intimate partner violence:     Fear of current or ex partner: Not on file     Emotionally abused: Not on file Physically abused: Not on file     Forced sexual activity: Not on file   Other Topics Concern    Not on file   Social History Narrative    Not on file       Office Visit on 01/06/2020   Component Date Value Ref Range Status    Glucose 01/06/2020 119* 65 - 99 mg/dL Final    BUN 01/06/2020 16  6 - 24 mg/dL Final    Creatinine 01/06/2020 0.93  0.76 - 1.27 mg/dL Final    GFR est non-AA 01/06/2020 99  >59 mL/min/1.73 Final    GFR est AA 01/06/2020 114  >59 mL/min/1.73 Final    BUN/Creatinine ratio 01/06/2020 17  9 - 20 Final    Sodium 01/06/2020 142  134 - 144 mmol/L Final    Potassium 01/06/2020 4.6  3.5 - 5.2 mmol/L Final    Chloride 01/06/2020 101  96 - 106 mmol/L Final    CO2 01/06/2020 25  20 - 29 mmol/L Final    Calcium 01/06/2020 10.1  8.7 - 10.2 mg/dL Final    Protein, total 01/06/2020 7.5  6.0 - 8.5 g/dL Final    Albumin 01/06/2020 4.7  3.5 - 5.5 g/dL Final    GLOBULIN, TOTAL 01/06/2020 2.8  1.5 - 4.5 g/dL Final    A-G Ratio 01/06/2020 1.7  1.2 - 2.2 Final    Bilirubin, total 01/06/2020 0.3  0.0 - 1.2 mg/dL Final    Alk. phosphatase 01/06/2020 59  39 - 117 IU/L Final    AST (SGOT) 01/06/2020 24  0 - 40 IU/L Final    ALT (SGPT) 01/06/2020 38  0 - 44 IU/L Final    Cholesterol, total 01/06/2020 157  100 - 199 mg/dL Final    Triglyceride 01/06/2020 83  0 - 149 mg/dL Final    HDL Cholesterol 01/06/2020 44  >39 mg/dL Final    VLDL, calculated 01/06/2020 17  5 - 40 mg/dL Final    LDL, calculated 01/06/2020 96  0 - 99 mg/dL Final    VITAMIN D, 25-HYDROXY 01/06/2020 18.2* 30.0 - 100.0 ng/mL Final    Hemoglobin A1c 01/06/2020 7.1* 4.8 - 5.6 % Final    Estimated average glucose 01/06/2020 157  mg/dL Final       Review of Systems   Constitutional: Positive for malaise/fatigue. Negative for weight loss. HENT: Positive for congestion and sinus pain. Negative for hearing loss. Respiratory: Positive for cough. Negative for shortness of breath.     Gastrointestinal: Negative for constipation, diarrhea and heartburn. Musculoskeletal: Negative for joint pain and myalgias. Neurological: Negative for dizziness and headaches. Psychiatric/Behavioral: Negative for depression and substance abuse. The patient is not nervous/anxious and does not have insomnia. Visit Vitals  /86 (BP 1 Location: Left arm, BP Patient Position: Sitting) Comment: hasn't had AM meds yet   Pulse 90   Temp 98.9 °F (37.2 °C) (Oral)   Resp 18   Ht 6' 1\" (1.854 m)   Wt 343 lb 12.8 oz (155.9 kg)   SpO2 97%   BMI 45.36 kg/m²       Physical Exam  Vitals signs and nursing note reviewed. Constitutional:       General: He is not in acute distress. Appearance: Normal appearance. He is well-developed and well-groomed. He is morbidly obese. He is not diaphoretic. HENT:      Head: Normocephalic and atraumatic. Right Ear: Tympanic membrane, ear canal and external ear normal.      Left Ear: Tympanic membrane, ear canal and external ear normal.      Ears:      Comments: + R ear with fluid behind TM     Nose:      Right Sinus: Maxillary sinus tenderness (mild) present. Left Sinus: Maxillary sinus tenderness (mild) present. Mouth/Throat:      Mouth: Mucous membranes are moist.      Pharynx: Oropharynx is clear. Eyes:      General:         Right eye: No discharge. Left eye: No discharge. Conjunctiva/sclera: Conjunctivae normal.      Pupils: Pupils are equal, round, and reactive to light. Neck:      Musculoskeletal: Normal range of motion and neck supple. Cardiovascular:      Rate and Rhythm: Normal rate and regular rhythm. Heart sounds: Normal heart sounds. No murmur. No friction rub. No gallop. Pulmonary:      Effort: Pulmonary effort is normal.      Breath sounds: Normal breath sounds. No wheezing. Abdominal:      General: Bowel sounds are normal.      Palpations: Abdomen is soft. Tenderness: There is no abdominal tenderness. Musculoskeletal: Normal range of motion. Lymphadenopathy:      Cervical: Cervical adenopathy present. Neurological:      Mental Status: He is alert and oriented to person, place, and time. Deep Tendon Reflexes: Reflexes are normal and symmetric. Psychiatric:         Behavior: Behavior normal.         Thought Content: Thought content normal.         ASSESSMENT and PLAN    ICD-10-CM ICD-9-CM    1. Acute non-recurrent maxillary sinusitis J01.00 461.0 Advised patient to start taking Probiotics while on Augmentin. Pt should continue taking Augmentin as previously prescribed. 2. Other fatigue R53.83 780.79 Should improve with resolution of sinusitis. Patient should continue taking Augmentin as previously prescribed. 3. Essential hypertension I10 401.9 BP is well-controlled on current medication. No change to dosage at this time. 4. Postnasal drip R09.82 784.91 fluticasone propionate (FLONASE) 50 mcg/actuation nasal spray sent to pharmacy. Flonase nasal spray prescribed. Potential side effects were discussed. Pt should use 1 spray per nostril nightly x 5 days. 5. Cough R05 786.2 benzonatate (TESSALON) 200 mg capsule sent to pharmacy. Tessalon perle 200 mg prescribed. Potential side effects were discussed. Pt should take 1 perle TID as needed for cough. 6. Type 2 diabetes mellitus without complication, without long-term current use of insulin (HCC) E11.9 250.00 Labs done on 0106 reviewed. Doing well on Metformin. This plan was reviewed with the patient and patient agrees. All questions were answered. This scribe documentation was reviewed by me and accurately reflects the examination and decisions made by me. This note will not be viewable in [address]

## 2020-04-16 DIAGNOSIS — E11.9 TYPE 2 DIABETES MELLITUS WITHOUT COMPLICATION, WITHOUT LONG-TERM CURRENT USE OF INSULIN (HCC): ICD-10-CM

## 2020-04-16 DIAGNOSIS — E78.2 MIXED HYPERLIPIDEMIA: ICD-10-CM

## 2020-04-16 RX ORDER — PRAVASTATIN SODIUM 10 MG/1
TABLET ORAL
Qty: 90 TAB | Refills: 0 | Status: SHIPPED | OUTPATIENT
Start: 2020-04-16 | End: 2020-07-08

## 2020-04-16 RX ORDER — METFORMIN HYDROCHLORIDE 850 MG/1
TABLET ORAL
Qty: 180 TAB | Refills: 0 | Status: SHIPPED | OUTPATIENT
Start: 2020-04-16 | End: 2020-07-08

## 2020-04-17 ENCOUNTER — VIRTUAL VISIT (OUTPATIENT)
Dept: PRIMARY CARE CLINIC | Age: 45
End: 2020-04-17

## 2020-04-17 DIAGNOSIS — M79.89 TOE SWELLING: Primary | ICD-10-CM

## 2020-04-17 DIAGNOSIS — L03.032 CELLULITIS OF GREAT TOE OF LEFT FOOT: ICD-10-CM

## 2020-04-17 DIAGNOSIS — E11.9 TYPE 2 DIABETES MELLITUS WITHOUT COMPLICATION, WITHOUT LONG-TERM CURRENT USE OF INSULIN (HCC): ICD-10-CM

## 2020-04-17 DIAGNOSIS — L60.0 INGROWN NAIL: ICD-10-CM

## 2020-04-17 RX ORDER — SULFAMETHOXAZOLE AND TRIMETHOPRIM 800; 160 MG/1; MG/1
1 TABLET ORAL 2 TIMES DAILY
Qty: 20 TAB | Refills: 0 | Status: SHIPPED | OUTPATIENT
Start: 2020-04-17 | End: 2020-04-27

## 2020-04-17 NOTE — PROGRESS NOTES
Written by Abundio Lockhart, as dictated by Dr. Kwame Lujan MD.    Shikha Wilcox is a 38 y.o. male. HPI  I was in the office while conducting this encounter. Consent:  He and/or his healthcare decision maker is aware that this patient-initiated Telehealth encounter is a billable service, with coverage as determined by his insurance carrier. He is aware that he may receive a bill and has provided verbal consent to proceed: Yes    This virtual visit was conducted via [address] Pursuant to the emergency declaration under the [facility] waiver authority and the [employer] and [employer] Act, this Virtual  Visit was conducted to reduce the patient's risk of exposure to COVID-19 and provide continuity of care for an established patient. Services were provided through a video synchronous discussion virtually to substitute for in-person clinic visit. Due to this being a TeleHealth evaluation, many elements of the physical examination are unable to be assessed. The patient presents today c/o L toe pain. He reports his toes hurt and he believes he has ingrown toenails on both of his great  toe. He is experincing some numbness and swelling at the tip of his L great toe. He notes it is not oozing and is not malodorous. He states it hurts when he bumps it or puts his shoe on. He is concerned that his toe is going to get worse. He has a hx of diabetes and his blood sugar has been around . He reports he has not been checking his blood sugar as often as he should be .         Patient Active Problem List   Diagnosis Code    Essential hypertension I10    Type 2 diabetes mellitus without complication ([address] G68.9    Morbid obesity ([facility] [address] E66.01    Renal calculus, left N20.0        Current Outpatient Medications on File Prior to Visit   Medication Sig Dispense Refill    pravastatin (PRAVACHOL) 10 mg tablet TAKE 1 TABLET BY MOUTH EVERY DAY AT NIGHT 90 Tab 0    metFORMIN (GLUCOPHAGE) 850 mg tablet TAKE 1 TABLET BY MOUTH TWO TIMES DAILY WITH MEALS FOR 90 DAYS. 180 Tab 0    amoxicillin-clavulanate (AUGMENTIN) 875-125 mg per tablet Take  by mouth every twelve (12) hours.  fluticasone propionate (FLONASE) 50 mcg/actuation nasal spray 1 spray each nostril daily 1 Bottle 0    ALPRAZolam (XANAX) 0.5 mg tablet Take 1 Tab by mouth nightly as needed for Anxiety for up to 5 doses. Max Daily Amount: 0.5 mg. 5 Tab 0     No current facility-administered medications on file prior to visit.         Allergies   Allergen Reactions    Wellbutrin [Bupropion Hcl] Unknown (comments)       Past Medical History:   Diagnosis Date    Diabetes ([facility] [address]     Hypertension        Past Surgical History:   Procedure Laterality Date    HX TONSILLECTOMY  200       Family History   Problem Relation Age of Onset    Hypertension Mother     Headache Mother     Headache Sister     Diabetes Maternal Grandfather        Social History     Socioeconomic History    Marital status:      Spouse name: Not on file    Number of children: Not on file    Years of education: Not on file    Highest education level: Not on file   Occupational History    Not on file   Social Needs    Financial resource strain: Not on file    Food insecurity     Worry: Not on file     Inability: Not on file    Transportation needs     Medical: Not on file     Non-medical: Not on file   Tobacco Use    Smoking status: Never Smoker    Smokeless tobacco: Never Used   Substance and Sexual Activity    Alcohol use: No    Drug use: No    Sexual activity: Yes     Partners: Female   Lifestyle    Physical activity     Days per week: Not on file     Minutes per session: Not on file    Stress: Not on file   Relationships    Social connections     Talks on phone: Not on file     Gets together: Not on file     Attends Yazidism service: Not on file     Active member of club or organization: Not on file     Attends meetings of clubs or organizations: Not on file     Relationship status: Not on file    Intimate partner violence     Fear of current or ex partner: Not on file     Emotionally abused: Not on file     Physically abused: Not on file     Forced sexual activity: Not on file   Other Topics Concern    Not on file   Social History Narrative    Not on file       No visits with results within 3 Month(s) from this visit. Latest known visit with results is:   Office Visit on 01/06/2020   Component Date Value Ref Range Status    Glucose 01/06/2020 119* 65 - 99 mg/dL Final    BUN 01/06/2020 16  6 - 24 mg/dL Final    Creatinine 01/06/2020 0.93  0.76 - 1.27 mg/dL Final    GFR est non-AA 01/06/2020 99  >59 mL/min/1.73 Final    GFR est AA 01/06/2020 114  >59 mL/min/1.73 Final    BUN/Creatinine ratio 01/06/2020 17  9 - 20 Final    Sodium 01/06/2020 142  134 - 144 mmol/L Final    Potassium 01/06/2020 4.6  3.5 - 5.2 mmol/L Final    Chloride 01/06/2020 101  96 - 106 mmol/L Final    CO2 01/06/2020 25  20 - 29 mmol/L Final    Calcium 01/06/2020 10.1  8.7 - 10.2 mg/dL Final    Protein, total 01/06/2020 7.5  6.0 - 8.5 g/dL Final    Albumin 01/06/2020 4.7  3.5 - 5.5 g/dL Final    Comment:     **Effective January 20, 2020 Albumin reference**        interval will be changing to:                Age                Male          Female            0 -  7 days        3.6 - 4.9      3.6 - 4.9            8 - 30 days        3.4 - 4.7      3.4 - 4.7            1 -  6 month       3.7 - 4.8      3.7 - 4.8     7 months -  2 years       3.9 - 5.0      3.9 - 5.0            3 -  5 years       4.0 - 5.0      4.0 - 5.0            6 - 12 years       4.1 - 5.0      4.0 - 5.0           13 - 30 years       4.1 - 5.2      3.9 - 5.0           31 - 50 years       4.0 - 5.0      3.8 - 4.8           51 - 60 years       3.8 - 4.9      3.8 - 4.9           61 - 70 years       3.8 - 4.8      3.8 - 4.8           71 - 80 years       3.7 - 4.7      3.7 - 4.7           81 - 89 years       3.6 - 4.6      3.6 - 4.6               >89 years       3.5 - 4.6      3.5 - 4.6      GLOBULIN, TOTAL 01/06/2020 2.8  1.5 - 4.5 g/dL Final    A-G Ratio 01/06/2020 1.7  1.2 - 2.2 Final    Bilirubin, total 01/06/2020 0.3  0.0 - 1.2 mg/dL Final    Alk. phosphatase 01/06/2020 59  39 - 117 IU/L Final    AST (SGOT) 01/06/2020 24  0 - 40 IU/L Final    ALT (SGPT) 01/06/2020 38  0 - 44 IU/L Final    Cholesterol, total 01/06/2020 157  100 - 199 mg/dL Final    Triglyceride 01/06/2020 83  0 - 149 mg/dL Final    HDL Cholesterol 01/06/2020 44  >39 mg/dL Final    VLDL, calculated 01/06/2020 17  5 - 40 mg/dL Final    LDL, calculated 01/06/2020 96  0 - 99 mg/dL Final    VITAMIN D, 25-HYDROXY 01/06/2020 18.2* 30.0 - 100.0 ng/mL Final    Comment: Vitamin D deficiency has been defined by the [place] of  Medicine and an Endocrine Society practice guideline as a  level of serum 25-OH vitamin D less than 20 ng/mL (1,2). The Endocrine Society went on to further define vitamin D  insufficiency as a level between 21 and 29 ng/mL (2). 1. IOM ([place] of [employer]). 2010. Dietary reference     intakes for calcium and D. [facility]: The     [place]. 2. Griselda MF, Carlene NC, Dread VAZQUEZ, et al.     Evaluation, treatment, and prevention of vitamin D     deficiency: an Endocrine Society clinical practice     guideline. JCEM. 2011 Jul; 96(7):1911-30.  Hemoglobin A1c 01/06/2020 7.1* 4.8 - 5.6 % Final    Comment:          Prediabetes: 5.7 - 6.4           Diabetes: >6.4           Glycemic control for adults with diabetes: <7.0      Estimated average glucose 01/06/2020 157  mg/dL Final       Review of Systems   Constitutional: Negative for malaise/fatigue. HENT: Negative for congestion. Eyes: Negative for blurred vision. Respiratory: Negative for shortness of breath.     Cardiovascular: Negative for chest pain and leg swelling. Gastrointestinal: Negative for constipation, diarrhea and heartburn. Genitourinary: Negative for dysuria, frequency and urgency. Musculoskeletal: Negative for joint pain and myalgias. Neurological: Negative for dizziness and headaches. Psychiatric/Behavioral: Negative for depression and substance abuse. The patient is not nervous/anxious and does not have insomnia. There were no vitals taken for this visit. Physical Exam  Constitutional:       General: He is not in acute distress. Appearance: He is not diaphoretic. HENT:      Head: Normocephalic and atraumatic. Eyes:      General:         Right eye: No discharge. Left eye: No discharge. Conjunctiva/sclera: Conjunctivae normal.   Pulmonary:      Effort: Pulmonary effort is normal. No respiratory distress. Skin:     Comments: L toe swollen   Nailbed sides red    Neurological:      Mental Status: He is alert and oriented to person, place, and time. Psychiatric:         Behavior: Behavior normal.         Thought Content: Thought content normal.         ASSESSMENT and PLAN      ICD-10-CM ICD-9-CM    1. Toe swelling M79.89 729.81 Explained to pt that he may have an infection and an ingrown toenail and I do not want it to spread to his bone   2. Ingrown nail L60.0 703.0 REFERRAL TO PODIATRY    Referred to podiatry       trimethoprim-sulfamethoxazole (BACTRIM DS, SEPTRA DS) 160-800 mg per tablet sent to pharmacy     [employer]act[employer] DS, Septra -800 prescribed Potential side effects were discussed. Pt should take 1 tab BID x 10 days    3. Cellulitis of great toe of left foot L03.032 681.10         trimethoprim-sulfamethoxazole (BACTRIM DS, SEPTRA DS) 160-800 mg per tablet sent to pharmacy     [employer] DS, Septra -800 prescribed Potential side effects were discussed. Pt should take 1 tab BID x 10 days    4.  Type 2 diabetes mellitus without complication, without long-term current use of insulin (HCC) E11.9 250.00 Advised pt to check his blood sugar more often. Total Time: minutes: 11-20 minutes. This plan was reviewed with the patient and patient agrees. All questions were answered. This scribe documentation was reviewed by me and accurately reflects the examination and decisions made by me. This note will not be viewable in [address]

## 2020-04-17 NOTE — LETTER
NOTIFICATION RETURN TO WORK / SCHOOL 
 
4/17/2020 1:57 PM 
 
Mr. Avelina Vega Pod [facility] [address] [address] To Whom It May Concern: 
 
Avelina Veag is currently under the care of Parag Mejias. He was seen virtually in office today. He has an infection in his toe and I do not want him to wear socks and shoes at this time. He will return to work on: April 27, 2020 If there are questions or concerns please  contact our office. Sincerely, Fallon Espinosa MD

## 2020-05-14 DIAGNOSIS — I10 HYPERTENSION, UNSPECIFIED TYPE: ICD-10-CM

## 2020-05-14 RX ORDER — LISINOPRIL 5 MG/1
TABLET ORAL
Qty: 90 TAB | Refills: 1 | Status: SHIPPED | OUTPATIENT
Start: 2020-05-14 | End: 2020-05-22 | Stop reason: ALTCHOICE

## 2020-05-21 ENCOUNTER — E-VISIT (OUTPATIENT)
Dept: PRIMARY CARE CLINIC | Age: 45
End: 2020-05-21

## 2020-05-22 ENCOUNTER — VIRTUAL VISIT (OUTPATIENT)
Dept: PRIMARY CARE CLINIC | Age: 45
End: 2020-05-22

## 2020-05-22 DIAGNOSIS — J02.9 SORE THROAT: ICD-10-CM

## 2020-05-22 DIAGNOSIS — T46.4X5A COUGH DUE TO ACE INHIBITOR: Primary | ICD-10-CM

## 2020-05-22 DIAGNOSIS — E11.9 TYPE 2 DIABETES MELLITUS WITHOUT COMPLICATION, WITHOUT LONG-TERM CURRENT USE OF INSULIN (HCC): ICD-10-CM

## 2020-05-22 DIAGNOSIS — I10 ESSENTIAL HYPERTENSION: ICD-10-CM

## 2020-05-22 DIAGNOSIS — R05.8 COUGH DUE TO ACE INHIBITOR: Primary | ICD-10-CM

## 2020-05-22 RX ORDER — AMOXICILLIN AND CLAVULANATE POTASSIUM 875; 125 MG/1; MG/1
1 TABLET, FILM COATED ORAL EVERY 12 HOURS
Qty: 20 TAB | Refills: 0 | Status: SHIPPED | OUTPATIENT
Start: 2020-05-22 | End: 2020-06-01

## 2020-05-22 RX ORDER — LOSARTAN POTASSIUM 25 MG/1
25 TABLET ORAL DAILY
Qty: 90 TAB | Refills: 0 | Status: SHIPPED | OUTPATIENT
Start: 2020-05-22 | End: 2020-08-06 | Stop reason: ALTCHOICE

## 2020-05-22 NOTE — PROGRESS NOTES
Written by Nando Charles, as dictated by Dr. Familia Velazquez MD.    Sarah Gatica is a 38 y.o. male. HPI  I was in the office while conducting this encounter. Consent:  He and/or his healthcare decision maker is aware that this patient-initiated Telehealth encounter is a billable service, with coverage as determined by his insurance carrier. He is aware that he may receive a bill and has provided verbal consent to proceed: Yes    This virtual visit was conducted via [address] Pursuant to the emergency declaration under the [facility], 1135 waiver authority and the [employer] Act, this Virtual  Visit was conducted to reduce the patient's risk of exposure to COVID-19 and provide continuity of care for an established patient. Services were provided through a video synchronous discussion virtually to substitute for in-person clinic visit. Due to this being a TeleHealth evaluation, many elements of the physical examination are unable to be assessed. The patient presents today c/o persistent dry cough. He is unsure if his cough is from Lisinopril 5 mg or due to environmental triggers. His cough was worse yesterday and caused him to have a sore throat. His throat does not hurt in the morning but at night his throat feels like it is on fire. He was around a lot of insulation and is wondering if that exacerbated his cough. He does get nauseous during the day but attributes that to nasal drainage. He checked him temperature yesterday and it was 97.8. His blood sugar is averaging around 020-704 systolic. His blood sugar fasting is around 115. He is compliant on Metformin 850 mg. He does not go out often, he does not always wear a mask when he goes out because he is claustrophobic.        Patient Active Problem List   Diagnosis Code    Essential hypertension I10    Type 2 diabetes mellitus without complication (HCC) T98.9    Morbid obesity (HCC) E66.01    Renal calculus, left N20.0        Current Outpatient Medications on File Prior to Visit   Medication Sig Dispense Refill    lisinopriL (PRINIVIL, ZESTRIL) 5 mg tablet TAKE 1 TABLET BY MOUTH EVERY DAY 90 Tab 1    pravastatin (PRAVACHOL) 10 mg tablet TAKE 1 TABLET BY MOUTH EVERY DAY AT NIGHT 90 Tab 0    metFORMIN (GLUCOPHAGE) 850 mg tablet TAKE 1 TABLET BY MOUTH TWO TIMES DAILY WITH MEALS FOR 90 DAYS. 180 Tab 0    amoxicillin-clavulanate (AUGMENTIN) 875-125 mg per tablet Take  by mouth every twelve (12) hours.  fluticasone propionate (FLONASE) 50 mcg/actuation nasal spray 1 spray each nostril daily 1 Bottle 0    ALPRAZolam (XANAX) 0.5 mg tablet Take 1 Tab by mouth nightly as needed for Anxiety for up to 5 doses. Max Daily Amount: 0.5 mg. 5 Tab 0     No current facility-administered medications on file prior to visit. Allergies   Allergen Reactions    Wellbutrin [Bupropion Hcl] Unknown (comments)       Past Medical History:   Diagnosis Date    Diabetes ([address]     Hypertension        Past Surgical History:   Procedure Laterality Date    HX TONSILLECTOMY  200       Family History   Problem Relation Age of Onset    Hypertension Mother     Headache Mother     Headache Sister     Diabetes Maternal Grandfather        Social History     Socioeconomic History    Marital status:      Spouse name: Not on file    Number of children: Not on file    Years of education: Not on file    Highest education level: Not on file   Occupational History    Not on file   Social Needs    Financial resource strain: Not on file    Food insecurity     Worry: Not on file     Inability: Not on file    Transportation needs     Medical: Not on file     Non-medical: Not on file   Tobacco Use    Smoking status: Never Smoker    Smokeless tobacco: Never Used   Substance and Sexual Activity    Alcohol use: No    Drug use:  No  Sexual activity: Yes     Partners: Female   Lifestyle    Physical activity     Days per week: Not on file     Minutes per session: Not on file    Stress: Not on file   Relationships    Social connections     Talks on phone: Not on file     Gets together: Not on file     Attends Rastafarian service: Not on file     Active member of club or organization: Not on file     Attends meetings of clubs or organizations: Not on file     Relationship status: Not on file    Intimate partner violence     Fear of current or ex partner: Not on file     Emotionally abused: Not on file     Physically abused: Not on file     Forced sexual activity: Not on file   Other Topics Concern    Not on file   Social History Narrative    Not on file       No visits with results within 3 Month(s) from this visit. Latest known visit with results is:   Office Visit on 01/06/2020   Component Date Value Ref Range Status    Glucose 01/06/2020 119* 65 - 99 mg/dL Final    BUN 01/06/2020 16  6 - 24 mg/dL Final    Creatinine 01/06/2020 0.93  0.76 - 1.27 mg/dL Final    GFR est non-AA 01/06/2020 99  >59 mL/min/1.73 Final    GFR est AA 01/06/2020 114  >59 mL/min/1.73 Final    BUN/Creatinine ratio 01/06/2020 17  9 - 20 Final    Sodium 01/06/2020 142  134 - 144 mmol/L Final    Potassium 01/06/2020 4.6  3.5 - 5.2 mmol/L Final    Chloride 01/06/2020 101  96 - 106 mmol/L Final    CO2 01/06/2020 25  20 - 29 mmol/L Final    Calcium 01/06/2020 10.1  8.7 - 10.2 mg/dL Final    Protein, total 01/06/2020 7.5  6.0 - 8.5 g/dL Final    Albumin 01/06/2020 4.7  3.5 - 5.5 g/dL Final    Comment:     **Effective January 20, 2020 Albumin reference**        interval will be changing to:                Age                Male          Female            0 -  7 days        3.6 - 4.9      3.6 - 4.9            8 - 30 days        3.4 - 4.7      3.4 - 4.7            1 -  6 month       3.7 - 4.8      3.7 - 4.8     7 months -  2 years       3.9 - 5.0      3.9 - 5.0            3 -  5 years       4.0 - 5.0      4.0 - 5.0            6 - 12 years       4.1 - 5.0      4.0 - 5.0           13 - 30 years       4.1 - 5.2      3.9 - 5.0           31 - 50 years       4.0 - 5.0      3.8 - 4.8           51 - 60 years       3.8 - 4.9      3.8 - 4.9           61 - 70 years       3.8 - 4.8      3.8 - 4.8           71 - 80 years       3.7 - 4.7      3.7 - 4.7           81 - 89 years       3.6 - 4.6      3.6 - 4.6               >89 years       3.5 - 4.6      3.5 - 4.6      GLOBULIN, TOTAL 01/06/2020 2.8  1.5 - 4.5 g/dL Final    A-G Ratio 01/06/2020 1.7  1.2 - 2.2 Final    Bilirubin, total 01/06/2020 0.3  0.0 - 1.2 mg/dL Final    Alk. phosphatase 01/06/2020 59  39 - 117 IU/L Final    AST (SGOT) 01/06/2020 24  0 - 40 IU/L Final    ALT (SGPT) 01/06/2020 38  0 - 44 IU/L Final    Cholesterol, total 01/06/2020 157  100 - 199 mg/dL Final    Triglyceride 01/06/2020 83  0 - 149 mg/dL Final    HDL Cholesterol 01/06/2020 44  >39 mg/dL Final    VLDL, calculated 01/06/2020 17  5 - 40 mg/dL Final    LDL, calculated 01/06/2020 96  0 - 99 mg/dL Final    VITAMIN D, 25-HYDROXY 01/06/2020 18.2* 30.0 - 100.0 ng/mL Final    Comment: Vitamin D deficiency has been defined by the [place] of  Medicine and an Endocrine Society practice guideline as a  level of serum 25-OH vitamin D less than 20 ng/mL (1,2). The Endocrine Society went on to further define vitamin D  insufficiency as a level between 21 and 29 ng/mL (2). 1. IOM ([place] of Medicine). 2010. Dietary reference     intakes for calcium and D. [facility]: The     [employer]. 2. Griselda MF, Carlene BOWDEN, Dread VAZQUEZ, et al.     Evaluation, treatment, and prevention of vitamin D     deficiency: an Endocrine Society clinical practice     guideline. JCEM. 2011 Jul; 96(7):1911-30.       Hemoglobin A1c 01/06/2020 7.1* 4.8 - 5.6 % Final    Comment:          Prediabetes: 5.7 - 6.4           Diabetes: >6.4           Glycemic control for adults with diabetes: <7.0      Estimated average glucose 01/06/2020 157  mg/dL Final     Review of Systems   Constitutional: Negative for malaise/fatigue. HENT: Positive for sore throat. Negative for congestion. Eyes: Negative for blurred vision. Respiratory: Positive for cough. Negative for shortness of breath. Cardiovascular: Negative for chest pain and leg swelling. Gastrointestinal: Positive for nausea. Negative for constipation, diarrhea and heartburn. Genitourinary: Negative for dysuria, frequency and urgency. Musculoskeletal: Negative for joint pain and myalgias. Neurological: Negative for dizziness and headaches. Psychiatric/Behavioral: Negative for depression and substance abuse. The patient is not nervous/anxious and does not have insomnia. There were no vitals taken for this visit. Physical Exam  Constitutional:       General: He is not in acute distress. Appearance: He is not diaphoretic. HENT:      Head: Normocephalic and atraumatic. Eyes:      General:         Right eye: No discharge. Left eye: No discharge. Conjunctiva/sclera: Conjunctivae normal.   Pulmonary:      Effort: Pulmonary effort is normal. No respiratory distress. Neurological:      Mental Status: He is alert and oriented to person, place, and time. Psychiatric:         Behavior: Behavior normal.         Thought Content: Thought content normal.       ASSESSMENT and PLAN    ICD-10-CM ICD-9-CM    1. Cough due to ACE inhibitor R05 786.2 losartan (COZAAR) 25 mg tablet sent to pharmacy     Losartan 25 mg prescribed. Potential side effects were discussed. Pt should take 1 tab daily    Pt should discontinue Lisinopril     T46.4X5A E942.6    2. Sore throat J02.9 462  amoxicillin-clavulanate (AUGMENTIN) 875-125 mg per tablet sent to pharmacy     Augmentin 875-125 mg prescribed. Potential side effects were discussed.  Pt should take 1 tab every 12 hours x 10 days    Advised pt to not take this yet and to do salt water gargles and if that does not help him then he can take this medication    3. Essential hypertension I10 401.9 losartan (COZAAR) 25 mg tablet sent to pharmacy     Losartan 25 mg prescribed. Potential side effects were discussed. Pt should take 1 tab daily     If this medication lowers BP too much he can take 1/2 tab     Pt should discontinue Lisinopril    4. Type 2 diabetes mellitus without complication, without long-term current use of insulin ([facility]) E11.9 250.00 Pt is compliant on Metformin 850 mg    Advised pt to keep an eye of his blood sugar       Total Time: minutes: 21-30 minutes. This plan was reviewed with the patient and patient agrees. All questions were answered. This scribe documentation was reviewed by me and accurately reflects the examination and decisions made by me. This note will not be viewable in [address]

## 2020-07-08 DIAGNOSIS — E78.2 MIXED HYPERLIPIDEMIA: ICD-10-CM

## 2020-07-08 DIAGNOSIS — F41.9 ANXIETY: Primary | ICD-10-CM

## 2020-07-08 DIAGNOSIS — E11.9 TYPE 2 DIABETES MELLITUS WITHOUT COMPLICATION, WITHOUT LONG-TERM CURRENT USE OF INSULIN (HCC): ICD-10-CM

## 2020-07-08 RX ORDER — METFORMIN HYDROCHLORIDE 850 MG/1
TABLET ORAL
Qty: 180 TAB | Refills: 0 | Status: SHIPPED | OUTPATIENT
Start: 2020-07-08 | End: 2020-10-28

## 2020-07-08 RX ORDER — PRAVASTATIN SODIUM 10 MG/1
TABLET ORAL
Qty: 90 TAB | Refills: 0 | Status: SHIPPED | OUTPATIENT
Start: 2020-07-08 | End: 2020-09-29

## 2020-07-08 RX ORDER — BUSPIRONE HYDROCHLORIDE 10 MG/1
10 TABLET ORAL 2 TIMES DAILY
Qty: 60 TAB | Refills: 0 | Status: SHIPPED | OUTPATIENT
Start: 2020-07-08 | End: 2020-08-06 | Stop reason: SDUPTHER

## 2020-08-06 ENCOUNTER — OFFICE VISIT (OUTPATIENT)
Dept: PRIMARY CARE CLINIC | Age: 45
End: 2020-08-06
Payer: COMMERCIAL

## 2020-08-06 VITALS
HEIGHT: 73 IN | BODY MASS INDEX: 41.75 KG/M2 | OXYGEN SATURATION: 96 % | TEMPERATURE: 97.9 F | RESPIRATION RATE: 16 BRPM | WEIGHT: 315 LBS | SYSTOLIC BLOOD PRESSURE: 136 MMHG | HEART RATE: 88 BPM | DIASTOLIC BLOOD PRESSURE: 88 MMHG

## 2020-08-06 DIAGNOSIS — Z23 NEED FOR DIPHTHERIA-TETANUS-PERTUSSIS (TDAP) VACCINE: ICD-10-CM

## 2020-08-06 DIAGNOSIS — R10.9 CHRONIC LEFT FLANK PAIN: ICD-10-CM

## 2020-08-06 DIAGNOSIS — F41.9 ANXIETY: ICD-10-CM

## 2020-08-06 DIAGNOSIS — E66.01 MORBID OBESITY (HCC): ICD-10-CM

## 2020-08-06 DIAGNOSIS — N20.0 RENAL CALCULUS: ICD-10-CM

## 2020-08-06 DIAGNOSIS — E11.9 TYPE 2 DIABETES MELLITUS WITHOUT COMPLICATION, WITHOUT LONG-TERM CURRENT USE OF INSULIN (HCC): Primary | ICD-10-CM

## 2020-08-06 DIAGNOSIS — G89.29 CHRONIC LEFT FLANK PAIN: ICD-10-CM

## 2020-08-06 DIAGNOSIS — E55.9 VITAMIN D DEFICIENCY: ICD-10-CM

## 2020-08-06 DIAGNOSIS — I10 ESSENTIAL HYPERTENSION: ICD-10-CM

## 2020-08-06 LAB
ALBUMIN UR QL STRIP: 80 MG/L
CREATININE, URINE POC: 300 MG/DL
MICROALBUMIN/CREAT RATIO POC: NORMAL MG/G

## 2020-08-06 PROCEDURE — 99214 OFFICE O/P EST MOD 30 MIN: CPT | Performed by: INTERNAL MEDICINE

## 2020-08-06 PROCEDURE — 82044 UR ALBUMIN SEMIQUANTITATIVE: CPT | Performed by: INTERNAL MEDICINE

## 2020-08-06 PROCEDURE — 3051F HG A1C>EQUAL 7.0%<8.0%: CPT | Performed by: INTERNAL MEDICINE

## 2020-08-06 RX ORDER — LOSARTAN POTASSIUM 50 MG/1
50 TABLET ORAL DAILY
Qty: 90 TAB | Refills: 0 | Status: SHIPPED | OUTPATIENT
Start: 2020-08-06 | End: 2020-10-28

## 2020-08-06 RX ORDER — BUSPIRONE HYDROCHLORIDE 10 MG/1
10 TABLET ORAL 2 TIMES DAILY
Qty: 180 TAB | Refills: 0 | Status: SHIPPED | OUTPATIENT
Start: 2020-08-06 | End: 2020-08-24

## 2020-08-06 NOTE — PROGRESS NOTES
Chief Complaint   Patient presents with    Follow Up Chronic Condition     patient has very low vitamin d count and is diabetic  no concerns to discuss

## 2020-08-06 NOTE — PROGRESS NOTES
Written by Isaac Leija, as dictated by Dr. Mauricio Marin MD.    Elizabeth Acevedo is a 54 y.o. male. HPI  Pt presents fasting today for routine care follow-up and labs. His cough has resolved since d/c lisinopril but he notes that it has been a little high since switching . Pt's BP is elevated today in office at 138/94, 136/88 on manual repeat in R arm while sitting down. However, he also has not taken his medications yet today. He thinks his BP is well managed on the current dose mostly but is open to increasing it. He takes metformin BID and watches his diet carefully. His BG has been up in the 300 range once when he forgot to take metformin for a few days in a row. He has gained weight and weighs 348 lbs today, up from 343 lbs on 2/5/20. He has a lot of home and work stress from moving and renovating his house. His daughter is also struggling with anxiety and depression and is going to college soon. He thinks that his Buspar is helping, though. He has had insomnia which resolved by taking Xanax one night to help him get his sleep back on track. He notices fatigue when he does not eat well, and he notes that this helps him remember to eat healthily to take care of his diabetes. He was having abdominal pain in 2018 and had a CT done on 3/15/20 which showed no acute findings. 1.1 cm probable cyst of right kidney. 6 mm nonobstructing calculus of lower pole of left kidney. He has been having intermittent L flank pain currently and inquires if he could have it re-evaluated. I prescribed the Tdap vaccine in 1/2020 but he forgot to go get it from the pharmacy.        Patient Active Problem List   Diagnosis Code    Essential hypertension I10    Type 2 diabetes mellitus without complication ([address] I31.9    Morbid obesity ([address] E66.01    Renal calculus, left N20.0        Current Outpatient Medications on File Prior to Visit   Medication Sig Dispense Refill    metFORMIN (GLUCOPHAGE) 850 mg tablet TAKE 1 TABLET BY MOUTH 2 TIMES DAILY WITH MEALS. 180 Tab 0    pravastatin (PRAVACHOL) 10 mg tablet TAKE 1 TABLET BY MOUTH EVERY DAY AT NIGHT 90 Tab 0    busPIRone (BUSPAR) 10 mg tablet Take 1 Tab by mouth two (2) times a day for 30 days. 60 Tab 0    losartan (COZAAR) 25 mg tablet Take 1 Tab by mouth daily for 90 days. 90 Tab 0    fluticasone propionate (FLONASE) 50 mcg/actuation nasal spray 1 spray each nostril daily 1 Bottle 0    ALPRAZolam (XANAX) 0.5 mg tablet Take 1 Tab by mouth nightly as needed for Anxiety for up to 5 doses. Max Daily Amount: 0.5 mg. 5 Tab 0     No current facility-administered medications on file prior to visit.         Allergies   Allergen Reactions    Wellbutrin [Bupropion Hcl] Unknown (comments)       Past Medical History:   Diagnosis Date    [facility] [address]     Hypertension        Past Surgical History:   Procedure Laterality Date    HX TONSILLECTOMY  200       Family History   Problem Relation Age of Onset    Hypertension Mother     Headache Mother     Headache Sister     Diabetes Maternal Grandfather        Social History     Socioeconomic History    Marital status:      Spouse name: Not on file    Number of children: Not on file    Years of education: Not on file    Highest education level: Not on file   Occupational History    Not on file   Social Needs    Financial resource strain: Not on file    Food insecurity     Worry: Not on file     Inability: Not on file    Transportation needs     Medical: Not on file     Non-medical: Not on file   Tobacco Use    Smoking status: Never Smoker    Smokeless tobacco: Never Used   Substance and Sexual Activity    Alcohol use: No    Drug use: No    Sexual activity: Yes     Partners: Female   Lifestyle    Physical activity     Days per week: Not on file     Minutes per session: Not on file    Stress: Not on file   Relationships    Social connections     Talks on phone: Not on file     Gets together: Not on file     Attends Faith service: Not on file     Active member of club or organization: Not on file     Attends meetings of clubs or organizations: Not on file     Relationship status: Not on file    Intimate partner violence     Fear of current or ex partner: Not on file     Emotionally abused: Not on file     Physically abused: Not on file     Forced sexual activity: Not on file   Other Topics Concern    Not on file   Social History Narrative    Not on file       No visits with results within 3 Month(s) from this visit. Latest known visit with results is:   Office Visit on 01/06/2020   Component Date Value Ref Range Status    Glucose 01/06/2020 119* 65 - 99 mg/dL Final    BUN 01/06/2020 16  6 - 24 mg/dL Final    Creatinine 01/06/2020 0.93  0.76 - 1.27 mg/dL Final    GFR est non-AA 01/06/2020 99  >59 mL/min/1.73 Final    GFR est AA 01/06/2020 114  >59 mL/min/1.73 Final    BUN/Creatinine ratio 01/06/2020 17  9 - 20 Final    Sodium 01/06/2020 142  134 - 144 mmol/L Final    Potassium 01/06/2020 4.6  3.5 - 5.2 mmol/L Final    Chloride 01/06/2020 101  96 - 106 mmol/L Final    CO2 01/06/2020 25  20 - 29 mmol/L Final    Calcium 01/06/2020 10.1  8.7 - 10.2 mg/dL Final    Protein, total 01/06/2020 7.5  6.0 - 8.5 g/dL Final    Albumin 01/06/2020 4.7  3.5 - 5.5 g/dL Final    Comment:     **Effective January 20, 2020 Albumin reference**        interval will be changing to:                Age                Male          Female            0 -  7 days        3.6 - 4.9      3.6 - 4.9            8 - 30 days        3.4 - 4.7      3.4 - 4.7            1 -  6 month       3.7 - 4.8      3.7 - 4.8     7 months -  2 years       3.9 - 5.0      3.9 - 5.0            3 -  5 years       4.0 - 5.0      4.0 - 5.0            6 - 12 years       4.1 - 5.0      4.0 - 5.0           13 - 30 years       4.1 - 5.2      3.9 - 5.0           31 - 50 years       4.0 - 5.0      3.8 - 4.8           51 - 60 years       3.8 - 4.9      3.8 - 4.9           61 - 70 years       3.8 - 4.8      3.8 - 4.8           71 - 80 years       3.7 - 4.7      3.7 - 4.7           81 - 89 years       3.6 - 4.6      3.6 - 4.6               >89 years       3.5 - 4.6      3.5 - 4.6      GLOBULIN, TOTAL 01/06/2020 2.8  1.5 - 4.5 g/dL Final    A-G Ratio 01/06/2020 1.7  1.2 - 2.2 Final    Bilirubin, total 01/06/2020 0.3  0.0 - 1.2 mg/dL Final    Alk. phosphatase 01/06/2020 59  39 - 117 IU/L Final    AST (SGOT) 01/06/2020 24  0 - 40 IU/L Final    ALT (SGPT) 01/06/2020 38  0 - 44 IU/L Final    Cholesterol, total 01/06/2020 157  100 - 199 mg/dL Final    Triglyceride 01/06/2020 83  0 - 149 mg/dL Final    HDL Cholesterol 01/06/2020 44  >39 mg/dL Final    VLDL, calculated 01/06/2020 17  5 - 40 mg/dL Final    LDL, calculated 01/06/2020 96  0 - 99 mg/dL Final    VITAMIN D, 25-HYDROXY 01/06/2020 18.2* 30.0 - 100.0 ng/mL Final    Comment: Vitamin D deficiency has been defined by the [place] of  Medicine and an Endocrine Society practice guideline as a  level of serum 25-OH vitamin D less than 20 ng/mL (1,2). The Endocrine Society went on to further define vitamin D  insufficiency as a level between 21 and 29 ng/mL (2). 1. IOM ([place] of [employer]). 2010. Dietary reference     intakes for calcium and D. [facility]: The     [employer]. 2. Griselda MF, Carlene [place], Dread VAZQUEZ, et al.     Evaluation, treatment, and prevention of vitamin D     deficiency: an Endocrine Society clinical practice     guideline. JCEM. 2011 Jul; 96(7):1911-30.  Hemoglobin A1c 01/06/2020 7.1* 4.8 - 5.6 % Final    Comment:          Prediabetes: 5.7 - 6.4           Diabetes: >6.4           Glycemic control for adults with diabetes: <7.0      Estimated average glucose 01/06/2020 157  mg/dL Final     Review of Systems   Constitutional: Positive for malaise/fatigue. Negative for weight loss. HENT: Negative for congestion and sore throat. Eyes: Negative for blurred vision. Respiratory: Negative for cough and shortness of breath. Cardiovascular: Negative for chest pain and leg swelling. Gastrointestinal: Negative for constipation and heartburn. Genitourinary: Positive for flank pain (L sided). Negative for frequency and urgency. Musculoskeletal: Negative for back pain, joint pain and myalgias. Neurological: Negative for dizziness and headaches. Psychiatric/Behavioral: Negative for depression. The patient is nervous/anxious and has insomnia (improving ). Visit Vitals  BP (!) 138/94 (BP 1 Location: Left arm, BP Patient Position: Sitting)   Pulse 88   Temp 97.9 °F (36.6 °C) (Temporal)   Resp 16   Ht 6' 1\" (1.854 m)   Wt 348 lb 6.4 oz (158 kg)   SpO2 96%   BMI 45.97 kg/m²     Physical Exam  Vitals signs and nursing note reviewed. Constitutional:       General: He is not in acute distress. Appearance: He is well-developed and well-groomed. He is morbidly obese. He is not diaphoretic. HENT:      Right Ear: External ear normal.      Left Ear: External ear normal.   Eyes:      General: No scleral icterus. Right eye: No discharge. Left eye: No discharge. Extraocular Movements: Extraocular movements intact. Conjunctiva/sclera: Conjunctivae normal.   Neck:      Musculoskeletal: Normal range of motion and neck supple. Cardiovascular:      Rate and Rhythm: Normal rate and regular rhythm. Pulmonary:      Effort: Pulmonary effort is normal.      Breath sounds: Normal breath sounds. No wheezing. Abdominal:      General: Bowel sounds are normal.      Palpations: Abdomen is soft. Tenderness: There is no abdominal tenderness. Lymphadenopathy:      Cervical: No cervical adenopathy. Neurological:      Mental Status: He is alert and oriented to person, place, and time.    Psychiatric:         Mood and Affect: Mood and affect normal.         Behavior: Behavior normal.       ASSESSMENT and PLAN ICD-10-CM ICD-9-CM    1. Type 2 diabetes mellitus without complication, without long-term current use of insulin (HCC)  E11.9 250.00 empagliflozin (JARDIANCE) 10 mg tablet sent to pharmacy. Potential side effects were discussed. We discussed how Katy Hein works and its benefits for diabetes and weight management. I instructed him to take it every day. He mentioned that sometimes with his insurance medications are very expensive, so I told him to let me know if this will not be an affordable option. METABOLIC PANEL, COMPREHENSIVE      HEMOGLOBIN A1C WITH EAG    Ordered fasting labs for pt to complete today in office. Waiting on results. 2. Essential hypertension  I10 401.9 losartan (COZAAR) 50 mg tablet sent to pharmacy. I instructed him to double his daily dose of losartan from 25 mg to 50 mg every day. 3. Vitamin D deficiency  E55.9 268.9 VITAMIN D, 25 HYDROXY    Check vitamin D.      4. Morbid obesity ([address]  E66.01 278.01 Pt is working on his diet and has been focusing on healthy foods that help him maintain a more constant BG and make him feel better. We discussed that he should make sure he is walking throughout the day as well. 5. Chronic left flank pain  R10.9 789.09 CT ABD PELV W WO CONT    I ordered a CT for further evaluation. G89.29 338.29    6. Renal calculus  N20.0 592.0 CT ABD PELV W WO CONT    I ordered a CT for further evaluation. 7. Need for diphtheria-tetanus-pertussis (Tdap) vaccine  Z23 V06.1 diph,Pertuss,Acell,,Tet Vac-PF (ADACEL) 2 Lf-(2.5-5-3-5 mcg)-5Lf/0.5 mL susp sent to pharmacy. .Potential side effects were discussed. I prescribed the Tdap vaccine for health maintenance. This plan was reviewed with the patient and patient agrees. All questions were answered. This scribe documentation was reviewed by me and accurately reflects the examination and decisions made by me. This note will not be viewable in [address]

## 2020-08-07 DIAGNOSIS — E11.9 TYPE 2 DIABETES MELLITUS WITHOUT COMPLICATION, WITHOUT LONG-TERM CURRENT USE OF INSULIN (HCC): Primary | ICD-10-CM

## 2020-08-07 LAB
25(OH)D3+25(OH)D2 SERPL-MCNC: 48.8 NG/ML (ref 30–100)
ALBUMIN SERPL-MCNC: 4.7 G/DL (ref 4–5)
ALBUMIN/GLOB SERPL: 2 {RATIO} (ref 1.2–2.2)
ALP SERPL-CCNC: 53 IU/L (ref 39–117)
ALT SERPL-CCNC: 58 IU/L (ref 0–44)
AST SERPL-CCNC: 32 IU/L (ref 0–40)
BILIRUB SERPL-MCNC: 0.3 MG/DL (ref 0–1.2)
BUN SERPL-MCNC: 9 MG/DL (ref 6–24)
BUN/CREAT SERPL: 9 (ref 9–20)
CALCIUM SERPL-MCNC: 9.3 MG/DL (ref 8.7–10.2)
CHLORIDE SERPL-SCNC: 102 MMOL/L (ref 96–106)
CO2 SERPL-SCNC: 22 MMOL/L (ref 20–29)
CREAT SERPL-MCNC: 0.95 MG/DL (ref 0.76–1.27)
EST. AVERAGE GLUCOSE BLD GHB EST-MCNC: 183 MG/DL
GLOBULIN SER CALC-MCNC: 2.4 G/DL (ref 1.5–4.5)
GLUCOSE SERPL-MCNC: 144 MG/DL (ref 65–99)
HBA1C MFR BLD: 8 % (ref 4.8–5.6)
POTASSIUM SERPL-SCNC: 4.6 MMOL/L (ref 3.5–5.2)
PROT SERPL-MCNC: 7.1 G/DL (ref 6–8.5)
SODIUM SERPL-SCNC: 140 MMOL/L (ref 134–144)

## 2020-08-09 NOTE — PROGRESS NOTES
Maira Us, hope you are enjoying your weekend. Your HbA1C came back high 8.0. Have you started Januvia? Let me know how your readings are running?

## 2020-08-10 DIAGNOSIS — E11.9 TYPE 2 DIABETES MELLITUS WITHOUT COMPLICATION, WITHOUT LONG-TERM CURRENT USE OF INSULIN (HCC): Primary | ICD-10-CM

## 2020-08-10 RX ORDER — GLIMEPIRIDE 1 MG/1
1 TABLET ORAL 2 TIMES DAILY
Qty: 60 TAB | Refills: 0 | Status: SHIPPED | OUTPATIENT
Start: 2020-08-10 | End: 2020-09-03

## 2020-08-12 ENCOUNTER — DOCUMENTATION ONLY (OUTPATIENT)
Dept: PRIMARY CARE CLINIC | Age: 45
End: 2020-08-12

## 2020-08-14 DIAGNOSIS — R05.8 COUGH DUE TO ACE INHIBITOR: ICD-10-CM

## 2020-08-14 DIAGNOSIS — I10 ESSENTIAL HYPERTENSION: ICD-10-CM

## 2020-08-14 DIAGNOSIS — T46.4X5A COUGH DUE TO ACE INHIBITOR: ICD-10-CM

## 2020-08-18 RX ORDER — LOSARTAN POTASSIUM 25 MG/1
TABLET ORAL
Qty: 90 TAB | Refills: 0 | Status: SHIPPED | OUTPATIENT
Start: 2020-08-18 | End: 2021-01-12 | Stop reason: ALTCHOICE

## 2020-08-24 DIAGNOSIS — F41.9 ANXIETY: ICD-10-CM

## 2020-08-24 RX ORDER — BUSPIRONE HYDROCHLORIDE 10 MG/1
TABLET ORAL
Qty: 60 TAB | Refills: 0 | Status: SHIPPED | OUTPATIENT
Start: 2020-08-24 | End: 2020-09-17 | Stop reason: SDUPTHER

## 2020-09-03 DIAGNOSIS — E11.9 TYPE 2 DIABETES MELLITUS WITHOUT COMPLICATION, WITHOUT LONG-TERM CURRENT USE OF INSULIN (HCC): ICD-10-CM

## 2020-09-03 RX ORDER — GLIMEPIRIDE 1 MG/1
TABLET ORAL
Qty: 60 TAB | Refills: 0 | Status: SHIPPED | OUTPATIENT
Start: 2020-09-03 | End: 2021-10-05 | Stop reason: ALTCHOICE

## 2020-09-09 DIAGNOSIS — E11.9 TYPE 2 DIABETES MELLITUS WITHOUT COMPLICATION, WITHOUT LONG-TERM CURRENT USE OF INSULIN (HCC): ICD-10-CM

## 2020-09-09 DIAGNOSIS — Z23 NEED FOR DIPHTHERIA-TETANUS-PERTUSSIS (TDAP) VACCINE: Primary | ICD-10-CM

## 2020-09-17 DIAGNOSIS — F41.9 ANXIETY: ICD-10-CM

## 2020-09-17 RX ORDER — BUSPIRONE HYDROCHLORIDE 10 MG/1
10 TABLET ORAL 2 TIMES DAILY
Qty: 180 TAB | Refills: 0 | Status: SHIPPED | OUTPATIENT
Start: 2020-09-17 | End: 2020-12-15

## 2020-09-21 NOTE — TELEPHONE ENCOUNTER
Call placed to patient to verify if he is currently taking glipizide. No answer.    Left voice message to return call

## 2020-09-28 ENCOUNTER — TELEPHONE (OUTPATIENT)
Dept: PRIMARY CARE CLINIC | Age: 45
End: 2020-09-28

## 2020-09-28 NOTE — TELEPHONE ENCOUNTER
Patient wants a new prescription for prava 10 mg, would like alternative of lovas 10 mg or simva 5 mg

## 2020-09-28 NOTE — TELEPHONE ENCOUNTER
Call placed to patient regarding mediation refill request received for glimerpiride. To inform him that per Dr Cezar Logan he is to stop taking it. No answer. Left voice message to return call to office. Notified pharmacy to remove from patient profile.

## 2020-09-29 ENCOUNTER — E-VISIT (OUTPATIENT)
Dept: PRIMARY CARE CLINIC | Age: 45
End: 2020-09-29

## 2020-09-29 ENCOUNTER — VIRTUAL VISIT (OUTPATIENT)
Dept: PRIMARY CARE CLINIC | Age: 45
End: 2020-09-29
Payer: COMMERCIAL

## 2020-09-29 DIAGNOSIS — J01.01 ACUTE RECURRENT MAXILLARY SINUSITIS: Primary | ICD-10-CM

## 2020-09-29 DIAGNOSIS — E11.9 TYPE 2 DIABETES MELLITUS WITHOUT COMPLICATION, WITHOUT LONG-TERM CURRENT USE OF INSULIN (HCC): ICD-10-CM

## 2020-09-29 DIAGNOSIS — J34.89 SINUS PRESSURE: ICD-10-CM

## 2020-09-29 DIAGNOSIS — E78.2 MIXED HYPERLIPIDEMIA: ICD-10-CM

## 2020-09-29 DIAGNOSIS — J02.9 SORE THROAT: ICD-10-CM

## 2020-09-29 DIAGNOSIS — F41.9 ANXIETY: ICD-10-CM

## 2020-09-29 PROCEDURE — 99213 OFFICE O/P EST LOW 20 MIN: CPT | Performed by: INTERNAL MEDICINE

## 2020-09-29 RX ORDER — PRAVASTATIN SODIUM 10 MG/1
TABLET ORAL
Qty: 90 TAB | Refills: 0 | Status: SHIPPED | OUTPATIENT
Start: 2020-09-29 | End: 2020-12-21

## 2020-09-29 RX ORDER — AMOXICILLIN AND CLAVULANATE POTASSIUM 875; 125 MG/1; MG/1
1 TABLET, FILM COATED ORAL EVERY 12 HOURS
Qty: 14 TAB | Refills: 0 | Status: SHIPPED | OUTPATIENT
Start: 2020-09-29 | End: 2020-10-06

## 2020-09-29 RX ORDER — METHYLPREDNISOLONE 4 MG/1
TABLET ORAL
Qty: 1 DOSE PACK | Refills: 0 | Status: SHIPPED | OUTPATIENT
Start: 2020-09-29 | End: 2020-11-30 | Stop reason: ALTCHOICE

## 2020-09-29 NOTE — PROGRESS NOTES
Written by Rodriguez Barajas, as dictated by Dr. Nicol Hoover MD.    Genie Garrison is a 54 y.o. male. HPI  I was in the office while conducting this encounter. Consent:  He and/or his healthcare decision maker is aware that this patient-initiated Telehealth encounter is a billable service, with coverage as determined by his insurance carrier. He is aware that he may receive a bill and has provided verbal consent to proceed: Yes    This virtual visit was conducted via [address] Pursuant to the emergency declaration under the [address] [facility] [address]5 waiver authority and the [employer] and [employer] Act, this Virtual  Visit was conducted to reduce the patient's risk of exposure to COVID-19 and provide continuity of care for an established patient. Services were provided through a video synchronous discussion virtually to substitute for in-person clinic visit. Due to this being a TeleHealth evaluation, many elements of the physical examination are unable to be assessed. Pt presents virtually today to discuss sinus pain and congestion x4 days. He has been taking DayQuil and Mucinex for this, but he has been getting worse and starting to have a sore throat and some chest congestion. He has Flonase at home but has not been using it. He has been having high BS recently but has been starting to watch what he eats again and has noticed that it  Is back under control from that. Continues on Jardiance. He is taking losartan and his BP has been stable. Doing well on  Buspar.      Patient Active Problem List   Diagnosis Code    Essential hypertension I10    Type 2 diabetes mellitus without complication ([address] E82.3    Morbid obesity ([address] E66.01    Renal calculus, left N20.0        Current Outpatient Medications on File Prior to Visit   Medication Sig Dispense Refill    busPIRone (BUSPAR) 10 mg tablet Take 1 Tab by mouth two (2) times a day for 90 days. 180 Tab 0    glucose blood VI test strips (ASCENSIA AUTODISC VI, ONE TOUCH ULTRA TEST VI) strip Check blood sugar twice a day 100 Strip 0    glimepiride (AMARYL) 1 mg tablet TAKE 1 TABLET BY MOUTH TWO (2) TIMES A DAY FOR 30 DAYS. 60 Tab 0    losartan (COZAAR) 25 mg tablet TAKE 1 TABLET BY MOUTH EVERY DAY 90 Tab 0    losartan (COZAAR) 50 mg tablet Take 1 Tab by mouth daily for 90 days. 90 Tab 0    empagliflozin (JARDIANCE) 10 mg tablet Take 1 Tab by mouth daily for 90 days. 90 Tab 0    metFORMIN (GLUCOPHAGE) 850 mg tablet TAKE 1 TABLET BY MOUTH 2 TIMES DAILY WITH MEALS. 180 Tab 0    pravastatin (PRAVACHOL) 10 mg tablet TAKE 1 TABLET BY MOUTH EVERY DAY AT NIGHT 90 Tab 0    fluticasone propionate (FLONASE) 50 mcg/actuation nasal spray 1 spray each nostril daily 1 Bottle 0    ALPRAZolam (XANAX) 0.5 mg tablet Take 1 Tab by mouth nightly as needed for Anxiety for up to 5 doses. Max Daily Amount: 0.5 mg. 5 Tab 0     No current facility-administered medications on file prior to visit.         Allergies   Allergen Reactions    Wellbutrin [Bupropion Hcl] Unknown (comments)       Past Medical History:   Diagnosis Date    Diabetes ([facility]     Hypertension        Past Surgical History:   Procedure Laterality Date    HX TONSILLECTOMY  200       Family History   Problem Relation Age of Onset    Hypertension Mother     Headache Mother     Headache Sister     Diabetes Maternal Grandfather        Social History     Socioeconomic History    Marital status:      Spouse name: Not on file    Number of children: Not on file    Years of education: Not on file    Highest education level: Not on file   Occupational History    Not on file   Social Needs    Financial resource strain: Not on file    Food insecurity     Worry: Not on file     Inability: Not on file    Transportation needs     Medical: Not on file     Non-medical: Not on file   Tobacco Use  Smoking status: Never Smoker    Smokeless tobacco: Never Used   Substance and Sexual Activity    Alcohol use: No    Drug use: No    Sexual activity: Yes     Partners: Female   Lifestyle    Physical activity     Days per week: Not on file     Minutes per session: Not on file    Stress: Not on file   Relationships    Social connections     Talks on phone: Not on file     Gets together: Not on file     Attends Spiritism service: Not on file     Active member of club or organization: Not on file     Attends meetings of clubs or organizations: Not on file     Relationship status: Not on file    Intimate partner violence     Fear of current or ex partner: Not on file     Emotionally abused: Not on file     Physically abused: Not on file     Forced sexual activity: Not on file   Other Topics Concern    Not on file   Social History Narrative    Not on file       Office Visit on 08/06/2020   Component Date Value Ref Range Status    Glucose 08/06/2020 144* 65 - 99 mg/dL Final    BUN 08/06/2020 9  6 - 24 mg/dL Final    Creatinine 08/06/2020 0.95  0.76 - 1.27 mg/dL Final    GFR est non-AA 08/06/2020 96  >59 mL/min/1.73 Final    GFR est AA 08/06/2020 111  >59 mL/min/1.73 Final    BUN/Creatinine ratio 08/06/2020 9  9 - 20 Final    Sodium 08/06/2020 140  134 - 144 mmol/L Final    Potassium 08/06/2020 4.6  3.5 - 5.2 mmol/L Final    Chloride 08/06/2020 102  96 - 106 mmol/L Final    CO2 08/06/2020 22  20 - 29 mmol/L Final    Calcium 08/06/2020 9.3  8.7 - 10.2 mg/dL Final    Protein, total 08/06/2020 7.1  6.0 - 8.5 g/dL Final    Albumin 08/06/2020 4.7  4.0 - 5.0 g/dL Final    GLOBULIN, TOTAL 08/06/2020 2.4  1.5 - 4.5 g/dL Final    A-G Ratio 08/06/2020 2.0  1.2 - 2.2 Final    Bilirubin, total 08/06/2020 0.3  0.0 - 1.2 mg/dL Final    Alk.  phosphatase 08/06/2020 53  39 - 117 IU/L Final    AST (SGOT) 08/06/2020 32  0 - 40 IU/L Final    ALT (SGPT) 08/06/2020 58* 0 - 44 IU/L Final    Hemoglobin A1c 08/06/2020 8.0* 4.8 - 5.6 % Final    Comment:          Prediabetes: 5.7 - 6.4           Diabetes: >6.4           Glycemic control for adults with diabetes: <7.0      Estimated average glucose 08/06/2020 183  mg/dL Final    VITAMIN D, 25-HYDROXY 08/06/2020 48.8  30.0 - 100.0 ng/mL Final    Comment: Vitamin D deficiency has been defined by the [facility] practice guideline as a  level of serum 25-OH vitamin D less than 20 ng/mL (1,2). The Endocrine Society went on to further define vitamin D  insufficiency as a level between 21 and 29 ng/mL (2). 1. IOM ([place] of Medicine). 2010. Dietary reference     intakes for calcium and D. [facility]: The     [employer]. 2. Griselda MF, Carlene BOWDEN, Dread VAZQUEZ, et al.     Evaluation, treatment, and prevention of vitamin D     deficiency: an Endocrine Society clinical practice     guideline. JCEM. 2011 Jul; 96(7):1911-30.  ALBUMIN, URINE POC 08/06/2020 80  Negative mg/L Final    CREATININE, URINE POC 08/06/2020 300  mg/dL Final    Microalbumin/creat ratio (POC) 08/06/2020   <30 MG/G Final     Review of Systems   Constitutional: Negative for malaise/fatigue and weight loss. HENT: Positive for congestion, sinus pain and sore throat. Eyes: Negative for blurred vision. Respiratory: Negative for cough and shortness of breath. Cardiovascular: Negative for chest pain and leg swelling. Gastrointestinal: Negative for constipation and heartburn. Genitourinary: Negative for frequency and urgency. Musculoskeletal: Negative for back pain, joint pain and myalgias. Neurological: Negative for dizziness and headaches. Psychiatric/Behavioral: Negative for depression. The patient is not nervous/anxious and does not have insomnia. There were no vitals taken for this visit. Physical Exam  Nursing note reviewed. Constitutional:       General: He is not in acute distress.      Appearance: Normal appearance. He is not diaphoretic. HENT:      Head: Normocephalic and atraumatic. Right Ear: Hearing normal.      Left Ear: Hearing normal.      Nose: No congestion. Eyes:      General:         Right eye: No discharge. Left eye: No discharge. Conjunctiva/sclera: Conjunctivae normal.   Neck:      Musculoskeletal: Normal range of motion. Pulmonary:      Effort: Pulmonary effort is normal. No respiratory distress. Breath sounds: Normal air entry. Neurological:      Mental Status: He is alert and oriented to person, place, and time. Mental status is at baseline. Psychiatric:         Mood and Affect: Mood normal.         Behavior: Behavior normal.         Thought Content: Thought content normal.       ASSESSMENT and PLAN    ICD-10-CM ICD-9-CM    1. Acute recurrent maxillary sinusitis  J01.01 461.0 amoxicillin-clavulanate (AUGMENTIN) 875-125 mg per tablet sent to pharmacy. Potential side effects were discussed. methylPREDNISolone (MEDROL DOSEPACK) 4 mg tablet sent to pharmacy. Potential side effects were discussed. I prescribed a Medrol dose pack and instructed him to take it first. If after the first day he does not see improvement, he should take the Augmentin. 2. Sore throat  J02.9 462 Likely related to sinus infection. Will monitor for changes or improvements. 3. Sinus pressure  J34.89 478.19 methylPREDNISolone (MEDROL DOSEPACK) 4 mg tablet sent to pharmacy. I prescribed a Medrol dose pack. 4. Type 2 diabetes mellitus without complication, without long-term current use of insulin (HCC)  E11.9 250.00 Diabetes is well-controlled on current medication. No change to dosage at this time. 5. Anxiety  F41.9 300.00 Continue on same dose on Buspar. This plan was reviewed with the patient and patient agrees. All questions were answered.     This scribe documentation was reviewed by me and accurately reflects the examination and decisions made by me.    This note will not be viewable in MyChart.

## 2020-10-04 DIAGNOSIS — R05.9 COUGH: Primary | ICD-10-CM

## 2020-10-04 RX ORDER — BENZONATATE 200 MG/1
200 CAPSULE ORAL
Qty: 21 CAP | Refills: 0 | Status: SHIPPED | OUTPATIENT
Start: 2020-10-04 | End: 2020-10-11

## 2020-10-08 DIAGNOSIS — E78.2 MIXED HYPERLIPIDEMIA: Primary | ICD-10-CM

## 2020-10-08 RX ORDER — LOVASTATIN 10 MG/1
10 TABLET ORAL
Qty: 90 TAB | Refills: 0 | Status: SHIPPED | OUTPATIENT
Start: 2020-10-08 | End: 2020-12-21 | Stop reason: ALTCHOICE

## 2020-10-08 NOTE — TELEPHONE ENCOUNTER
Patient states that the pravastatin is $35 and lovastatin will be $0 please send new script  Last office visit 9/29/2020  Last refill 9/29/2020

## 2020-10-11 DIAGNOSIS — E11.9 TYPE 2 DIABETES MELLITUS WITHOUT COMPLICATION, WITHOUT LONG-TERM CURRENT USE OF INSULIN (HCC): ICD-10-CM

## 2020-10-11 RX ORDER — BLOOD SUGAR DIAGNOSTIC
STRIP MISCELLANEOUS
Qty: 50 STRIP | Refills: 1 | Status: SHIPPED | OUTPATIENT
Start: 2020-10-11 | End: 2021-03-08 | Stop reason: SDUPTHER

## 2020-10-27 DIAGNOSIS — I10 ESSENTIAL HYPERTENSION: ICD-10-CM

## 2020-10-27 DIAGNOSIS — E11.9 TYPE 2 DIABETES MELLITUS WITHOUT COMPLICATION, WITHOUT LONG-TERM CURRENT USE OF INSULIN (HCC): ICD-10-CM

## 2020-10-28 RX ORDER — LOSARTAN POTASSIUM 50 MG/1
TABLET ORAL
Qty: 90 TAB | Refills: 0 | Status: SHIPPED | OUTPATIENT
Start: 2020-10-28 | End: 2021-03-08 | Stop reason: SDUPTHER

## 2020-10-28 RX ORDER — METFORMIN HYDROCHLORIDE 850 MG/1
TABLET ORAL
Qty: 180 TAB | Refills: 0 | Status: SHIPPED | OUTPATIENT
Start: 2020-10-28 | End: 2021-01-27

## 2020-11-30 DIAGNOSIS — I10 ESSENTIAL HYPERTENSION: Primary | ICD-10-CM

## 2020-12-02 ENCOUNTER — TELEPHONE (OUTPATIENT)
Dept: PRIMARY CARE CLINIC | Age: 45
End: 2020-12-02

## 2020-12-04 ENCOUNTER — TELEPHONE (OUTPATIENT)
Dept: PRIMARY CARE CLINIC | Age: 45
End: 2020-12-04

## 2020-12-04 NOTE — TELEPHONE ENCOUNTER
Maxine Kapadia from [facility] called wanting to confirm Patient's Diabetes diagnosis, she asked for a call back at [phone] using reference number [id number].

## 2020-12-04 NOTE — TELEPHONE ENCOUNTER
Returned call to ins. Confirmed dx for jardiance.    Was informed that medication has been approved til 12/1/2021

## 2020-12-15 DIAGNOSIS — F41.9 ANXIETY: ICD-10-CM

## 2020-12-15 RX ORDER — BUSPIRONE HYDROCHLORIDE 10 MG/1
10 TABLET ORAL 2 TIMES DAILY
Qty: 60 TAB | Refills: 2 | Status: SHIPPED | OUTPATIENT
Start: 2020-12-15 | End: 2021-01-07 | Stop reason: SDUPTHER

## 2020-12-21 DIAGNOSIS — E78.2 MIXED HYPERLIPIDEMIA: ICD-10-CM

## 2020-12-21 RX ORDER — PRAVASTATIN SODIUM 10 MG/1
TABLET ORAL
Qty: 90 TAB | Refills: 0 | Status: SHIPPED | OUTPATIENT
Start: 2020-12-21 | End: 2022-03-29

## 2021-01-07 DIAGNOSIS — F41.9 ANXIETY: ICD-10-CM

## 2021-01-08 RX ORDER — BUSPIRONE HYDROCHLORIDE 10 MG/1
10 TABLET ORAL 2 TIMES DAILY
Qty: 60 TAB | Refills: 2 | Status: SHIPPED | OUTPATIENT
Start: 2021-01-08 | End: 2021-01-12 | Stop reason: DRUGHIGH

## 2021-01-12 ENCOUNTER — VIRTUAL VISIT (OUTPATIENT)
Dept: PRIMARY CARE CLINIC | Age: 46
End: 2021-01-12
Payer: COMMERCIAL

## 2021-01-12 DIAGNOSIS — F41.9 ANXIETY: ICD-10-CM

## 2021-01-12 DIAGNOSIS — F41.0 PANIC ATTACKS: Primary | ICD-10-CM

## 2021-01-12 DIAGNOSIS — E11.9 TYPE 2 DIABETES MELLITUS WITHOUT COMPLICATION, WITHOUT LONG-TERM CURRENT USE OF INSULIN (HCC): ICD-10-CM

## 2021-01-12 DIAGNOSIS — F32.A ANXIETY AND DEPRESSION: ICD-10-CM

## 2021-01-12 DIAGNOSIS — F41.9 ANXIETY AND DEPRESSION: ICD-10-CM

## 2021-01-12 DIAGNOSIS — E66.01 MORBID OBESITY (HCC): ICD-10-CM

## 2021-01-12 DIAGNOSIS — I10 ESSENTIAL HYPERTENSION: ICD-10-CM

## 2021-01-12 PROCEDURE — 99213 OFFICE O/P EST LOW 20 MIN: CPT | Performed by: INTERNAL MEDICINE

## 2021-01-12 RX ORDER — BUSPIRONE HYDROCHLORIDE 15 MG/1
15 TABLET ORAL 2 TIMES DAILY
Qty: 60 TAB | Refills: 0 | Status: SHIPPED | OUTPATIENT
Start: 2021-01-12 | End: 2021-02-03 | Stop reason: SDUPTHER

## 2021-01-12 RX ORDER — ALPRAZOLAM 0.5 MG/1
0.5 TABLET ORAL
Qty: 10 TAB | Refills: 0 | Status: SHIPPED | OUTPATIENT
Start: 2021-01-12 | End: 2021-10-05 | Stop reason: ALTCHOICE

## 2021-01-12 RX ORDER — BUSPIRONE HYDROCHLORIDE 10 MG/1
10 TABLET ORAL 2 TIMES DAILY
Qty: 60 TAB | Refills: 2 | Status: CANCELLED | OUTPATIENT
Start: 2021-01-12 | End: 2021-04-12

## 2021-01-12 NOTE — PROGRESS NOTES
Nida Juarez (: 1975) is a 54 y.o. male, established patient, here for evaluation of the following chief complaint(s):   No chief complaint on file. Written by Shelby Mccann, as dictated by Dr. Maged Mancini MD.    SUBJECTIVE/OBJECTIVE:  HPI  Pt presents virtually today to discuss anxiety from his job. He has been having a lot of stress at work and it is leading to anxiety manifested through nausea, insomnia, and panic attacks. He notes that he has  Constant underlying feeling of anxiety. He is currently taking Buspar 10 mg BID and feels like it helps some but not enough. He notes that although work is stressful his life at home is going well. He has lost weight recently which he attributes to loss of appetite from his stress. However, he also has started some healthy habits like drinking less soda and more water. He continues on metformin 850 mg BID and Jardiance 10 mg every day, and his BS this morning was 123. He has not been checking his BP regularly. Patient Active Problem List   Diagnosis Code    Essential hypertension I10    Type 2 diabetes mellitus without complication ([facility] Q16.0    Morbid obesity ([facility] E66.01    Renal calculus, left N20.0        Current Outpatient Medications on File Prior to Visit   Medication Sig Dispense Refill    [DISCONTINUED] busPIRone (BUSPAR) 10 mg tablet Take 1 Tab by mouth two (2) times a day for 90 days. 60 Tab 2    pravastatin (PRAVACHOL) 10 mg tablet TAKE 1 TABLET BY MOUTH EVERY DAY AT NIGHT 90 Tab 0    empagliflozin (JARDIANCE) 10 mg tablet Take 1 Tab by mouth daily for 90 days.  90 Tab 0    losartan (COZAAR) 50 mg tablet TAKE 1 TABLET BY MOUTH EVERY DAY 90 Tab 0    metFORMIN (GLUCOPHAGE) 850 mg tablet TAKE 1 TABLET BY MOUTH TWICE A DAY WITH MEALS 180 Tab 0    glucose blood VI test strips (OneTouch Verio test strips) strip USE TO CHECK BLOOD SUGAR TWICE A DAY 50 Strip 1    glimepiride (AMARYL) 1 mg tablet TAKE 1 TABLET BY MOUTH TWO (2) TIMES A DAY FOR 30 DAYS. 60 Tab 0    [DISCONTINUED] losartan (COZAAR) 25 mg tablet TAKE 1 TABLET BY MOUTH EVERY DAY 90 Tab 0    fluticasone propionate (FLONASE) 50 mcg/actuation nasal spray 1 spray each nostril daily 1 Bottle 0    [DISCONTINUED] ALPRAZolam (XANAX) 0.5 mg tablet Take 1 Tab by mouth nightly as needed for Anxiety for up to 5 doses. Max Daily Amount: 0.5 mg. 5 Tab 0     No current facility-administered medications on file prior to visit.         Allergies   Allergen Reactions    Wellbutrin [Bupropion Hcl] Unknown (comments)       Past Medical History:   Diagnosis Date    Diabetes ([facility] [address]     Hypertension        Past Surgical History:   Procedure Laterality Date    HX TONSILLECTOMY  200       Family History   Problem Relation Age of Onset    Hypertension Mother     Headache Mother     Headache Sister     Diabetes Maternal Grandfather        Social History     Socioeconomic History    Marital status:      Spouse name: Not on file    Number of children: Not on file    Years of education: Not on file    Highest education level: Not on file   Occupational History    Not on file   Social Needs    Financial resource strain: Not on file    Food insecurity     Worry: Not on file     Inability: Not on file    Transportation needs     Medical: Not on file     Non-medical: Not on file   Tobacco Use    Smoking status: Never Smoker    Smokeless tobacco: Never Used   Substance and Sexual Activity    Alcohol use: No    Drug use: No    Sexual activity: Yes     Partners: Female   Lifestyle    Physical activity     Days per week: Not on file     Minutes per session: Not on file    Stress: Not on file   Relationships    Social connections     Talks on phone: Not on file     Gets together: Not on file     Attends Episcopalian service: Not on file     Active member of club or organization: Not on file     Attends meetings of clubs or organizations: Not on file     Relationship status: Not on file    Intimate partner violence     Fear of current or ex partner: Not on file     Emotionally abused: Not on file     Physically abused: Not on file     Forced sexual activity: Not on file   Other Topics Concern    Not on file   Social History Narrative    Not on file       No visits with results within 3 Month(s) from this visit. Latest known visit with results is:   Office Visit on 08/06/2020   Component Date Value Ref Range Status    Glucose 08/06/2020 144* 65 - 99 mg/dL Final    BUN 08/06/2020 9  6 - 24 mg/dL Final    Creatinine 08/06/2020 0.95  0.76 - 1.27 mg/dL Final    GFR est non-AA 08/06/2020 96  >59 mL/min/1.73 Final    GFR est AA 08/06/2020 111  >59 mL/min/1.73 Final    BUN/Creatinine ratio 08/06/2020 9  9 - 20 Final    Sodium 08/06/2020 140  134 - 144 mmol/L Final    Potassium 08/06/2020 4.6  3.5 - 5.2 mmol/L Final    Chloride 08/06/2020 102  96 - 106 mmol/L Final    CO2 08/06/2020 22  20 - 29 mmol/L Final    Calcium 08/06/2020 9.3  8.7 - 10.2 mg/dL Final    Protein, total 08/06/2020 7.1  6.0 - 8.5 g/dL Final    Albumin 08/06/2020 4.7  4.0 - 5.0 g/dL Final    GLOBULIN, TOTAL 08/06/2020 2.4  1.5 - 4.5 g/dL Final    A-G Ratio 08/06/2020 2.0  1.2 - 2.2 Final    Bilirubin, total 08/06/2020 0.3  0.0 - 1.2 mg/dL Final    Alk. phosphatase 08/06/2020 53  39 - 117 IU/L Final    AST (SGOT) 08/06/2020 32  0 - 40 IU/L Final    ALT (SGPT) 08/06/2020 58* 0 - 44 IU/L Final    Hemoglobin A1c 08/06/2020 8.0* 4.8 - 5.6 % Final    Comment:          Prediabetes: 5.7 - 6.4           Diabetes: >6.4           Glycemic control for adults with diabetes: <7.0      Estimated average glucose 08/06/2020 183  mg/dL Final    VITAMIN D, 25-HYDROXY 08/06/2020 48.8  30.0 - 100.0 ng/mL Final    Comment: Vitamin D deficiency has been defined by the [place] of  Medicine and an Endocrine Society practice guideline as a  level of serum 25-OH vitamin D less than 20 ng/mL (1,2).   The Endocrine Society went on to further define vitamin D  insufficiency as a level between 21 and 29 ng/mL (2). 1. IOM ([place] of Medicine). 2010. Dietary reference     intakes for calcium and D. [facility]: [place]. 2. Griselda MF, Carlene NC, Dread VAZQUEZ, et al.     Evaluation, treatment, and prevention of vitamin D     deficiency: an Endocrine Society clinical practice     guideline. JCEM. 2011 Jul; 96(7):1911-30.  ALBUMIN, URINE POC 08/06/2020 80  Negative mg/L Final    CREATININE, URINE POC 08/06/2020 300  mg/dL Final    Microalbumin/creat ratio (POC) 08/06/2020   <30 MG/G Final     Review of Systems   Constitutional: Positive for appetite change and unexpected weight change (weight loss). Negative for activity change and fatigue. HENT: Negative for congestion, ear discharge, ear pain, hearing loss, rhinorrhea and sore throat. Eyes: Negative for pain, discharge and redness. Respiratory: Negative for cough, chest tightness and shortness of breath. Cardiovascular: Negative for leg swelling. Gastrointestinal: Negative for abdominal pain, constipation and diarrhea. Endocrine: Negative for polyuria. Genitourinary: Negative for dysuria, flank pain and urgency. Musculoskeletal: Negative for arthralgias, back pain and myalgias. Skin: Negative for color change. Neurological: Negative for dizziness, light-headedness and headaches. Psychiatric/Behavioral: Positive for sleep disturbance. Negative for dysphoric mood. The patient is nervous/anxious. +panic attacks        Patient-Reported Vitals 1/12/2021   Patient-Reported Weight 328lbs       Physical Exam  Constitutional:       General: He is not in acute distress. Appearance: Normal appearance. He is well-developed. He is not diaphoretic. HENT:      Head: Normocephalic and atraumatic. Eyes:      General: No scleral icterus. Right eye: No discharge. Left eye: No discharge. Extraocular Movements: Extraocular movements intact. Conjunctiva/sclera: Conjunctivae normal.      Pupils: Pupils are equal, round, and reactive to light. Pulmonary:      Effort: Pulmonary effort is normal.      Breath sounds: Normal breath sounds. No wheezing. Neurological:      Mental Status: He is alert and oriented to person, place, and time. Psychiatric:         Mood and Affect: Mood and affect normal.         [INSTRUCTIONS:  \"[x]\" Indicates a positive item  \"[]\" Indicates a negative item  -- DELETE ALL ITEMS NOT EXAMINED]    Constitutional: [x] Appears well-developed and well-nourished [x] No apparent distress      [] Abnormal -     Mental status: [x] Alert and awake  [x] Oriented to person/place/time [x] Able to follow commands    [] Abnormal -     Eyes:   EOM    [x]  Normal    [] Abnormal -   Sclera  [x]  Normal    [] Abnormal -          Discharge [x]  None visible   [] Abnormal -     HENT: [x] Normocephalic, atraumatic  [] Abnormal -   [x] Mouth/Throat: Mucous membranes are moist    External Ears [x] Normal  [] Abnormal -    Neck: [x] No visualized mass [] Abnormal -     Pulmonary/Chest: [x] Respiratory effort normal   [x] No visualized signs of difficulty breathing or respiratory distress        [] Abnormal -      Musculoskeletal:   [x] Normal gait with no signs of ataxia         [x] Normal range of motion of neck        [] Abnormal -     Neurological:        [x] No Facial Asymmetry (Cranial nerve 7 motor function) (limited exam due to video visit)          [x] No gaze palsy        [] Abnormal -          Skin:        [x] No significant exanthematous lesions or discoloration noted on facial skin         [] Abnormal -            Psychiatric:       [x] Normal Affect [] Abnormal -        [x] No Hallucinations    Other pertinent observable physical exam findings:-      ASSESSMENT/PLAN:  1. Panic attacks  -     ALPRAZolam (XANAX) 0.5 mg tablet;  Take 1 Tab by mouth nightly as needed for Anxiety for up to 10 doses. Max Daily Amount: 0.5 mg., Normal, Disp-10 Tab, R-0 sent to pharmacy. Potential side effects were discussed. I prescribed Xanax and instructed him to take it prn for severe panic attacks. 2. Anxiety and depression  -     busPIRone (BUSPAR) 15 mg tablet; Take 1 Tab by mouth two (2) times a day for 30 days. , Normal, Disp-60 Tab, R-0 sent to pharmacy. Potential side effects were discussed. I increased his Buspar from 10 mg BID to 15 mg BID. 3. Type 2 diabetes mellitus without complication, without long-term current use of insulin ([address]  Continues on metformin and Jardiance every day. His BS is 123 today. 4. Essential hypertension  He continues on losartan 50 mg every day and has not been checking his BP at home. This plan was reviewed with the patient and patient agrees. All questions were answered. This scribe documentation was reviewed by me and accurately reflects the examination and decisions made by me. Edmar Palma is being evaluated by a Virtual Visit (video visit) encounter to address concerns as mentioned above. . Due to this being a TeleHealth encounter (During [facility] public health emergency), evaluation of the following organ systems was limited: Vitals/Constitutional/EENT/Resp/CV/GI//MS/Neuro/Skin/Heme-Lymph-Imm. Pursuant to the emergency declaration under the [address] [place] [address], [address] [place] [address] and the [employer] and [employer] Act, this Virtual Visit was conducted with patient's (and/or legal guardian's) consent, to reduce the patient's risk of exposure to COVID-19 and provide necessary medical care. The patient (and/or legal guardian) has also been advised to contact this office for worsening conditions or problems, and seek emergency medical treatment and/or call 911 if deemed necessary.     Patient identification was verified at the start of the visit: YES    Services were provided through a video synchronous discussion virtually to substitute for in-person clinic visit. Patient was located at home and provider was located in office . An electronic signature was used to authenticate this note.   -- Javon Garcia

## 2021-01-27 DIAGNOSIS — E11.9 TYPE 2 DIABETES MELLITUS WITHOUT COMPLICATION, WITHOUT LONG-TERM CURRENT USE OF INSULIN (HCC): ICD-10-CM

## 2021-01-27 RX ORDER — METFORMIN HYDROCHLORIDE 850 MG/1
TABLET ORAL
Qty: 180 TAB | Refills: 0 | Status: SHIPPED | OUTPATIENT
Start: 2021-01-27 | End: 2021-05-03

## 2021-02-03 DIAGNOSIS — F41.9 ANXIETY AND DEPRESSION: ICD-10-CM

## 2021-02-03 DIAGNOSIS — F32.A ANXIETY AND DEPRESSION: ICD-10-CM

## 2021-02-03 RX ORDER — BUSPIRONE HYDROCHLORIDE 15 MG/1
15 TABLET ORAL 2 TIMES DAILY
Qty: 180 TAB | Refills: 0 | Status: SHIPPED | OUTPATIENT
Start: 2021-02-03 | End: 2021-05-03 | Stop reason: SDUPTHER

## 2021-02-26 ENCOUNTER — VIRTUAL VISIT (OUTPATIENT)
Dept: PRIMARY CARE CLINIC | Age: 46
End: 2021-02-26
Payer: COMMERCIAL

## 2021-02-26 DIAGNOSIS — F41.0 ANXIETY ATTACK: Primary | ICD-10-CM

## 2021-02-26 DIAGNOSIS — E11.9 TYPE 2 DIABETES MELLITUS WITHOUT COMPLICATION, WITHOUT LONG-TERM CURRENT USE OF INSULIN (HCC): ICD-10-CM

## 2021-02-26 DIAGNOSIS — F32.9 REACTIVE DEPRESSION: ICD-10-CM

## 2021-02-26 PROCEDURE — 99213 OFFICE O/P EST LOW 20 MIN: CPT | Performed by: INTERNAL MEDICINE

## 2021-02-26 RX ORDER — SERTRALINE HYDROCHLORIDE 25 MG/1
25 TABLET, FILM COATED ORAL DAILY
Qty: 30 TAB | Refills: 0 | Status: SHIPPED | OUTPATIENT
Start: 2021-02-26 | End: 2021-03-08 | Stop reason: SDUPTHER

## 2021-02-26 NOTE — PROGRESS NOTES
Hailey Melo (: 1975) is a 54 y.o. male, established patient, here for evaluation of the following chief complaint(s): Anxiety    Written by Ronak Randhawa, as dictated by Dr. Kaylie Mcrae MD.    ASSESSMENT/PLAN:  1. Anxiety attack  I instructed him to go on [employer] to see if he can talk to a psychologist sooner. He should still keep his appts with his usual therapist since he works well with them, but really he should be seeing someone sooner than the second or third week of March. 2. Reactive depression  -     sertraline (ZOLOFT) 25 mg tablet; Take 1 Tab by mouth daily for 30 days. Sent to pharmacy. Potential side effects were discussed. I prescribed Zoloft and instructed him to start taking it every day. If after a week he is not seeing enough benefits he can increase to taking 2 tablets every day. 3. Type 2 diabetes mellitus without complication, without long-term current use of insulin (HCC)  Pt continues on current medications. I instructed him to start checking his BS. SUBJECTIVE/OBJECTIVE:  HPI  Pt presents virtually today to discuss anxiety. He is having trouble at work with his company having recently been bought out. He is waking up in sheer terror, and he is always thinking about the worst case scenario for what may happen. He feels that Buspar may be helping potentially, but definitely not as much as he needs. He has scheduled some sessions with a therapist he saw previously and enjoys working with but cannot get an appt until the second or third week or March. He has not really been using his Xanax much. He would be interested in trying a new daily medication. He has not been checking his BS recently but he does continue on metformin 850 mg BID and Jardiance every day.     Patient Active Problem List   Diagnosis Code    Essential hypertension I10    Type 2 diabetes mellitus without complication ([address] O59.5    Morbid obesity ([address] E66.01    Renal calculus, left N20.0        Current Outpatient Medications on File Prior to Visit   Medication Sig Dispense Refill    busPIRone (BUSPAR) 15 mg tablet Take 1 Tab by mouth two (2) times a day for 90 days. 180 Tab 0    metFORMIN (GLUCOPHAGE) 850 mg tablet TAKE 1 TABLET BY MOUTH TWICE A DAY WITH MEALS 180 Tab 0    ALPRAZolam (XANAX) 0.5 mg tablet Take 1 Tab by mouth nightly as needed for Anxiety for up to 10 doses. Max Daily Amount: 0.5 mg. 10 Tab 0    pravastatin (PRAVACHOL) 10 mg tablet TAKE 1 TABLET BY MOUTH EVERY DAY AT NIGHT 90 Tab 0    empagliflozin (JARDIANCE) 10 mg tablet Take 1 Tab by mouth daily for 90 days. 90 Tab 0    losartan (COZAAR) 50 mg tablet TAKE 1 TABLET BY MOUTH EVERY DAY 90 Tab 0    glucose blood VI test strips (OneTouch Verio test strips) strip USE TO CHECK BLOOD SUGAR TWICE A DAY 50 Strip 1    glimepiride (AMARYL) 1 mg tablet TAKE 1 TABLET BY MOUTH TWO (2) TIMES A DAY FOR 30 DAYS. 60 Tab 0    fluticasone propionate (FLONASE) 50 mcg/actuation nasal spray 1 spray each nostril daily 1 Bottle 0     No current facility-administered medications on file prior to visit.         Allergies   Allergen Reactions    Wellbutrin [Bupropion Hcl] Unknown (comments)       Past Medical History:   Diagnosis Date    Diabetes ([facility]     Hypertension        Past Surgical History:   Procedure Laterality Date    HX TONSILLECTOMY  200       Family History   Problem Relation Age of Onset    Hypertension Mother     Headache Mother     Headache Sister     Diabetes Maternal Grandfather        Social History     Socioeconomic History    Marital status:      Spouse name: Not on file    Number of children: Not on file    Years of education: Not on file    Highest education level: Not on file   Occupational History    Not on file   Social Needs    Financial resource strain: Not on file    Food insecurity     Worry: Not on file     Inability: Not on file    Transportation needs     Medical: Not on file     Non-medical: Not on file   Tobacco Use    Smoking status: Never Smoker    Smokeless tobacco: Never Used   Substance and Sexual Activity    Alcohol use: No    Drug use: No    Sexual activity: Yes     Partners: Female   Lifestyle    Physical activity     Days per week: Not on file     Minutes per session: Not on file    Stress: Not on file   Relationships    Social connections     Talks on phone: Not on file     Gets together: Not on file     Attends Scientology service: Not on file     Active member of club or organization: Not on file     Attends meetings of clubs or organizations: Not on file     Relationship status: Not on file    Intimate partner violence     Fear of current or ex partner: Not on file     Emotionally abused: Not on file     Physically abused: Not on file     Forced sexual activity: Not on file   Other Topics Concern    Not on file   Social History Narrative    Not on file       No visits with results within 3 Month(s) from this visit. Latest known visit with results is:   Office Visit on 08/06/2020   Component Date Value Ref Range Status    Glucose 08/06/2020 144* 65 - 99 mg/dL Final    BUN 08/06/2020 9  6 - 24 mg/dL Final    Creatinine 08/06/2020 0.95  0.76 - 1.27 mg/dL Final    GFR est non-AA 08/06/2020 96  >59 mL/min/1.73 Final    GFR est AA 08/06/2020 111  >59 mL/min/1.73 Final    BUN/Creatinine ratio 08/06/2020 9  9 - 20 Final    Sodium 08/06/2020 140  134 - 144 mmol/L Final    Potassium 08/06/2020 4.6  3.5 - 5.2 mmol/L Final    Chloride 08/06/2020 102  96 - 106 mmol/L Final    CO2 08/06/2020 22  20 - 29 mmol/L Final    Calcium 08/06/2020 9.3  8.7 - 10.2 mg/dL Final    Protein, total 08/06/2020 7.1  6.0 - 8.5 g/dL Final    Albumin 08/06/2020 4.7  4.0 - 5.0 g/dL Final    GLOBULIN, TOTAL 08/06/2020 2.4  1.5 - 4.5 g/dL Final    A-G Ratio 08/06/2020 2.0  1.2 - 2.2 Final    Bilirubin, total 08/06/2020 0.3  0.0 - 1.2 mg/dL Final    Alk.  phosphatase 08/06/2020 53  39 - 117 IU/L Final    AST (SGOT) 08/06/2020 32  0 - 40 IU/L Final    ALT (SGPT) 08/06/2020 58* 0 - 44 IU/L Final    Hemoglobin A1c 08/06/2020 8.0* 4.8 - 5.6 % Final    Comment:          Prediabetes: 5.7 - 6.4           Diabetes: >6.4           Glycemic control for adults with diabetes: <7.0      Estimated average glucose 08/06/2020 183  mg/dL Final    VITAMIN D, 25-HYDROXY 08/06/2020 48.8  30.0 - 100.0 ng/mL Final    Comment: Vitamin D deficiency has been defined by the [place] of  Medicine and an Endocrine Society practice guideline as a  level of serum 25-OH vitamin D less than 20 ng/mL (1,2). The Endocrine Society went on to further define vitamin D  insufficiency as a level between 21 and 29 ng/mL (2). 1. IOM ([place] of [employer]). 2010. Dietary reference     intakes for calcium and D. [address] [place] [address]: The     [employer]. 2. Griselda MF, Carlene BOWDEN, Dread VAZQUEZ, et al.     Evaluation, treatment, and prevention of vitamin D     deficiency: an Endocrine Society clinical practice     guideline. JCEM. 2011 Jul; 96(7):1911-30.  ALBUMIN, URINE POC 08/06/2020 80  Negative mg/L Final    CREATININE, URINE POC 08/06/2020 300  mg/dL Final    Microalbumin/creat ratio (POC) 08/06/2020   <30 MG/G Final     Review of Systems   Constitutional: Negative for activity change, fatigue and unexpected weight change. HENT: Negative for congestion, ear discharge, ear pain, hearing loss, rhinorrhea and sore throat. Eyes: Negative for pain, discharge and redness. Respiratory: Negative for cough, chest tightness and shortness of breath. Cardiovascular: Negative for leg swelling. Gastrointestinal: Negative for abdominal pain, constipation and diarrhea. Endocrine: Negative for polyuria. Genitourinary: Negative for dysuria, flank pain and urgency. Musculoskeletal: Negative for arthralgias, back pain and myalgias. Skin: Negative for color change.    Neurological: Negative for dizziness, light-headedness and headaches. Psychiatric/Behavioral: Positive for dysphoric mood and sleep disturbance. The patient is nervous/anxious. Patient-Reported Vitals 1/12/2021   Patient-Reported Weight 328lbs       Physical Exam    [INSTRUCTIONS:  \"[x]\" Indicates a positive item  \"[]\" Indicates a negative item  -- DELETE ALL ITEMS NOT EXAMINED]    Constitutional: [x] Appears well-developed and well-nourished [x] No apparent distress      [] Abnormal -     Mental status: [x] Alert and awake  [x] Oriented to person/place/time [x] Able to follow commands    [] Abnormal -     Eyes:   EOM    [x]  Normal    [] Abnormal -   Sclera  [x]  Normal    [] Abnormal -          Discharge [x]  None visible   [] Abnormal -     HENT: [x] Normocephalic, atraumatic  [] Abnormal -   [x] Mouth/Throat: Mucous membranes are moist    External Ears [x] Normal  [] Abnormal -    Neck: [x] No visualized mass [] Abnormal -     Pulmonary/Chest: [x] Respiratory effort normal   [x] No visualized signs of difficulty breathing or respiratory distress        [] Abnormal -      Musculoskeletal:   [x] Normal gait with no signs of ataxia         [x] Normal range of motion of neck        [] Abnormal -     Neurological:        [x] No Facial Asymmetry (Cranial nerve 7 motor function) (limited exam due to video visit)          [x] No gaze palsy        [] Abnormal -          Skin:        [x] No significant exanthematous lesions or discoloration noted on facial skin         [] Abnormal -            Psychiatric:       [] Normal Affect [x] Abnormal - tearful, anxious affect       [x] No Hallucinations    Other pertinent observable physical exam findings:-    Rosa Pedersen is being evaluated by a Virtual Visit (video visit) encounter to address concerns as mentioned above.  Due to this being a TeleHealth encounter ([facility]), evaluation of the following organ systems was limited: Vitals/Constitutional/EENT/Resp/CV/GI//MS/Neuro/Skin/Heme-Lymph-Imm.  Pursuant to the emergency declaration under the [place] Act and the National Emergencies Act, 1135 waiver authority and the Coronavirus Preparedness and Response Supplemental Appropriations Act, this Virtual Visit was conducted with patient's (and/or legal guardian's) consent, to reduce the patient's risk of exposure to COVID-19 and provide necessary medical care.  The patient (and/or legal guardian) has also been advised to contact this office for worsening conditions or problems, and seek emergency medical treatment and/or call 911 if deemed necessary.    Patient identification was verified at the start of the visit: YES    Services were provided through a video synchronous discussion virtually to substitute for in-person clinic visit. Patient was located at home and provider was located in office.     An electronic signature was used to authenticate this note.  -- Fela Fregoso

## 2021-03-08 DIAGNOSIS — I10 ESSENTIAL HYPERTENSION: ICD-10-CM

## 2021-03-08 DIAGNOSIS — F32.9 REACTIVE DEPRESSION: ICD-10-CM

## 2021-03-08 DIAGNOSIS — E11.9 TYPE 2 DIABETES MELLITUS WITHOUT COMPLICATION, WITHOUT LONG-TERM CURRENT USE OF INSULIN (HCC): ICD-10-CM

## 2021-03-08 DIAGNOSIS — F32.9 REACTIVE DEPRESSION: Primary | ICD-10-CM

## 2021-03-08 RX ORDER — BLOOD SUGAR DIAGNOSTIC
STRIP MISCELLANEOUS
Qty: 50 STRIP | Refills: 1 | Status: SHIPPED | OUTPATIENT
Start: 2021-03-08 | End: 2022-05-17

## 2021-03-08 RX ORDER — LOSARTAN POTASSIUM 50 MG/1
TABLET ORAL
Qty: 90 TAB | Refills: 0 | Status: SHIPPED | OUTPATIENT
Start: 2021-03-08 | End: 2021-06-12

## 2021-03-08 RX ORDER — SERTRALINE HYDROCHLORIDE 100 MG/1
100 TABLET, FILM COATED ORAL DAILY
Qty: 30 TAB | Refills: 0 | Status: SHIPPED | OUTPATIENT
Start: 2021-03-08 | End: 2021-03-23 | Stop reason: SDUPTHER

## 2021-03-08 RX ORDER — SERTRALINE HYDROCHLORIDE 50 MG/1
50 TABLET, FILM COATED ORAL DAILY
Qty: 90 TAB | Refills: 0 | Status: SHIPPED | OUTPATIENT
Start: 2021-03-08 | End: 2021-03-08 | Stop reason: ALTCHOICE

## 2021-03-08 NOTE — TELEPHONE ENCOUNTER
I started taking 50 mg (two pills) of the Zoloft as of last night. Can I get an updated prescription of 50 mg as taking the double does will have me running out early next week.   Last office visit 2/26/2021  Last med refill 2/26/2021

## 2021-03-23 DIAGNOSIS — E11.9 TYPE 2 DIABETES MELLITUS WITHOUT COMPLICATION, WITHOUT LONG-TERM CURRENT USE OF INSULIN (HCC): Primary | ICD-10-CM

## 2021-03-23 DIAGNOSIS — F32.9 REACTIVE DEPRESSION: ICD-10-CM

## 2021-03-23 RX ORDER — SERTRALINE HYDROCHLORIDE 100 MG/1
100 TABLET, FILM COATED ORAL DAILY
Qty: 90 TAB | Refills: 0 | Status: SHIPPED | OUTPATIENT
Start: 2021-03-23 | End: 2021-06-27

## 2021-03-23 NOTE — TELEPHONE ENCOUNTER
Last office visit 2/26/2021  Last med refill 3/8/2021 Electrodesiccation And Curettage Text: The wound bed was treated with electrodesiccation and curettage after the biopsy was performed.

## 2021-03-24 ENCOUNTER — TELEPHONE (OUTPATIENT)
Dept: PRIMARY CARE CLINIC | Age: 46
End: 2021-03-24

## 2021-05-03 DIAGNOSIS — F41.9 ANXIETY AND DEPRESSION: ICD-10-CM

## 2021-05-03 DIAGNOSIS — E11.9 TYPE 2 DIABETES MELLITUS WITHOUT COMPLICATION, WITHOUT LONG-TERM CURRENT USE OF INSULIN (HCC): ICD-10-CM

## 2021-05-03 DIAGNOSIS — F32.A ANXIETY AND DEPRESSION: ICD-10-CM

## 2021-05-03 RX ORDER — METFORMIN HYDROCHLORIDE 850 MG/1
TABLET ORAL
Qty: 180 TAB | Refills: 0 | Status: SHIPPED | OUTPATIENT
Start: 2021-05-03 | End: 2021-07-18

## 2021-05-03 RX ORDER — BUSPIRONE HYDROCHLORIDE 15 MG/1
15 TABLET ORAL 2 TIMES DAILY
Qty: 180 TAB | Refills: 0 | Status: SHIPPED | OUTPATIENT
Start: 2021-05-03 | End: 2021-07-18

## 2021-05-03 NOTE — TELEPHONE ENCOUNTER
Requested Prescriptions     Pending Prescriptions Disp Refills    busPIRone (BUSPAR) 15 mg tablet 180 Tab 0     Sig: Take 1 Tab by mouth two (2) times a day for 90 days.         Last Visit 2/26/21  Last Refill 2/3/21

## 2021-06-12 DIAGNOSIS — I10 ESSENTIAL HYPERTENSION: ICD-10-CM

## 2021-06-12 RX ORDER — LOSARTAN POTASSIUM 50 MG/1
TABLET ORAL
Qty: 90 TABLET | Refills: 0 | Status: SHIPPED | OUTPATIENT
Start: 2021-06-12 | End: 2021-09-07

## 2021-06-15 DIAGNOSIS — E11.9 TYPE 2 DIABETES MELLITUS WITHOUT COMPLICATION, WITHOUT LONG-TERM CURRENT USE OF INSULIN (HCC): ICD-10-CM

## 2021-06-15 RX ORDER — EMPAGLIFLOZIN 10 MG/1
TABLET, FILM COATED ORAL
Qty: 90 TABLET | Refills: 0 | Status: SHIPPED | OUTPATIENT
Start: 2021-06-15 | End: 2021-09-07

## 2021-06-27 DIAGNOSIS — F32.9 REACTIVE DEPRESSION: ICD-10-CM

## 2021-06-27 RX ORDER — SERTRALINE HYDROCHLORIDE 100 MG/1
TABLET, FILM COATED ORAL
Qty: 90 TABLET | Refills: 0 | Status: SHIPPED | OUTPATIENT
Start: 2021-06-27 | End: 2021-09-20

## 2021-07-18 DIAGNOSIS — F32.A ANXIETY AND DEPRESSION: ICD-10-CM

## 2021-07-18 DIAGNOSIS — E11.9 TYPE 2 DIABETES MELLITUS WITHOUT COMPLICATION, WITHOUT LONG-TERM CURRENT USE OF INSULIN (HCC): ICD-10-CM

## 2021-07-18 DIAGNOSIS — F41.9 ANXIETY AND DEPRESSION: ICD-10-CM

## 2021-07-18 RX ORDER — BUSPIRONE HYDROCHLORIDE 15 MG/1
TABLET ORAL
Qty: 180 TABLET | Refills: 0 | Status: SHIPPED | OUTPATIENT
Start: 2021-07-18 | End: 2022-04-25 | Stop reason: SDUPTHER

## 2021-07-18 RX ORDER — METFORMIN HYDROCHLORIDE 850 MG/1
TABLET ORAL
Qty: 180 TABLET | Refills: 0 | Status: SHIPPED | OUTPATIENT
Start: 2021-07-18 | End: 2022-10-28

## 2021-09-07 DIAGNOSIS — I10 ESSENTIAL HYPERTENSION: ICD-10-CM

## 2021-09-07 DIAGNOSIS — E11.9 TYPE 2 DIABETES MELLITUS WITHOUT COMPLICATION, WITHOUT LONG-TERM CURRENT USE OF INSULIN (HCC): ICD-10-CM

## 2021-09-07 RX ORDER — LOSARTAN POTASSIUM 50 MG/1
TABLET ORAL
Qty: 90 TABLET | Refills: 0 | Status: SHIPPED | OUTPATIENT
Start: 2021-09-07 | End: 2022-01-31

## 2021-09-07 RX ORDER — EMPAGLIFLOZIN 10 MG/1
TABLET, FILM COATED ORAL
Qty: 90 TABLET | Refills: 0 | Status: SHIPPED | OUTPATIENT
Start: 2021-09-07 | End: 2022-02-20

## 2021-09-20 DIAGNOSIS — F32.9 REACTIVE DEPRESSION: ICD-10-CM

## 2021-09-20 RX ORDER — SERTRALINE HYDROCHLORIDE 100 MG/1
TABLET, FILM COATED ORAL
Qty: 90 TABLET | Refills: 0 | Status: SHIPPED | OUTPATIENT
Start: 2021-09-20 | End: 2021-12-26

## 2021-10-05 ENCOUNTER — OFFICE VISIT (OUTPATIENT)
Dept: PRIMARY CARE CLINIC | Age: 46
End: 2021-10-05
Payer: COMMERCIAL

## 2021-10-05 DIAGNOSIS — F32.A ANXIETY AND DEPRESSION: ICD-10-CM

## 2021-10-05 DIAGNOSIS — H92.02 LEFT EAR PAIN: ICD-10-CM

## 2021-10-05 DIAGNOSIS — F41.9 ANXIETY AND DEPRESSION: ICD-10-CM

## 2021-10-05 DIAGNOSIS — J06.0 SORE THROAT AND LARYNGITIS: Primary | ICD-10-CM

## 2021-10-05 DIAGNOSIS — E11.8 TYPE II DIABETES MELLITUS WITH COMPLICATION (HCC): ICD-10-CM

## 2021-10-05 DIAGNOSIS — E53.8 B12 DEFICIENCY DUE TO DIET: ICD-10-CM

## 2021-10-05 DIAGNOSIS — Z12.11 COLON CANCER SCREENING: ICD-10-CM

## 2021-10-05 DIAGNOSIS — I10 ESSENTIAL HYPERTENSION: ICD-10-CM

## 2021-10-05 DIAGNOSIS — Z11.59 NEED FOR HEPATITIS C SCREENING TEST: ICD-10-CM

## 2021-10-05 PROCEDURE — 99214 OFFICE O/P EST MOD 30 MIN: CPT | Performed by: INTERNAL MEDICINE

## 2021-10-05 RX ORDER — AMOXICILLIN AND CLAVULANATE POTASSIUM 875; 125 MG/1; MG/1
1 TABLET, FILM COATED ORAL EVERY 12 HOURS
Qty: 20 TABLET | Refills: 0 | Status: SHIPPED | OUTPATIENT
Start: 2021-10-05 | End: 2021-10-15

## 2021-10-05 RX ORDER — MOMETASONE FUROATE 50 UG/1
2 SPRAY, METERED NASAL DAILY
Qty: 1 EACH | Refills: 1 | Status: SHIPPED | OUTPATIENT
Start: 2021-10-05 | End: 2021-11-04

## 2021-10-05 NOTE — PROGRESS NOTES
Tracie Hernadez (: 1975) is a 51 y.o. male, established patient, here for evaluation of the following chief complaint(s):   No chief complaint on file. Written by [facility], as dictated by Dr. Kristy Malagon MD.      ASSESSMENT/PLAN:  Below is the assessment and plan developed based on review of pertinent history, labs, studies, and medications. 1. Sore throat and laryngitis  Advised him to complete salt water gargles. Prescribed Nasonex 50 mcg, use 2 sprays in both nostrils daily as prescribed. Potential side effects were discussed. If Nasonex is not covered by his insurance, advised him to buy OTC Flonase. Prescribed Augmentin 875-125 mg, take 1 tab every 12 hours as prescribed if symptoms do not improve in a couple of days start this medication. Potential side effects were discussed. -     mometasone (NASONEX) 50 mcg/actuation nasal spray; 2 Sprays by Both Nostrils route daily for 30 days. , Normal, Disp-1 Each, R-1 sent to pharmacy. -     amoxicillin-clavulanate (AUGMENTIN) 875-125 mg per tablet; Take 1 Tablet by mouth every twelve (12) hours for 10 days. , Normal, Disp-20 Tablet, R-0 sent to pharmacy. 2. Left ear pain  Take OTC Tylenol Cold + Sinus as directed on the package. Prescribed Nasonex 50 mcg, use 2 sprays in both nostrils daily as prescribed. Potential side effects were discussed. If Nasonex is not covered by his insurance, advised him to buy OTC Flonase. Prescribed Augmentin 875-125 mg, take 1 tab every 12 hours as prescribed if symptoms do not improve in a couple of days start this medication. Potential side effects were discussed. -     mometasone (NASONEX) 50 mcg/actuation nasal spray; 2 Sprays by Both Nostrils route daily for 30 days. , Normal, Disp-1 Each, R-1 sent to pharmacy. -     amoxicillin-clavulanate (AUGMENTIN) 875-125 mg per tablet; Take 1 Tablet by mouth every twelve (12) hours for 10 days. , Normal, Disp-20 Tablet, R-0 sent to pharmacy.     3. Type II diabetes mellitus with complication (HCC)  Take Jardiance 10 mg and metformin 850 mg BID as prescribed. Advised him to make a habit of taking his medication everyday to control his BS. Ordered labs to check kidney function, lipid panel, CBC levels, metabolic panel, and V4Y level. Waiting for results. -     MICROALBUMIN, UR, RAND W/ MICROALB/CREAT RATIO; Future  -     LIPID PANEL; Future  -     CBC W/O DIFF; Future  -     METABOLIC PANEL, COMPREHENSIVE; Future  -     HEMOGLOBIN A1C WITH EAG; Future    4. Essential hypertension  Continue taking losartan 50 mg as prescribed. Will check BP at next office visit. 5. Anxiety and depression  Well controlled on current medication. Continue taking Zoloft 100 mg and Buspar 15 mg as prescribed. 6. Need for hepatitis C screening test  Ordered Hepatitis C test. Waiting for results.  -     HEPATITIS C AB; Future    7. Colon cancer screening  Ordered stool test. Waiting for results. -     OCCULT BLOOD IMMUNOASSAY,DIAGNOSTIC; Future    8. B12 deficiency due to diet  Ordered lab work to check Vitamin B12 levels. Waiting for results. Recommend that patient take a Vitamin B12 supplement daily. -     VITAMIN B12; Future    SUBJECTIVE/OBJECTIVE:  HPI    Patient presents today virtually c/o sore throat, nasal congestion, sinus pressure, ear pain, and a low grade fever x 2 days. He takes Jardiance 10 mg and metformin 850 mg BID for diabetes. He notes he often forgets to take his medication because he has been traveling recently. He has been trying to do better about remembering to take it medication. When he takes his medications his fasting BS run around . When he misses his medications his fasting BS run around 120-150. He c/o tingling and numbness in his BL feet. He takes Zoloft 100 mg and Buspar 15 mg for anxiety. He notes this medication works well. He takes losartan 50 mg for HTN. He takes pravastatin 10 mg for hyperlipidemia.    Patient Active Problem List   Diagnosis Code    Essential hypertension I10    Type 2 diabetes mellitus without complication ([facility] M73.0    Morbid obesity ([facility] E66.01    Renal calculus, left N20.0        Current Outpatient Medications on File Prior to Visit   Medication Sig Dispense Refill    sertraline (ZOLOFT) 100 mg tablet TAKE 1 TABLET BY MOUTH EVERY DAY 90 Tablet 0    Jardiance 10 mg tablet TAKE 1 TABLET BY MOUTH EVERY DAY 90 Tablet 0    losartan (COZAAR) 50 mg tablet TAKE 1 TABLET BY MOUTH EVERY DAY 90 Tablet 0    busPIRone (BUSPAR) 15 mg tablet TAKE 1 TABLET BY MOUTH TWICE A  Tablet 0    metFORMIN (GLUCOPHAGE) 850 mg tablet TAKE 1 TABLET BY MOUTH TWICE A DAY WITH MEALS 180 Tablet 0    glucose blood VI test strips (OneTouch Verio test strips) strip USE TO CHECK BLOOD SUGAR TWICE A DAY 50 Strip 1    [DISCONTINUED] ALPRAZolam (XANAX) 0.5 mg tablet Take 1 Tab by mouth nightly as needed for Anxiety for up to 10 doses. Max Daily Amount: 0.5 mg. 10 Tab 0    pravastatin (PRAVACHOL) 10 mg tablet TAKE 1 TABLET BY MOUTH EVERY DAY AT NIGHT 90 Tab 0    [DISCONTINUED] glimepiride (AMARYL) 1 mg tablet TAKE 1 TABLET BY MOUTH TWO (2) TIMES A DAY FOR 30 DAYS. 60 Tab 0    fluticasone propionate (FLONASE) 50 mcg/actuation nasal spray 1 spray each nostril daily 1 Bottle 0     No current facility-administered medications on file prior to visit.        Allergies   Allergen Reactions    Wellbutrin [Bupropion Hcl] Unknown (comments)       Past Medical History:   Diagnosis Date    Diabetes ([facility]     Hypertension        Past Surgical History:   Procedure Laterality Date    HX TONSILLECTOMY  1990       Family History   Problem Relation Age of Onset    Hypertension Mother     Headache Mother     Headache Sister     Diabetes Maternal Grandfather        Social History     Socioeconomic History    Marital status:      Spouse name: Not on file    Number of children: Not on file    Years of education: Not on file  Highest education level: Not on file   Occupational History    Not on file   Tobacco Use    Smoking status: Never Smoker    Smokeless tobacco: Never Used   Substance and Sexual Activity    Alcohol use: No    Drug use: No    Sexual activity: Yes     Partners: Female   Other Topics Concern    Not on file   Social History Narrative    Not on file     Social Determinants of Health     Financial Resource Strain:     Difficulty of Paying Living Expenses:    Food Insecurity:     Worried About Running Out of Food in the Last Year:     [address] in the Last Year:    Transportation Needs:     Lack of Transportation (Medical):  Lack of Transportation (Non-Medical):    Physical Activity:     Days of Exercise per Week:     Minutes of Exercise per Session:    Stress:     Feeling of Stress :    Social Connections:     Frequency of Communication with Friends and Family:     Frequency of Social Gatherings with Friends and Family:     Attends Mu-ism Services:     Active Member of Clubs or Organizations:     Attends Club or Organization Meetings:     Marital Status:    Intimate Partner Violence:     Fear of Current or Ex-Partner:     Emotionally Abused:     Physically Abused:     Sexually Abused:        No visits with results within 3 Month(s) from this visit.    Latest known visit with results is:   Office Visit on 08/06/2020   Component Date Value Ref Range Status    Glucose 08/06/2020 144* 65 - 99 mg/dL Final    BUN 08/06/2020 9  6 - 24 mg/dL Final    Creatinine 08/06/2020 0.95  0.76 - 1.27 mg/dL Final    GFR est non-AA 08/06/2020 96  >59 mL/min/1.73 Final    GFR est AA 08/06/2020 111  >59 mL/min/1.73 Final    BUN/Creatinine ratio 08/06/2020 9  9 - 20 Final    Sodium 08/06/2020 140  134 - 144 mmol/L Final    Potassium 08/06/2020 4.6  3.5 - 5.2 mmol/L Final    Chloride 08/06/2020 102  96 - 106 mmol/L Final    CO2 08/06/2020 22  20 - 29 mmol/L Final    Calcium 08/06/2020 9.3  8.7 - 10.2 mg/dL Final    Protein, total 08/06/2020 7.1  6.0 - 8.5 g/dL Final    Albumin 08/06/2020 4.7  4.0 - 5.0 g/dL Final    GLOBULIN, TOTAL 08/06/2020 2.4  1.5 - 4.5 g/dL Final    A-G Ratio 08/06/2020 2.0  1.2 - 2.2 Final    Bilirubin, total 08/06/2020 0.3  0.0 - 1.2 mg/dL Final    Alk. phosphatase 08/06/2020 53  39 - 117 IU/L Final    AST (SGOT) 08/06/2020 32  0 - 40 IU/L Final    ALT (SGPT) 08/06/2020 58* 0 - 44 IU/L Final    Hemoglobin A1c 08/06/2020 8.0* 4.8 - 5.6 % Final    Comment:          Prediabetes: 5.7 - 6.4           Diabetes: >6.4           Glycemic control for adults with diabetes: <7.0      Estimated average glucose 08/06/2020 183  mg/dL Final    VITAMIN D, 25-HYDROXY 08/06/2020 48.8  30.0 - 100.0 ng/mL Final    Comment: Vitamin D deficiency has been defined by the [place] of  Medicine and an Endocrine Society practice guideline as a  level of serum 25-OH vitamin D less than 20 ng/mL (1,2). The Endocrine Society went on to further define vitamin D  insufficiency as a level between 21 and 29 ng/mL (2). 1. IOM ([place] of [employer]). 2010. Dietary reference     intakes for calcium and D. [facility]: The     [employer]. 2. Griselda MF, Carlene BOWDEN, Dread VAZQUEZ, et al.     Evaluation, treatment, and prevention of vitamin D     deficiency: an Endocrine Society clinical practice     guideline. JCEM. 2011 Jul; 96(7):1911-30.  ALBUMIN, URINE POC 08/06/2020 80  Negative mg/L Final    CREATININE, URINE POC 08/06/2020 300  mg/dL Final    Microalbumin/creat ratio (POC) 08/06/2020   <30 MG/G Final       Review of Systems   Constitutional: Positive for fever. Negative for activity change, fatigue and unexpected weight change. HENT: Positive for congestion, ear pain, sinus pressure and sore throat. Negative for ear discharge, hearing loss and rhinorrhea. Eyes: Negative for pain, discharge and redness.    Respiratory: Negative for cough, chest tightness and shortness of breath. Cardiovascular: Negative for leg swelling. Gastrointestinal: Negative for abdominal pain, constipation and diarrhea. Endocrine: Negative for polyuria. Genitourinary: Negative for dysuria, flank pain and urgency. Musculoskeletal: Negative for arthralgias, back pain and myalgias. Skin: Negative for color change. Neurological: Negative for dizziness, light-headedness and headaches. Psychiatric/Behavioral: Negative for dysphoric mood and sleep disturbance. The patient is not nervous/anxious.            Physical Exam    [INSTRUCTIONS:  \"[x]\" Indicates a positive item  \"[]\" Indicates a negative item  -- DELETE ALL ITEMS NOT EXAMINED]    Constitutional: [x] Appears well-developed and well-nourished [x] No apparent distress      [] Abnormal -     Mental status: [x] Alert and awake  [x] Oriented to person/place/time [x] Able to follow commands    [] Abnormal -     Eyes:   EOM    [x]  Normal    [] Abnormal -   Sclera  [x]  Normal    [] Abnormal -          Discharge [x]  None visible   [] Abnormal -     HENT: [x] Normocephalic, atraumatic  [] Abnormal -   [x] Mouth/Throat: Mucous membranes are moist    External Ears [x] Normal  [] Abnormal -    Neck: [x] No visualized mass [] Abnormal -     Pulmonary/Chest: [x] Respiratory effort normal   [x] No visualized signs of difficulty breathing or respiratory distress        [] Abnormal -      Musculoskeletal:   [x] Normal gait with no signs of ataxia         [x] Normal range of motion of neck        [] Abnormal -     Neurological:        [x] No Facial Asymmetry (Cranial nerve 7 motor function) (limited exam due to video visit)          [x] No gaze palsy        [] Abnormal -          Skin:        [x] No significant exanthematous lesions or discoloration noted on facial skin         [] Abnormal -            Psychiatric:       [x] Normal Affect [] Abnormal -        [x] No Hallucinations    Other pertinent observable physical exam findings:-          Luci Calvo, was evaluated through a synchronous (real-time) audio-video encounter. The patient (or guardian if applicable) is aware that this is a billable service. Verbal consent to proceed has been obtained within the past 12 months. The visit was conducted pursuant to the emergency declaration under the [address] [place] [address] and the [employer] and [employer] Act. Patient identification was verified, and a caregiver was present when appropriate. The patient was located in a state where the provider was credentialed to provide care. An electronic signature was used to authenticate this note.   -- Gen Florence

## 2021-10-06 ENCOUNTER — TELEPHONE (OUTPATIENT)
Dept: PRIMARY CARE CLINIC | Age: 46
End: 2021-10-06

## 2021-10-06 DIAGNOSIS — R09.81 NASAL CONGESTION: Primary | ICD-10-CM

## 2021-10-06 RX ORDER — AZELASTINE 1 MG/ML
1 SPRAY, METERED NASAL 2 TIMES DAILY
Qty: 1 EACH | Refills: 1 | Status: SHIPPED | OUTPATIENT
Start: 2021-10-06 | End: 2021-10-16

## 2021-10-08 ENCOUNTER — TELEPHONE (OUTPATIENT)
Dept: PRIMARY CARE CLINIC | Age: 46
End: 2021-10-08

## 2021-12-13 ENCOUNTER — TELEPHONE (OUTPATIENT)
Dept: PRIMARY CARE CLINIC | Age: 46
End: 2021-12-13

## 2021-12-13 NOTE — TELEPHONE ENCOUNTER
Called patient and asked how he is feeling, patient stated he is feeling much better but still having congestion and feels as if there is a heavy weight on his chest. Recommend that patient follows up with an urgent care, relayed the message of no availability. Patient stated he understood and doesn't mind following up at an urgent care, asked him to let us know what they tell him if he goes.

## 2021-12-13 NOTE — TELEPHONE ENCOUNTER
Call center transferred call to office. Pt tested pos covid 12/4/21. seeks VV as tightness in chest when taking deep breaths. congestion. pt states feels like sinus infection. he states nothing severe but doesn't want it to turn into something else. No appt's avail at call. Please advice. Pt states he does have inhaler and it is helping.

## 2021-12-22 DIAGNOSIS — F32.9 REACTIVE DEPRESSION: ICD-10-CM

## 2021-12-26 RX ORDER — SERTRALINE HYDROCHLORIDE 100 MG/1
TABLET, FILM COATED ORAL
Qty: 90 TABLET | Refills: 0 | Status: SHIPPED | OUTPATIENT
Start: 2021-12-26

## 2022-01-31 DIAGNOSIS — I10 ESSENTIAL HYPERTENSION: ICD-10-CM

## 2022-01-31 RX ORDER — LOSARTAN POTASSIUM 50 MG/1
TABLET ORAL
Qty: 90 TABLET | Refills: 0 | Status: SHIPPED | OUTPATIENT
Start: 2022-01-31 | End: 2022-05-01

## 2022-02-20 DIAGNOSIS — E11.9 TYPE 2 DIABETES MELLITUS WITHOUT COMPLICATION, WITHOUT LONG-TERM CURRENT USE OF INSULIN (HCC): ICD-10-CM

## 2022-02-20 RX ORDER — EMPAGLIFLOZIN 10 MG/1
TABLET, FILM COATED ORAL
Qty: 90 TABLET | Refills: 0 | Status: SHIPPED | OUTPATIENT
Start: 2022-02-20 | End: 2022-05-23

## 2022-03-20 PROBLEM — N20.0 RENAL CALCULUS, LEFT: Status: ACTIVE | Noted: 2018-04-05

## 2022-03-29 DIAGNOSIS — E78.2 MIXED HYPERLIPIDEMIA: ICD-10-CM

## 2022-03-29 RX ORDER — PRAVASTATIN SODIUM 10 MG/1
TABLET ORAL
Qty: 90 TABLET | Refills: 0 | Status: SHIPPED | OUTPATIENT
Start: 2022-03-29 | End: 2022-10-01

## 2022-04-25 DIAGNOSIS — F32.A ANXIETY AND DEPRESSION: ICD-10-CM

## 2022-04-25 DIAGNOSIS — F41.9 ANXIETY AND DEPRESSION: ICD-10-CM

## 2022-04-26 RX ORDER — BUSPIRONE HYDROCHLORIDE 15 MG/1
15 TABLET ORAL 2 TIMES DAILY
Qty: 180 TABLET | Refills: 0 | Status: SHIPPED | OUTPATIENT
Start: 2022-04-26 | End: 2022-07-31

## 2022-05-01 DIAGNOSIS — I10 ESSENTIAL HYPERTENSION: ICD-10-CM

## 2022-05-01 RX ORDER — LOSARTAN POTASSIUM 50 MG/1
TABLET ORAL
Qty: 90 TABLET | Refills: 0 | Status: SHIPPED | OUTPATIENT
Start: 2022-05-01 | End: 2022-07-31

## 2022-05-17 DIAGNOSIS — E11.9 TYPE 2 DIABETES MELLITUS WITHOUT COMPLICATION, WITHOUT LONG-TERM CURRENT USE OF INSULIN (HCC): ICD-10-CM

## 2022-05-17 RX ORDER — BLOOD SUGAR DIAGNOSTIC
STRIP MISCELLANEOUS
Qty: 50 STRIP | Refills: 1 | Status: SHIPPED | OUTPATIENT
Start: 2022-05-17 | End: 2022-07-08

## 2022-05-23 DIAGNOSIS — E11.9 TYPE 2 DIABETES MELLITUS WITHOUT COMPLICATION, WITHOUT LONG-TERM CURRENT USE OF INSULIN (HCC): ICD-10-CM

## 2022-05-23 RX ORDER — EMPAGLIFLOZIN 10 MG/1
TABLET, FILM COATED ORAL
Qty: 90 TABLET | Refills: 0 | Status: SHIPPED | OUTPATIENT
Start: 2022-05-23 | End: 2022-08-24

## 2022-07-08 DIAGNOSIS — E11.9 TYPE 2 DIABETES MELLITUS WITHOUT COMPLICATION, WITHOUT LONG-TERM CURRENT USE OF INSULIN (HCC): ICD-10-CM

## 2022-07-08 RX ORDER — BLOOD SUGAR DIAGNOSTIC
STRIP MISCELLANEOUS
Qty: 50 STRIP | Refills: 1 | Status: SHIPPED | OUTPATIENT
Start: 2022-07-08

## 2022-07-30 DIAGNOSIS — F41.9 ANXIETY AND DEPRESSION: ICD-10-CM

## 2022-07-30 DIAGNOSIS — F32.A ANXIETY AND DEPRESSION: ICD-10-CM

## 2022-07-30 DIAGNOSIS — I10 ESSENTIAL HYPERTENSION: ICD-10-CM

## 2022-07-31 RX ORDER — LOSARTAN POTASSIUM 50 MG/1
TABLET ORAL
Qty: 90 TABLET | Refills: 0 | Status: SHIPPED | OUTPATIENT
Start: 2022-07-31

## 2022-07-31 RX ORDER — BUSPIRONE HYDROCHLORIDE 15 MG/1
TABLET ORAL
Qty: 180 TABLET | Refills: 0 | Status: SHIPPED | OUTPATIENT
Start: 2022-07-31

## 2022-11-20 DIAGNOSIS — E11.9 TYPE 2 DIABETES MELLITUS WITHOUT COMPLICATION, WITHOUT LONG-TERM CURRENT USE OF INSULIN (HCC): ICD-10-CM

## 2022-11-28 RX ORDER — METFORMIN HYDROCHLORIDE 850 MG/1
850 TABLET ORAL 2 TIMES DAILY WITH MEALS
Qty: 30 TABLET | Refills: 0 | Status: SHIPPED | OUTPATIENT
Start: 2022-11-28

## 2022-12-12 ENCOUNTER — OFFICE VISIT (OUTPATIENT)
Dept: PRIMARY CARE CLINIC | Age: 47
End: 2022-12-12
Payer: COMMERCIAL

## 2022-12-12 VITALS
HEART RATE: 93 BPM | DIASTOLIC BLOOD PRESSURE: 84 MMHG | RESPIRATION RATE: 18 BRPM | BODY MASS INDEX: 41.75 KG/M2 | TEMPERATURE: 97.5 F | HEIGHT: 73 IN | SYSTOLIC BLOOD PRESSURE: 136 MMHG | WEIGHT: 315 LBS | OXYGEN SATURATION: 95 %

## 2022-12-12 DIAGNOSIS — Z12.11 COLON CANCER SCREENING: ICD-10-CM

## 2022-12-12 DIAGNOSIS — E11.9 TYPE 2 DIABETES MELLITUS WITHOUT COMPLICATION, WITHOUT LONG-TERM CURRENT USE OF INSULIN (HCC): Primary | ICD-10-CM

## 2022-12-12 DIAGNOSIS — Z11.59 NEED FOR HEPATITIS C SCREENING TEST: ICD-10-CM

## 2022-12-12 DIAGNOSIS — E66.01 MORBIDLY OBESE (HCC): ICD-10-CM

## 2022-12-12 DIAGNOSIS — F32.9 REACTIVE DEPRESSION: ICD-10-CM

## 2022-12-12 DIAGNOSIS — I10 PRIMARY HYPERTENSION: ICD-10-CM

## 2022-12-12 PROCEDURE — 99214 OFFICE O/P EST MOD 30 MIN: CPT | Performed by: INTERNAL MEDICINE

## 2022-12-12 PROCEDURE — 3074F SYST BP LT 130 MM HG: CPT | Performed by: INTERNAL MEDICINE

## 2022-12-12 PROCEDURE — 3078F DIAST BP <80 MM HG: CPT | Performed by: INTERNAL MEDICINE

## 2022-12-12 RX ORDER — METFORMIN HYDROCHLORIDE 850 MG/1
850 TABLET ORAL 2 TIMES DAILY WITH MEALS
Qty: 30 TABLET | Refills: 0 | Status: CANCELLED | OUTPATIENT
Start: 2022-12-12

## 2022-12-12 RX ORDER — SERTRALINE HYDROCHLORIDE 100 MG/1
100 TABLET, FILM COATED ORAL DAILY
Qty: 30 TABLET | Refills: 2 | Status: SHIPPED | OUTPATIENT
Start: 2022-12-12

## 2022-12-12 RX ORDER — BISMUTH SUBSALICYLATE 262 MG
1 TABLET,CHEWABLE ORAL DAILY
COMMUNITY

## 2022-12-12 NOTE — PROGRESS NOTES
Chief Complaint   Patient presents with    Medication Refill     Not consistent with medications, not checking B.S reg   Declined Flu in office    Visit Vitals  BP (!) 149/88 (BP 1 Location: Left upper arm)   Pulse 93   Temp 97.5 °F (36.4 °C)   Resp 18   Ht 6' 1\" (1.854 m)   Wt 347 lb 9.6 oz (157.7 kg)   SpO2 95%   BMI 45.86 kg/m²       1. Have you been to the ER, urgent care clinic since your last visit? Hospitalized since your last visit? Yes Multiple Telemed Visits, [facility] for Covid    2. Have you seen or consulted any other health care providers outside of the [address] [place] [address] since your last visit? Include any pap smears or colon screening. No      3. For patients aged 39-70: Has the patient had a colonoscopy / FIT/ Cologuard?  No

## 2022-12-12 NOTE — TELEPHONE ENCOUNTER
Requested Prescriptions     Pending Prescriptions Disp Refills    sertraline (ZOLOFT) 100 mg tablet 15 Tablet 0     Sig: Take 1 Tablet by mouth daily.         Last Visit 12/12/22  Last Refill 11/27/22

## 2022-12-22 DIAGNOSIS — E78.2 MIXED HYPERLIPIDEMIA: ICD-10-CM

## 2022-12-22 DIAGNOSIS — E11.9 TYPE 2 DIABETES MELLITUS WITHOUT COMPLICATION, WITHOUT LONG-TERM CURRENT USE OF INSULIN (HCC): ICD-10-CM

## 2022-12-23 RX ORDER — EMPAGLIFLOZIN 10 MG/1
TABLET, FILM COATED ORAL
Qty: 90 TABLET | Refills: 0 | Status: SHIPPED | OUTPATIENT
Start: 2022-12-23

## 2022-12-23 RX ORDER — PRAVASTATIN SODIUM 10 MG/1
TABLET ORAL
Qty: 90 TABLET | Refills: 0 | Status: SHIPPED | OUTPATIENT
Start: 2022-12-23

## 2023-01-19 DIAGNOSIS — E11.9 TYPE 2 DIABETES MELLITUS WITHOUT COMPLICATION, WITHOUT LONG-TERM CURRENT USE OF INSULIN (HCC): ICD-10-CM

## 2023-01-19 RX ORDER — METFORMIN HYDROCHLORIDE 850 MG/1
TABLET ORAL
Qty: 90 TABLET | Refills: 0 | Status: SHIPPED | OUTPATIENT
Start: 2023-01-19

## 2023-02-06 DIAGNOSIS — F32.9 REACTIVE DEPRESSION: ICD-10-CM

## 2023-02-09 RX ORDER — SERTRALINE HYDROCHLORIDE 100 MG/1
100 TABLET, FILM COATED ORAL DAILY
Qty: 30 TABLET | Refills: 2 | Status: SHIPPED | OUTPATIENT
Start: 2023-02-09

## 2023-02-24 DIAGNOSIS — I10 ESSENTIAL HYPERTENSION: ICD-10-CM

## 2023-02-24 RX ORDER — LOSARTAN POTASSIUM 50 MG/1
TABLET ORAL
Qty: 30 TABLET | Refills: 1 | Status: SHIPPED | OUTPATIENT
Start: 2023-02-24

## 2023-03-11 DIAGNOSIS — E11.9 TYPE 2 DIABETES MELLITUS WITHOUT COMPLICATION, WITHOUT LONG-TERM CURRENT USE OF INSULIN (HCC): ICD-10-CM

## 2023-03-12 RX ORDER — METFORMIN HYDROCHLORIDE 850 MG/1
TABLET ORAL
Qty: 90 TABLET | Refills: 0 | Status: SHIPPED | OUTPATIENT
Start: 2023-03-12

## 2023-03-20 DIAGNOSIS — I10 ESSENTIAL HYPERTENSION: ICD-10-CM

## 2023-03-20 RX ORDER — LOSARTAN POTASSIUM 50 MG/1
TABLET ORAL
Qty: 30 TABLET | Refills: 1 | Status: SHIPPED | OUTPATIENT
Start: 2023-03-20

## 2023-03-27 DIAGNOSIS — E78.2 MIXED HYPERLIPIDEMIA: ICD-10-CM

## 2023-03-27 RX ORDER — PRAVASTATIN SODIUM 10 MG/1
TABLET ORAL
Qty: 90 TABLET | Refills: 0 | Status: SHIPPED | OUTPATIENT
Start: 2023-03-27

## 2023-03-28 DIAGNOSIS — E11.9 TYPE 2 DIABETES MELLITUS WITHOUT COMPLICATION, WITHOUT LONG-TERM CURRENT USE OF INSULIN (HCC): ICD-10-CM

## 2023-03-28 RX ORDER — EMPAGLIFLOZIN 10 MG/1
TABLET, FILM COATED ORAL
Qty: 90 TABLET | Refills: 0 | Status: SHIPPED | OUTPATIENT
Start: 2023-03-28

## 2023-04-18 DIAGNOSIS — I10 ESSENTIAL HYPERTENSION: ICD-10-CM

## 2023-04-18 RX ORDER — LOSARTAN POTASSIUM 50 MG/1
TABLET ORAL
Qty: 30 TABLET | Refills: 1 | Status: SHIPPED | OUTPATIENT
Start: 2023-04-18

## 2023-04-21 DIAGNOSIS — E11.9 TYPE 2 DIABETES MELLITUS WITHOUT COMPLICATION, WITHOUT LONG-TERM CURRENT USE OF INSULIN (HCC): ICD-10-CM

## 2023-04-22 RX ORDER — METFORMIN HYDROCHLORIDE 850 MG/1
TABLET ORAL
Qty: 90 TABLET | Refills: 0 | Status: SHIPPED | OUTPATIENT
Start: 2023-04-22

## 2023-05-10 RX ORDER — SEMAGLUTIDE 1.34 MG/ML
INJECTION, SOLUTION SUBCUTANEOUS
COMMUNITY
Start: 2023-03-19

## 2023-05-10 RX ORDER — SERTRALINE HYDROCHLORIDE 100 MG/1
TABLET, FILM COATED ORAL
COMMUNITY
Start: 2023-05-09

## 2023-05-10 RX ORDER — LOSARTAN POTASSIUM 50 MG/1
50 TABLET ORAL DAILY
COMMUNITY
Start: 2023-04-18

## 2023-05-10 RX ORDER — PRAVASTATIN SODIUM 10 MG
TABLET ORAL
COMMUNITY
Start: 2023-03-27

## 2023-05-10 NOTE — TELEPHONE ENCOUNTER
PCP: Fco Flores MD    Last appt:  No future appointments.     Requested Prescriptions     Pending Prescriptions Disp Refills    metFORMIN (GLUCOPHAGE) 850 MG tablet 180 tablet 0     Sig: Take 1 tablet by mouth 2 times daily (with meals)         Other Comments:Last fill-03/12/2023

## 2023-05-13 DIAGNOSIS — I10 ESSENTIAL (PRIMARY) HYPERTENSION: ICD-10-CM

## 2023-05-15 RX ORDER — LOSARTAN POTASSIUM 50 MG/1
TABLET ORAL
Qty: 30 TABLET | Refills: 0 | Status: SHIPPED | OUTPATIENT
Start: 2023-05-15 | End: 2023-06-01 | Stop reason: SDUPTHER

## 2023-05-17 RX ORDER — SERTRALINE HYDROCHLORIDE 100 MG/1
100 TABLET, FILM COATED ORAL DAILY
Qty: 90 TABLET | Refills: 0 | Status: CANCELLED | OUTPATIENT
Start: 2023-05-17

## 2023-05-24 RX ORDER — FLUTICASONE PROPIONATE 50 MCG
SPRAY, SUSPENSION (ML) NASAL
COMMUNITY
Start: 2020-02-05

## 2023-05-24 RX ORDER — BUSPIRONE HYDROCHLORIDE 15 MG/1
1 TABLET ORAL 2 TIMES DAILY
COMMUNITY
Start: 2023-01-22

## 2023-06-01 DIAGNOSIS — I10 ESSENTIAL (PRIMARY) HYPERTENSION: ICD-10-CM

## 2023-06-01 RX ORDER — LOSARTAN POTASSIUM 50 MG/1
50 TABLET ORAL DAILY
Qty: 30 TABLET | Refills: 0 | Status: SHIPPED | OUTPATIENT
Start: 2023-06-01 | End: 2023-07-01

## 2023-06-05 RX ORDER — SEMAGLUTIDE 1.34 MG/ML
INJECTION, SOLUTION SUBCUTANEOUS
Qty: 3 ML | Refills: 2 | OUTPATIENT
Start: 2023-06-05

## 2023-06-21 DIAGNOSIS — E78.2 MIXED HYPERLIPIDEMIA: ICD-10-CM

## 2023-06-22 RX ORDER — PRAVASTATIN SODIUM 10 MG
10 TABLET ORAL DAILY
Qty: 90 TABLET | Refills: 0 | Status: SHIPPED | OUTPATIENT
Start: 2023-06-22

## 2023-07-31 RX ORDER — BUSPIRONE HYDROCHLORIDE 15 MG/1
15 TABLET ORAL 2 TIMES DAILY
Qty: 60 TABLET | Refills: 0 | Status: SHIPPED | OUTPATIENT
Start: 2023-07-31 | End: 2023-10-29

## 2023-07-31 NOTE — TELEPHONE ENCOUNTER
Requested Prescriptions     Pending Prescriptions Disp Refills    busPIRone (BUSPAR) 15 MG tablet 180 tablet 0     Sig: Take 15 mg by mouth 2 times daily        Last Visit 12/12/22  Last Refill 1/22/23

## 2023-10-28 DIAGNOSIS — F41.9 ANXIETY DISORDER, UNSPECIFIED: ICD-10-CM

## 2023-10-29 RX ORDER — BUSPIRONE HYDROCHLORIDE 15 MG/1
15 TABLET ORAL 2 TIMES DAILY
Qty: 90 TABLET | Refills: 0 | Status: SHIPPED | OUTPATIENT
Start: 2023-10-29 | End: 2023-12-28

## 2023-11-08 DIAGNOSIS — E78.2 MIXED HYPERLIPIDEMIA: ICD-10-CM

## 2023-11-08 RX ORDER — PRAVASTATIN SODIUM 10 MG
10 TABLET ORAL DAILY
Qty: 90 TABLET | Refills: 0 | Status: SHIPPED | OUTPATIENT
Start: 2023-11-08

## 2023-11-16 DIAGNOSIS — F41.9 ANXIETY DISORDER, UNSPECIFIED TYPE: Primary | ICD-10-CM

## 2023-11-16 RX ORDER — SERTRALINE HYDROCHLORIDE 100 MG/1
100 TABLET, FILM COATED ORAL DAILY
Qty: 30 TABLET | Refills: 0 | Status: SHIPPED | OUTPATIENT
Start: 2023-11-16 | End: 2023-12-16

## 2023-11-20 ENCOUNTER — TELEPHONE (OUTPATIENT)
Dept: PRIMARY CARE CLINIC | Facility: CLINIC | Age: 48
End: 2023-11-20

## 2023-12-01 DIAGNOSIS — E11.9 TYPE 2 DIABETES MELLITUS WITHOUT COMPLICATION, UNSPECIFIED WHETHER LONG TERM INSULIN USE (HCC): Primary | ICD-10-CM

## 2023-12-28 DIAGNOSIS — E11.9 TYPE 2 DIABETES MELLITUS WITHOUT COMPLICATION, UNSPECIFIED WHETHER LONG TERM INSULIN USE (HCC): ICD-10-CM

## 2023-12-28 DIAGNOSIS — F41.9 ANXIETY DISORDER, UNSPECIFIED: ICD-10-CM

## 2023-12-28 RX ORDER — BUSPIRONE HYDROCHLORIDE 15 MG/1
15 TABLET ORAL 2 TIMES DAILY
Qty: 60 TABLET | Refills: 0 | Status: SHIPPED | OUTPATIENT
Start: 2023-12-28

## 2024-01-24 DIAGNOSIS — F41.9 ANXIETY DISORDER, UNSPECIFIED TYPE: ICD-10-CM

## 2024-01-24 RX ORDER — SERTRALINE HYDROCHLORIDE 100 MG/1
100 TABLET, FILM COATED ORAL DAILY
Qty: 30 TABLET | Refills: 0 | OUTPATIENT
Start: 2024-01-24

## 2024-02-13 DIAGNOSIS — F41.9 ANXIETY DISORDER, UNSPECIFIED: ICD-10-CM

## 2024-02-13 DIAGNOSIS — E11.9 TYPE 2 DIABETES MELLITUS WITHOUT COMPLICATION, UNSPECIFIED WHETHER LONG TERM INSULIN USE (HCC): ICD-10-CM

## 2024-02-13 DIAGNOSIS — F41.9 ANXIETY DISORDER, UNSPECIFIED TYPE: ICD-10-CM

## 2024-02-14 RX ORDER — BUSPIRONE HYDROCHLORIDE 15 MG/1
15 TABLET ORAL 2 TIMES DAILY
Qty: 30 TABLET | Refills: 0 | Status: SHIPPED | OUTPATIENT
Start: 2024-02-14

## 2024-02-14 RX ORDER — SERTRALINE HYDROCHLORIDE 100 MG/1
100 TABLET, FILM COATED ORAL DAILY
Qty: 30 TABLET | Refills: 0 | Status: SHIPPED | OUTPATIENT
Start: 2024-02-14

## 2024-03-10 DIAGNOSIS — F41.9 ANXIETY DISORDER, UNSPECIFIED TYPE: ICD-10-CM

## 2024-03-11 ENCOUNTER — TELEPHONE (OUTPATIENT)
Dept: PRIMARY CARE CLINIC | Facility: CLINIC | Age: 49
End: 2024-03-11

## 2024-03-11 RX ORDER — SERTRALINE HYDROCHLORIDE 100 MG/1
TABLET, FILM COATED ORAL
Qty: 30 TABLET | Refills: 0 | OUTPATIENT
Start: 2024-03-11

## 2024-03-11 NOTE — TELEPHONE ENCOUNTER
Called and left a VM for the patient, needing an appointment for further refills as the patient has not been seen since 2022

## 2024-03-28 DIAGNOSIS — E11.9 TYPE 2 DIABETES MELLITUS WITHOUT COMPLICATION, UNSPECIFIED WHETHER LONG TERM INSULIN USE (HCC): ICD-10-CM

## 2024-03-28 DIAGNOSIS — F41.9 ANXIETY DISORDER, UNSPECIFIED: ICD-10-CM

## 2024-03-28 RX ORDER — BUSPIRONE HYDROCHLORIDE 15 MG/1
15 TABLET ORAL 2 TIMES DAILY
Qty: 30 TABLET | Refills: 0 | OUTPATIENT
Start: 2024-03-28

## 2024-04-01 NOTE — PROGRESS NOTES
Duong Leal (: 1975) is a 49 y.o. male, established patient, here for evaluation of the following chief complaint(s):  Medication Refill (Not consistent with medications, not checking B.S reg)       ASSESSMENT/PLAN:  Below is the assessment and plan developed based on review of pertinent history, physical exam, labs, studies, and medications. 1. Type 2 diabetes mellitus without complication, without long-term current use of insulin ([facility]  I prescribed him Ozempic and potential side effects discussed with him. If he tolerates it well we will increase the dose to 1 mg.  -     METABOLIC PANEL, COMPREHENSIVE; Future  -     CBC W/O DIFF; Future  -     LIPID PANEL; Future  -     MICROALBUMIN, UR, RAND W/ MICROALB/CREAT RATIO; Future  -     HEMOGLOBIN A1C WITH EAG; Future  -     HM DIABETES FOOT EXAM  -     semaglutide (OZEMPIC) 0.25 mg or 0.5 mg/dose (2 mg/1.5 ml) subq pen; 0.5 mg by SubCUTAneous route every seven (7) days for 30 days. , Normal, Disp-1 Box, R-0  2. Primary hypertension  Continue on current dose of losartan. 3. Need for hepatitis C screening test  -     HEPATITIS C AB; Future  4. Colon cancer screening  -     OCCULT BLOOD IMMUNOASSAY,DIAGNOSTIC; Future  5. Morbidly obese ([facility] [address]  I did recommend losing weight as it will help not only with the blood pressure readings but also will bring diabetes numbers down  6. Anxiety   He wants to stay on Zoloft and BuSpar at this time. SUBJECTIVE/OBJECTIVE:  Patient seen today for follow-up on diabetes and hypertension. He has not been in the office for almost a year and a half. He was going through a lot of stress as he got a new job and that required a lot of traveling. He did admit that he has not been taking his medications regularly and diet has not been that great due to his frequent travel.   When he takes his medication his blood sugars in the morning are running in low 100s but for last 2 days he was not taking his metformin and his blood sugars were in 300s. He has had gained a lot of weight in last 1 year but now he is trying to lose with diet and increase physical activity. He is willing to try medication which can help him to lose weight. He is not under stress at this time and is adjusting well with his new position but wants to stay on current anxiety medications. Visit Vitals  /84 (BP 1 Location: Left upper arm)   Pulse 93   Temp 97.5 °F (36.4 °C)   Resp 18   Ht 6' 1\" (1.854 m)   Wt 347 lb 9.6 oz (157.7 kg)   SpO2 95%   BMI 45.86 kg/m²      Patient Active Problem List   Diagnosis Code    Essential hypertension I10    Type 2 diabetes mellitus without complication ([facility]) J45.4    Morbid obesity ([facility]) E66.01    Renal calculus, left N20.0        Current Outpatient Medications on File Prior to Visit   Medication Sig Dispense Refill    multivitamin (ONE A DAY) tablet Take 1 Tablet by mouth daily. metFORMIN (GLUCOPHAGE) 850 mg tablet Take 1 Tablet by mouth two (2) times daily (with meals). 30 Tablet 0    losartan (COZAAR) 50 mg tablet TAKE 1 TABLET BY MOUTH EVERY DAY 15 Tablet 0    busPIRone (BUSPAR) 15 mg tablet TAKE 1 TABLET BY MOUTH TWICE A DAY 30 Tablet 0    sertraline (ZOLOFT) 100 mg tablet TAKE 1 TABLET BY MOUTH EVERY DAY 15 Tablet 0    pravastatin (PRAVACHOL) 10 mg tablet TAKE 1 TABLET BY MOUTH EVERY DAY AT BEDTIME 90 Tablet 0    Jardiance 10 mg tablet TAKE 1 TABLET BY MOUTH EVERY DAY 90 Tablet 0    OneTouch Verio test strips strip USE TO CHECK BLOOD SUGAR TWICE A DAY 50 Strip 1    fluticasone propionate (FLONASE) 50 mcg/actuation nasal spray 1 spray each nostril daily (Patient not taking: Reported on 12/12/2022) 1 Bottle 0     No current facility-administered medications on file prior to visit.        Allergies   Allergen Reactions    Wellbutrin [Bupropion Hcl] Unknown (comments)       Past Medical History:   Diagnosis Date    Diabetes ([facility] [address]     Hypertension        Past Surgical History:   Procedure Laterality Date    HX TONSILLECTOMY  1990       Family History   Problem Relation Age of Onset    Hypertension Mother     Headache Mother     Headache Sister     Diabetes Maternal Grandfather        Social History     Socioeconomic History    Marital status:      Spouse name: Not on file    Number of children: Not on file    Years of education: Not on file    Highest education level: Not on file   Occupational History    Not on file   Tobacco Use    Smoking status: Never    Smokeless tobacco: Never   Vaping Use    Vaping Use: Never used   Substance and Sexual Activity    Alcohol use: No    Drug use: No    Sexual activity: Yes     Partners: Female   Other Topics Concern    Not on file   Social History Narrative    Not on file     Social Determinants of Health     Financial Resource Strain: Low Risk     Difficulty of Paying Living Expenses: Not very hard   Food Insecurity: No Food Insecurity    Worried About Running Out of Food in the Last Year: Never true    Ran Out of Food in the Last Year: Never true   Transportation Needs: Not on file   Physical Activity: Not on file   Stress: Not on file   Social Connections: Not on file   Intimate Partner Violence: Not on file   Housing Stability: Not on file       No visits with results within 3 Month(s) from this visit.    Latest known visit with results is:   Office Visit on 08/06/2020   Component Date Value Ref Range Status    Glucose 08/06/2020 144 (A)  65 - 99 mg/dL Final    BUN 08/06/2020 9  6 - 24 mg/dL Final    Creatinine 08/06/2020 0.95  0.76 - 1.27 mg/dL Final    GFR est non-AA 08/06/2020 96  >59 mL/min/1.73 Final    GFR est AA 08/06/2020 111  >59 mL/min/1.73 Final    BUN/Creatinine ratio 08/06/2020 9  9 - 20 Final    Sodium 08/06/2020 140  134 - 144 mmol/L Final    Potassium 08/06/2020 4.6  3.5 - 5.2 mmol/L Final    Chloride 08/06/2020 102  96 - 106 mmol/L Final    CO2 08/06/2020 22  20 - 29 mmol/L Final    Calcium 08/06/2020 9.3  8.7 - 10.2 mg/dL Final    Protein, total 08/06/2020 7.1  6.0 - 8.5 g/dL Final    Albumin 08/06/2020 4.7  4.0 - 5.0 g/dL Final    GLOBULIN, TOTAL 08/06/2020 2.4  1.5 - 4.5 g/dL Final    A-G Ratio 08/06/2020 2.0  1.2 - 2.2 Final    Bilirubin, total 08/06/2020 0.3  0.0 - 1.2 mg/dL Final    Alk. phosphatase 08/06/2020 53  39 - 117 IU/L Final    AST (SGOT) 08/06/2020 32  0 - 40 IU/L Final    ALT (SGPT) 08/06/2020 58 (A)  0 - 44 IU/L Final    Hemoglobin A1c 08/06/2020 8.0 (A)  4.8 - 5.6 % Final    Comment:          Prediabetes: 5.7 - 6.4           Diabetes: >6.4           Glycemic control for adults with diabetes: <7.0      Estimated average glucose 08/06/2020 183  mg/dL Final    VITAMIN D, 25-HYDROXY 08/06/2020 48.8  30.0 - 100.0 ng/mL Final    Comment: Vitamin D deficiency has been defined by the [place] [employer]  [employer] and an Endocrine Society practice guideline as a  level of serum 25-OH vitamin D less than 20 ng/mL (1,2). The Endocrine Society went on to further define vitamin D  insufficiency as a level between 21 and 29 ng/mL (2). 1. IOM ([place] [employer]). 2010. Dietary reference     intakes for calcium and D. [address] [facility]: The     "[employer]. 2. Griselda MF, Carlene NC, Dread VAZQUEZ, et al.     Evaluation, treatment, and prevention of vitamin D     deficiency: an Endocrine Society clinical practice     guideline. JCEM. 2011 Jul; 96(7):1911-30. ALBUMIN, URINE POC 08/06/2020 80  Negative mg/L Final    CREATININE, URINE POC 08/06/2020 300  mg/dL Final    Microalbumin/creat ratio (POC) 08/06/2020   <30 MG/G Final        Review of Systems   Constitutional:  Negative for activity change, fatigue and unexpected weight change. HENT:  Negative for congestion, ear discharge, ear pain, hearing loss, rhinorrhea and sore throat. Eyes:  Negative for pain, discharge and redness. Respiratory:  Negative for cough, chest tightness and shortness of breath. Cardiovascular:  Negative for leg swelling.    Gastrointestinal:  Negative for abdominal pain, constipation and diarrhea. Endocrine: Negative for polyuria. Genitourinary:  Negative for dysuria, flank pain and urgency. Musculoskeletal:  Negative for arthralgias, back pain and myalgias. Skin:  Negative for color change. Neurological:  Negative for dizziness, light-headedness and headaches. Psychiatric/Behavioral:  Negative for dysphoric mood and sleep disturbance. The patient is not nervous/anxious. Physical Exam    Vitals and nursing note reviewed. Constitutional:       Appearance: Normal appearance. Morbidly Obese   Eyes:      Extraocular Movements: Extraocular movements intact. Pupils: Pupils are equal, round, and reactive to light. Cardiovascular:      Rate and Rhythm: Normal rate and regular rhythm. Pulmonary:      Effort: Pulmonary effort is normal.      Breath sounds: Normal breath sounds. Abdominal:      General: Bowel sounds are normal. There is no distension. Palpations: Abdomen is soft. Musculoskeletal:         General: No swelling. Normal range of motion. Cervical back: Normal range of motion and neck supple. Right lower leg: No edema. Left lower leg: No edema. Neurological:      General: No focal deficit present. Mental Status:  Alert and oriented to person, place, and time. Motor: No weakness. Psychiatric:         Mood and Affect: Mood normal.         Behavior: Behavior normal.   Diabetic foot exam:     Left: Vibratory sensation normal    Sharp/dull discrimination normal    Filament test normal sensation with micro filament   Pulse DP: 2+ (normal)   Deformities: None  Right: Vibratory sensation normal   Sharp/dull discrimination normal   Filament test normal sensation with micro filament   Pulse DP: 2+ (normal)   Deformities: None         An electronic signature was used to authenticate this note.   -- Aminah Palomo MD within normal limits

## 2024-06-10 ENCOUNTER — TELEPHONE (OUTPATIENT)
Dept: PRIMARY CARE CLINIC | Facility: CLINIC | Age: 49
End: 2024-06-10

## 2024-06-10 ENCOUNTER — OFFICE VISIT (OUTPATIENT)
Age: 49
End: 2024-06-10

## 2024-06-10 VITALS
DIASTOLIC BLOOD PRESSURE: 95 MMHG | RESPIRATION RATE: 18 BRPM | SYSTOLIC BLOOD PRESSURE: 154 MMHG | HEART RATE: 84 BPM | HEIGHT: 73 IN | TEMPERATURE: 98.1 F | BODY MASS INDEX: 41.75 KG/M2 | OXYGEN SATURATION: 94 % | WEIGHT: 315 LBS

## 2024-06-10 DIAGNOSIS — E11.9 TYPE 2 DIABETES MELLITUS WITHOUT COMPLICATION, WITHOUT LONG-TERM CURRENT USE OF INSULIN (HCC): Primary | ICD-10-CM

## 2024-06-10 DIAGNOSIS — R03.0 ELEVATED BLOOD PRESSURE READING: ICD-10-CM

## 2024-06-10 LAB — GLUCOSE, POC: 306 MG/DL

## 2024-06-10 NOTE — TELEPHONE ENCOUNTER
Patient called to request enough refills to take him to his appt on 7/3 on Jardiance 10mg and Losartan 50mg to be sent to [facility] on [address] In [place].  Please reach out to patient if you are not able to refill the prescriptions at # [phone].

## 2024-06-10 NOTE — PROGRESS NOTES
[facility] NOTE TO THE EMERGENCY DEPARTMENT    CHIEF COMPLAINT    Chief Complaint   Patient presents with    Diabetes     check blood glucose- 372 @ 11:41am           MEDICAL DECISION MAKING    Patient presented with   Chief Complaint   Patient presents with    Diabetes     check blood glucose- 372 @ 11:41am      .    Discussed potential concerns for: Patient currently not on any diabetes blood pressure or cholesterol medication.  No labs in the chart since 2021.  Patient has not been seen at PCP since last 2 years.  Advised patient we will not be able to provide prescriptions as this is considered as starting new medications as he has been out for several months and needs primary care workup prior to restarting his medications.   Recommend patient to seek care at the nearest emergency department for further evaluation.      Transportation:  private car    TEST / PROCEDURES COMPLETED AT URGENT CARE:  1.No results found for this visit on 06/10/24.  2.   Results for orders placed or performed in visit on 06/10/24   AMB POC GLUCOSE BLOOD, BY GLUCOSE MONITORING DEVICE   Result Value Ref Range    Glucose,  MG/DL      Assessment & Plan       Visit Diagnoses and Associated Orders       Type 2 diabetes mellitus without complication, without long-term current use of insulin ([facility])    -  Primary    AMB POC GLUCOSE BLOOD, BY GLUCOSE MONITORING DEVICE [50861 CPT(R)]           Elevated blood pressure reading        Ambulatory referral to Internal Medicine [REF40 Custom]                    Subjective   HPI    Olu Jenkins is a 48 y.o. male who presents uncontrolled diabetes, currently not taking any medications, requesting refills.      CURRENT MEDICATIONS    Current Outpatient Rx   Medication Sig Dispense Refill    busPIRone (BUSPAR) 15 MG tablet TAKE 1 TABLET BY MOUTH TWICE A DAY 30 tablet 0    sertraline (ZOLOFT) 100 MG tablet TAKE 1 TABLET BY MOUTH EVERY DAY 30 tablet 0    metFORMIN (GLUCOPHAGE) 850 MG tablet TAKE

## 2024-06-10 NOTE — PATIENT INSTRUCTIONS
GO TO ER FOR FURTHER EVALUATION & MANAGEMENT.     Discussed potential concerns for: elevated blood glucose , needs labs, off of medications , no recent labs in chart.   Recommend patient to seek care at the nearest emergency department for further evaluation. Olu Jenkins  please note following recommendations today due to your elevated BP reading: rescreen BP within a minimum of 2 weeks and lifestyle modifications to include: dietary sodium restriction and increase physical activity.    Transportation:  private car

## 2024-06-10 NOTE — TELEPHONE ENCOUNTER
Called the patient, patient has not been seen since 12/2022. It has been listed on medication refills that he needs an appointment.     Called in March to notify that appointment is required.     Patient went to  today said he was there for \"like 3-4 hours\", high BP and also stated that his fast sugars are 350-400's. Patient has not been on medication.  told him to go to ED per their note. And to see PCP. PCP out of office for two weeks. Patient stated he understands that this is a situation of his own making however he wants to know if we can work him in sooner or send enough until appointment. Cannot do, schedule is full, recommended that the patient goes to ED per the  notes and based on current situation on blood sugars being so high with high BP today.  Patient was pretty upset but said okay thanks

## 2024-07-03 ENCOUNTER — OFFICE VISIT (OUTPATIENT)
Dept: PRIMARY CARE CLINIC | Facility: CLINIC | Age: 49
End: 2024-07-03
Payer: COMMERCIAL

## 2024-07-03 VITALS
BODY MASS INDEX: 41.75 KG/M2 | HEART RATE: 87 BPM | HEIGHT: 73 IN | WEIGHT: 315 LBS | TEMPERATURE: 97.9 F | OXYGEN SATURATION: 97 % | SYSTOLIC BLOOD PRESSURE: 118 MMHG | DIASTOLIC BLOOD PRESSURE: 78 MMHG | RESPIRATION RATE: 16 BRPM

## 2024-07-03 DIAGNOSIS — Z11.59 NEED FOR HEPATITIS C SCREENING TEST: ICD-10-CM

## 2024-07-03 DIAGNOSIS — E11.65 TYPE 2 DIABETES MELLITUS WITH HYPERGLYCEMIA, WITHOUT LONG-TERM CURRENT USE OF INSULIN (HCC): Primary | ICD-10-CM

## 2024-07-03 DIAGNOSIS — I10 ESSENTIAL HYPERTENSION: ICD-10-CM

## 2024-07-03 DIAGNOSIS — Z12.11 COLON CANCER SCREENING: ICD-10-CM

## 2024-07-03 DIAGNOSIS — Z11.4 ENCOUNTER FOR SCREENING FOR HIV: ICD-10-CM

## 2024-07-03 DIAGNOSIS — E66.01 MORBID OBESITY (HCC): ICD-10-CM

## 2024-07-03 DIAGNOSIS — F32.A ANXIETY AND DEPRESSION: ICD-10-CM

## 2024-07-03 DIAGNOSIS — F41.9 ANXIETY AND DEPRESSION: ICD-10-CM

## 2024-07-03 PROCEDURE — 3074F SYST BP LT 130 MM HG: CPT | Performed by: INTERNAL MEDICINE

## 2024-07-03 PROCEDURE — 3078F DIAST BP <80 MM HG: CPT | Performed by: INTERNAL MEDICINE

## 2024-07-03 PROCEDURE — 99214 OFFICE O/P EST MOD 30 MIN: CPT | Performed by: INTERNAL MEDICINE

## 2024-07-03 RX ORDER — SERTRALINE HYDROCHLORIDE 100 MG/1
100 TABLET, FILM COATED ORAL DAILY
Qty: 90 TABLET | Refills: 0 | Status: SHIPPED | OUTPATIENT
Start: 2024-07-03 | End: 2024-10-01

## 2024-07-03 SDOH — ECONOMIC STABILITY: FOOD INSECURITY: WITHIN THE PAST 12 MONTHS, THE FOOD YOU BOUGHT JUST DIDN'T LAST AND YOU DIDN'T HAVE MONEY TO GET MORE.: PATIENT DECLINED

## 2024-07-03 SDOH — ECONOMIC STABILITY: INCOME INSECURITY: HOW HARD IS IT FOR YOU TO PAY FOR THE VERY BASICS LIKE FOOD, HOUSING, MEDICAL CARE, AND HEATING?: PATIENT DECLINED

## 2024-07-03 SDOH — ECONOMIC STABILITY: HOUSING INSECURITY
IN THE LAST 12 MONTHS, WAS THERE A TIME WHEN YOU DID NOT HAVE A STEADY PLACE TO SLEEP OR SLEPT IN A SHELTER (INCLUDING NOW)?: PATIENT DECLINED

## 2024-07-03 SDOH — ECONOMIC STABILITY: FOOD INSECURITY: WITHIN THE PAST 12 MONTHS, YOU WORRIED THAT YOUR FOOD WOULD RUN OUT BEFORE YOU GOT MONEY TO BUY MORE.: PATIENT DECLINED

## 2024-07-03 ASSESSMENT — PATIENT HEALTH QUESTIONNAIRE - PHQ9
SUM OF ALL RESPONSES TO PHQ9 QUESTIONS 1 & 2: 0
SUM OF ALL RESPONSES TO PHQ QUESTIONS 1-9: 0
1. LITTLE INTEREST OR PLEASURE IN DOING THINGS: NOT AT ALL
2. FEELING DOWN, DEPRESSED OR HOPELESS: NOT AT ALL
SUM OF ALL RESPONSES TO PHQ QUESTIONS 1-9: 0

## 2024-07-03 ASSESSMENT — ENCOUNTER SYMPTOMS
CHEST TIGHTNESS: 0
DIARRHEA: 0
CONSTIPATION: 0
BACK PAIN: 0
RHINORRHEA: 0
EYE DISCHARGE: 0
SORE THROAT: 0
ABDOMINAL PAIN: 0
COLOR CHANGE: 0
COUGH: 0
SHORTNESS OF BREATH: 0

## 2024-07-03 NOTE — PROGRESS NOTES
Olu Jenkins (:  1975) is a 48 y.o. male, Established patient, here for evaluation of the following chief complaint(s):  Follow-up and Diabetes (Reports that [facility] is 15.1)    Assessment & Plan   ASSESSMENT/PLAN:  1. Type 2 diabetes mellitus with hyperglycemia, without long-term current use of insulin (HCC)  -     CBC; Future  -     Lipid Panel; Future  -     Microalbumin / Creatinine Urine Ratio; Future  -     Hemoglobin A1C; Future  -     HM DIABETES FOOT EXAM  I ordered a CBC, a lipid panel, blood work to measure Hgb A1C levels, and a urinalysis to measure microalbumin/ creatinine ratio. I performed a diabetic foot exam. I recommend that he continue taking Jardiance 25mg and metformin 1000mg BID.    2. Essential hypertension  -     Comprehensive Metabolic Panel; Future  I ordered a CMP. BP is well-controlled on current medication. No change to dosage at this time.    3. Morbid obesity (HCC)  I recommend that he continue walking. I explained that 5,000 steps are needed daily for healthy lifestyle and to maintain conditioning, and he will need to increase to 10,000 a day to work on weight loss.      4. Need for hepatitis C screening test  -     Hepatitis C Antibody; Future  Ordered Hepatitis C test. Waiting for results.     5. Encounter for screening for HIV  -     HIV 1/2 Ag/Ab, 4TH Generation,W Rflx Confirm; Future  I ordered blood work to check for HIV.    6. Colon cancer screening  -     Occult Blood Stool Immunoassay; Future  I ordered a FIT test.    7. Anxiety and depression  -     sertraline (ZOLOFT) 100 MG tablet; Take 1 tablet by mouth daily, Disp-90 tablet, R-0Normal sent to pharmacy.   I refilled sertraline 100mg to continue taking daily.          Subjective   SUBJECTIVE/OBJECTIVE:  Diabetes  Hypoglycemia symptoms include nervousness/anxiousness. Pertinent negatives for hypoglycemia include no dizziness or headaches. Pertinent negatives for diabetes include no fatigue.

## 2024-07-03 NOTE — PROGRESS NOTES
Health Decision Maker has been checked with the patient      Patient has stated that the scribe can come in room    Chief Complaint   Patient presents with    Follow-up    Diabetes     Reports that [facility] 15.1       \"Have you been to the ER, urgent care clinic since your last visit?  Hospitalized since your last visit?\"    YES - When: approximately 3  weeks ago.  Where and Why: [facility]    “Have you seen or consulted any other [facility] providers [facility] since your last visit?”    NO      Vitals:    07/03/24 1511   BP: 118/78   Site: Left Upper Arm   Pulse: 87   Resp: 16   Temp: 97.9 °F (36.6 °C)   SpO2: 97%   Weight: (!) 144.1 kg (317 lb 9.6 oz)   Height: 1.854 m (6' 1\")      Depression: Not at risk (7/3/2024)    PHQ-2     PHQ-2 Score: 0        “Have you had a colorectal cancer screening such as a colonoscopy/FIT/Cologuard?    NO    No colonoscopy on file  No cologuard on file  No FIT/FOBT on file   No flexible sigmoidoscopy on file         Click Here for Release of Records Request    Specialist patient sees: none    Chart reviewed: immunizations are documented.   Immunization History   Administered Date(s) Administered    COVID-19, MODERNA BLUE border, Primary or Immunocompromised, (age 12y+), IM, 100 mcg/0.5mL 05/10/2021, 06/11/2021    Influenza Virus Vaccine 09/10/2020    Influenza, FLUARIX, FLULAVAL, FLUZONE (age 6 mo+) AND AFLURIA, (age 3 y+), PF, 0.5mL 12/03/2019    Influenza, FLUCELVAX, (age 6 mo+), MDCK, PF, 0.5mL 10/23/2018    Influenza, Intradermal, Preservative free 11/20/2015    TDaP, ADACEL (age 10y-64y), BOOSTRIX (age 10y+), IM, 0.5mL 09/18/2020

## 2024-07-15 ENCOUNTER — PATIENT MESSAGE (OUTPATIENT)
Dept: PRIMARY CARE CLINIC | Facility: CLINIC | Age: 49
End: 2024-07-15

## 2024-07-15 DIAGNOSIS — F41.9 ANXIETY DISORDER, UNSPECIFIED: ICD-10-CM

## 2024-07-15 DIAGNOSIS — E11.9 TYPE 2 DIABETES MELLITUS WITHOUT COMPLICATION, UNSPECIFIED WHETHER LONG TERM INSULIN USE (HCC): Primary | ICD-10-CM

## 2024-07-16 RX ORDER — BUSPIRONE HYDROCHLORIDE 15 MG/1
15 TABLET ORAL 2 TIMES DAILY
Qty: 180 TABLET | Refills: 0 | Status: SHIPPED | OUTPATIENT
Start: 2024-07-16

## 2024-07-16 NOTE — TELEPHONE ENCOUNTER
From: Olu Jenkins  To: Dr. Gloria Jain  Sent: 7/15/2024 4:44 PM EDT  Subject: Request Prescription for One-Touch Testing Strips    Since I cannot submit against insurance without a prescription, is it possible to have a new prescription for the testing strips?    Thank You,  Elmo Jenkins

## 2024-08-10 LAB — SPECIMEN STATUS REPORT: NORMAL

## 2024-09-14 LAB
ALBUMIN SERPL-MCNC: 4.6 G/DL (ref 4.1–5.1)
ALBUMIN/CREAT UR: 29 MG/G CREAT (ref 0–29)
ALP SERPL-CCNC: 68 IU/L (ref 44–121)
ALT SERPL-CCNC: 23 IU/L (ref 0–44)
AST SERPL-CCNC: 15 IU/L (ref 0–40)
BASOPHILS # BLD AUTO: 0.1 X10E3/UL (ref 0–0.2)
BASOPHILS NFR BLD AUTO: 1 %
BILIRUB SERPL-MCNC: 0.3 MG/DL (ref 0–1.2)
BUN SERPL-MCNC: 16 MG/DL (ref 6–24)
BUN/CREAT SERPL: 18 (ref 9–20)
CALCIUM SERPL-MCNC: 9.5 MG/DL (ref 8.7–10.2)
CHLORIDE SERPL-SCNC: 100 MMOL/L (ref 96–106)
CHOLEST SERPL-MCNC: 161 MG/DL (ref 100–199)
CO2 SERPL-SCNC: 24 MMOL/L (ref 20–29)
CREAT SERPL-MCNC: 0.88 MG/DL (ref 0.76–1.27)
CREAT UR-MCNC: 118.7 MG/DL
EGFRCR SERPLBLD CKD-EPI 2021: 105 ML/MIN/1.73
EOSINOPHIL # BLD AUTO: 0.1 X10E3/UL (ref 0–0.4)
EOSINOPHIL NFR BLD AUTO: 2 %
ERYTHROCYTE [DISTWIDTH] IN BLOOD BY AUTOMATED COUNT: 13.3 % (ref 11.6–15.4)
GLOBULIN SER CALC-MCNC: 2.5 G/DL (ref 1.5–4.5)
GLUCOSE SERPL-MCNC: 208 MG/DL (ref 70–99)
HBA1C MFR BLD: 9.8 % (ref 4.8–5.6)
HCT VFR BLD AUTO: 47.8 % (ref 37.5–51)
HCV AB SERPL QL IA: NORMAL
HCV IGG SERPL QL IA: NON REACTIVE
HDLC SERPL-MCNC: 43 MG/DL
HGB BLD-MCNC: 15.1 G/DL (ref 13–17.7)
HIV 1+2 AB+HIV1 P24 AG SERPL QL IA: NON REACTIVE
IMM GRANULOCYTES # BLD AUTO: 0 X10E3/UL (ref 0–0.1)
IMM GRANULOCYTES NFR BLD AUTO: 1 %
LDLC SERPL CALC-MCNC: 101 MG/DL (ref 0–99)
LYMPHOCYTES # BLD AUTO: 2.6 X10E3/UL (ref 0.7–3.1)
LYMPHOCYTES NFR BLD AUTO: 30 %
MCH RBC QN AUTO: 25.6 PG (ref 26.6–33)
MCHC RBC AUTO-ENTMCNC: 31.6 G/DL (ref 31.5–35.7)
MCV RBC AUTO: 81 FL (ref 79–97)
MICROALBUMIN UR-MCNC: 34.3 UG/ML
MONOCYTES # BLD AUTO: 0.8 X10E3/UL (ref 0.1–0.9)
MONOCYTES NFR BLD AUTO: 10 %
NEUTROPHILS # BLD AUTO: 4.8 X10E3/UL (ref 1.4–7)
NEUTROPHILS NFR BLD AUTO: 56 %
PLATELET # BLD AUTO: 353 X10E3/UL (ref 150–450)
POTASSIUM SERPL-SCNC: 4.6 MMOL/L (ref 3.5–5.2)
PROT SERPL-MCNC: 7.1 G/DL (ref 6–8.5)
RBC # BLD AUTO: 5.89 X10E6/UL (ref 4.14–5.8)
SODIUM SERPL-SCNC: 138 MMOL/L (ref 134–144)
TRIGL SERPL-MCNC: 90 MG/DL (ref 0–149)
VLDLC SERPL CALC-MCNC: 17 MG/DL (ref 5–40)
WBC # BLD AUTO: 8.4 X10E3/UL (ref 3.4–10.8)

## 2024-12-11 DIAGNOSIS — F41.9 ANXIETY DISORDER, UNSPECIFIED: ICD-10-CM

## 2024-12-11 DIAGNOSIS — F32.A ANXIETY AND DEPRESSION: ICD-10-CM

## 2024-12-11 DIAGNOSIS — F41.9 ANXIETY AND DEPRESSION: ICD-10-CM

## 2024-12-11 RX ORDER — BUSPIRONE HYDROCHLORIDE 15 MG/1
TABLET ORAL
Qty: 180 TABLET | Refills: 0 | Status: SHIPPED | OUTPATIENT
Start: 2024-12-11

## 2024-12-11 RX ORDER — SERTRALINE HYDROCHLORIDE 100 MG/1
100 TABLET, FILM COATED ORAL DAILY
Qty: 90 TABLET | Refills: 0 | Status: SHIPPED | OUTPATIENT
Start: 2024-12-11

## 2025-02-10 ENCOUNTER — PATIENT MESSAGE (OUTPATIENT)
Dept: PRIMARY CARE CLINIC | Facility: CLINIC | Age: 50
End: 2025-02-10

## 2025-02-10 DIAGNOSIS — E11.9 TYPE 2 DIABETES MELLITUS WITHOUT COMPLICATION, UNSPECIFIED WHETHER LONG TERM INSULIN USE (HCC): ICD-10-CM

## 2025-02-21 ENCOUNTER — OFFICE VISIT (OUTPATIENT)
Dept: PRIMARY CARE CLINIC | Facility: CLINIC | Age: 50
End: 2025-02-21
Payer: COMMERCIAL

## 2025-02-21 VITALS
BODY MASS INDEX: 41.75 KG/M2 | RESPIRATION RATE: 20 BRPM | OXYGEN SATURATION: 95 % | TEMPERATURE: 97 F | SYSTOLIC BLOOD PRESSURE: 134 MMHG | HEIGHT: 73 IN | HEART RATE: 76 BPM | WEIGHT: 315 LBS | DIASTOLIC BLOOD PRESSURE: 78 MMHG

## 2025-02-21 DIAGNOSIS — F32.A ANXIETY AND DEPRESSION: ICD-10-CM

## 2025-02-21 DIAGNOSIS — E11.8 DIABETES MELLITUS WITH COMPLICATION (HCC): ICD-10-CM

## 2025-02-21 DIAGNOSIS — E66.01 MORBIDLY OBESE: ICD-10-CM

## 2025-02-21 DIAGNOSIS — Z12.11 COLON CANCER SCREENING: ICD-10-CM

## 2025-02-21 DIAGNOSIS — I10 ESSENTIAL HYPERTENSION: ICD-10-CM

## 2025-02-21 DIAGNOSIS — G47.33 OSA ON CPAP: ICD-10-CM

## 2025-02-21 DIAGNOSIS — E11.8 DIABETES MELLITUS WITH COMPLICATION (HCC): Primary | ICD-10-CM

## 2025-02-21 DIAGNOSIS — Z11.59 NEED FOR HEPATITIS B SCREENING TEST: ICD-10-CM

## 2025-02-21 DIAGNOSIS — F41.9 ANXIETY AND DEPRESSION: ICD-10-CM

## 2025-02-21 DIAGNOSIS — F51.01 PRIMARY INSOMNIA: ICD-10-CM

## 2025-02-21 LAB
ALBUMIN SERPL-MCNC: 4.2 G/DL (ref 3.5–5)
ALBUMIN/GLOB SERPL: 1.2 (ref 1.1–2.2)
ALP SERPL-CCNC: 74 U/L (ref 45–117)
ALT SERPL-CCNC: 43 U/L (ref 12–78)
ANION GAP SERPL CALC-SCNC: 7 MMOL/L (ref 2–12)
AST SERPL-CCNC: 30 U/L (ref 15–37)
BILIRUB SERPL-MCNC: 0.6 MG/DL (ref 0.2–1)
BUN SERPL-MCNC: 18 MG/DL (ref 6–20)
BUN/CREAT SERPL: 20 (ref 12–20)
CALCIUM SERPL-MCNC: 10 MG/DL (ref 8.5–10.1)
CHLORIDE SERPL-SCNC: 104 MMOL/L (ref 97–108)
CHOLEST SERPL-MCNC: 207 MG/DL
CO2 SERPL-SCNC: 26 MMOL/L (ref 21–32)
CREAT SERPL-MCNC: 0.92 MG/DL (ref 0.7–1.3)
CREAT UR-MCNC: 88.1 MG/DL
ERYTHROCYTE [DISTWIDTH] IN BLOOD BY AUTOMATED COUNT: 14.8 % (ref 11.5–14.5)
EST. AVERAGE GLUCOSE BLD GHB EST-MCNC: 278 MG/DL
GLOBULIN SER CALC-MCNC: 3.6 G/DL (ref 2–4)
GLUCOSE SERPL-MCNC: 178 MG/DL (ref 65–100)
HBA1C MFR BLD: 11.3 % (ref 4–5.6)
HBV SURFACE AB SER QL: NONREACTIVE
HBV SURFACE AB SER-ACNC: 3.18 MIU/ML
HCT VFR BLD AUTO: 51.4 % (ref 36.6–50.3)
HDLC SERPL-MCNC: 39 MG/DL
HDLC SERPL: 5.3 (ref 0–5)
HGB BLD-MCNC: 15.9 G/DL (ref 12.1–17)
LDLC SERPL CALC-MCNC: 137.2 MG/DL (ref 0–100)
MCH RBC QN AUTO: 25.5 PG (ref 26–34)
MCHC RBC AUTO-ENTMCNC: 30.9 G/DL (ref 30–36.5)
MCV RBC AUTO: 82.4 FL (ref 80–99)
MICROALBUMIN UR-MCNC: 3.7 MG/DL
MICROALBUMIN/CREAT UR-RTO: 42 MG/G (ref 0–30)
NRBC # BLD: 0 K/UL (ref 0–0.01)
NRBC BLD-RTO: 0 PER 100 WBC
PLATELET # BLD AUTO: 333 K/UL (ref 150–400)
PMV BLD AUTO: 9.7 FL (ref 8.9–12.9)
POTASSIUM SERPL-SCNC: 5.5 MMOL/L (ref 3.5–5.1)
PROT SERPL-MCNC: 7.8 G/DL (ref 6.4–8.2)
RBC # BLD AUTO: 6.24 M/UL (ref 4.1–5.7)
SODIUM SERPL-SCNC: 137 MMOL/L (ref 136–145)
TRIGL SERPL-MCNC: 154 MG/DL
VLDLC SERPL CALC-MCNC: 30.8 MG/DL
WBC # BLD AUTO: 9.4 K/UL (ref 4.1–11.1)

## 2025-02-21 PROCEDURE — 3078F DIAST BP <80 MM HG: CPT | Performed by: INTERNAL MEDICINE

## 2025-02-21 PROCEDURE — 3075F SYST BP GE 130 - 139MM HG: CPT | Performed by: INTERNAL MEDICINE

## 2025-02-21 PROCEDURE — 3046F HEMOGLOBIN A1C LEVEL >9.0%: CPT | Performed by: INTERNAL MEDICINE

## 2025-02-21 PROCEDURE — 99214 OFFICE O/P EST MOD 30 MIN: CPT | Performed by: INTERNAL MEDICINE

## 2025-02-21 RX ORDER — TRAZODONE HYDROCHLORIDE 50 MG/1
50 TABLET ORAL NIGHTLY
Qty: 30 TABLET | Refills: 1 | Status: SHIPPED | OUTPATIENT
Start: 2025-02-21

## 2025-02-21 RX ORDER — TIRZEPATIDE 2.5 MG/.5ML
2.5 INJECTION, SOLUTION SUBCUTANEOUS WEEKLY
Qty: 2 ML | Refills: 0 | Status: SHIPPED | OUTPATIENT
Start: 2025-02-21 | End: 2025-03-23

## 2025-02-21 SDOH — ECONOMIC STABILITY: FOOD INSECURITY: WITHIN THE PAST 12 MONTHS, THE FOOD YOU BOUGHT JUST DIDN'T LAST AND YOU DIDN'T HAVE MONEY TO GET MORE.: NEVER TRUE

## 2025-02-21 SDOH — ECONOMIC STABILITY: TRANSPORTATION INSECURITY
IN THE PAST 12 MONTHS, HAS LACK OF TRANSPORTATION KEPT YOU FROM MEETINGS, WORK, OR FROM GETTING THINGS NEEDED FOR DAILY LIVING?: NO

## 2025-02-21 SDOH — ECONOMIC STABILITY: FOOD INSECURITY: WITHIN THE PAST 12 MONTHS, YOU WORRIED THAT YOUR FOOD WOULD RUN OUT BEFORE YOU GOT MONEY TO BUY MORE.: NEVER TRUE

## 2025-02-21 SDOH — ECONOMIC STABILITY: INCOME INSECURITY: IN THE LAST 12 MONTHS, WAS THERE A TIME WHEN YOU WERE NOT ABLE TO PAY THE MORTGAGE OR RENT ON TIME?: NO

## 2025-02-21 SDOH — ECONOMIC STABILITY: TRANSPORTATION INSECURITY
IN THE PAST 12 MONTHS, HAS THE LACK OF TRANSPORTATION KEPT YOU FROM MEDICAL APPOINTMENTS OR FROM GETTING MEDICATIONS?: NO

## 2025-02-21 ASSESSMENT — ENCOUNTER SYMPTOMS
SHORTNESS OF BREATH: 0
COLOR CHANGE: 0
DIARRHEA: 0
EYE DISCHARGE: 0
ABDOMINAL PAIN: 0
SORE THROAT: 0
COUGH: 0
CHEST TIGHTNESS: 0
CONSTIPATION: 0
BACK PAIN: 0
RHINORRHEA: 0

## 2025-02-21 ASSESSMENT — PATIENT HEALTH QUESTIONNAIRE - PHQ9
2. FEELING DOWN, DEPRESSED OR HOPELESS: NOT AT ALL
SUM OF ALL RESPONSES TO PHQ QUESTIONS 1-9: 0
1. LITTLE INTEREST OR PLEASURE IN DOING THINGS: NOT AT ALL
SUM OF ALL RESPONSES TO PHQ QUESTIONS 1-9: 0
SUM OF ALL RESPONSES TO PHQ9 QUESTIONS 1 & 2: 0

## 2025-02-21 NOTE — PROGRESS NOTES
\"Have you been to the ER, urgent care clinic since your last visit?  Hospitalized since your last visit?\"    NO      “Have you seen or consulted any other health care providers outside our system since your last visit?”    NO      “Have you had a colorectal cancer screening such as a colonoscopy/FIT/Cologuard?    Has not.       “Have you had a diabetic eye exam?”    Doesn't remember last time.   Record has been requested via fax.       Chief Complaint   Patient presents with    Medication Refill    Nausea    Sleep Problem    Referral - General     Tested for ADD.       Pt is ok with scribe and NP shadowing.       
patients; he has agreed to screening tests.           Subjective   SUBJECTIVE/OBJECTIVE:  Medication Refill  Pertinent negatives include no abdominal pain, arthralgias, congestion, coughing, fatigue, headaches, myalgias, rash or sore throat.   Sleep Problem  Pertinent negatives include no abdominal pain, arthralgias, congestion, coughing, fatigue, headaches, myalgias, rash or sore throat.       Patient presents today for an office visit to follow on recent symptoms. He is fasting for labs.     Patient states he has been travelling recently. He had to stay longer than expected and was thus without his medicine. On Tues morning, his blood sugar was 447 mg/dL. He kept tracking his glucose as levels decreased until it reached the 200s. He reports having difficulty remembering to take his medication and doesn't take his medicine routinely. He has tried Ozempic but had uncontrollable diarrhea and is hesitant about GLP-1s. He is taking metformin 1,000 mg and Jardiance 25 mg daily.     Patient inquires regarding ADD. He says he is forgetful and is up all night. He has not tried prescription medication for sleep. He says he did not fall asleep until 4 am this morning. He has obstructive sleep apnea but does not routinely use his CPAP.     He is due for a diabetic retinal exam.     He is due for a colorectal cancer screening.     BP is WNL today at 134/78. He is taking losartan 50 mg daily.     He is taking pravastatin 10 mg daily.     He is taking Zoloft 100 mg and Buspar 15 mg daily.     He did not receive a COVID or influenza vaccine this year.         Patient Active Problem List   Diagnosis    Essential hypertension    Morbid obesity    Type 2 diabetes mellitus without complication (HCC)    Renal calculus, left        Current Outpatient Medications on File Prior to Visit   Medication Sig Dispense Refill    empagliflozin (JARDIANCE) 25 MG tablet Take 1 tablet by mouth daily 30 tablet 0    blood glucose test strips

## 2025-02-22 DIAGNOSIS — E78.2 COMBINED HYPERLIPIDEMIA: ICD-10-CM

## 2025-02-22 DIAGNOSIS — E11.8 DIABETES MELLITUS WITH COMPLICATION (HCC): Primary | ICD-10-CM

## 2025-02-22 RX ORDER — INSULIN GLARGINE 100 [IU]/ML
10 INJECTION, SOLUTION SUBCUTANEOUS NIGHTLY
Qty: 5 ADJUSTABLE DOSE PRE-FILLED PEN SYRINGE | Refills: 0 | Status: SHIPPED | OUTPATIENT
Start: 2025-02-22

## 2025-02-22 RX ORDER — PRAVASTATIN SODIUM 20 MG
20 TABLET ORAL DAILY
Qty: 90 TABLET | Refills: 1 | Status: SHIPPED | OUTPATIENT
Start: 2025-02-22

## 2025-02-26 ENCOUNTER — TELEPHONE (OUTPATIENT)
Dept: PRIMARY CARE CLINIC | Facility: CLINIC | Age: 50
End: 2025-02-26

## 2025-02-26 DIAGNOSIS — E11.8 DIABETES MELLITUS WITH COMPLICATION (HCC): Primary | ICD-10-CM

## 2025-02-26 NOTE — TELEPHONE ENCOUNTER
PA denied for Mounjaro. I put on PA, pt did not tolerate OZempic well with notes. But for Mounjaro pt has to of tried 2. Ozempic, Trulicity and or generic Victoza.

## 2025-02-27 DIAGNOSIS — R19.5 POSITIVE FECAL IMMUNOCHEMICAL TEST: Primary | ICD-10-CM

## 2025-02-27 LAB — HEMOCCULT STL QL IA: POSITIVE

## 2025-03-11 ENCOUNTER — TELEPHONE (OUTPATIENT)
Age: 50
End: 2025-03-11

## 2025-03-11 ENCOUNTER — OFFICE VISIT (OUTPATIENT)
Age: 50
End: 2025-03-11
Payer: COMMERCIAL

## 2025-03-11 ENCOUNTER — PATIENT MESSAGE (OUTPATIENT)
Dept: PRIMARY CARE CLINIC | Facility: CLINIC | Age: 50
End: 2025-03-11

## 2025-03-11 VITALS
BODY MASS INDEX: 41.75 KG/M2 | RESPIRATION RATE: 18 BRPM | TEMPERATURE: 98.2 F | SYSTOLIC BLOOD PRESSURE: 135 MMHG | HEART RATE: 75 BPM | DIASTOLIC BLOOD PRESSURE: 76 MMHG | OXYGEN SATURATION: 97 % | WEIGHT: 315 LBS | HEIGHT: 73 IN

## 2025-03-11 DIAGNOSIS — R19.5 POSITIVE FIT (FECAL IMMUNOCHEMICAL TEST): Primary | ICD-10-CM

## 2025-03-11 PROCEDURE — 3078F DIAST BP <80 MM HG: CPT | Performed by: SURGERY

## 2025-03-11 PROCEDURE — 99203 OFFICE O/P NEW LOW 30 MIN: CPT | Performed by: SURGERY

## 2025-03-11 PROCEDURE — 3075F SYST BP GE 130 - 139MM HG: CPT | Performed by: SURGERY

## 2025-03-11 RX ORDER — SODIUM, POTASSIUM,MAG SULFATES 17.5-3.13G
1 SOLUTION, RECONSTITUTED, ORAL ORAL ONCE
Qty: 1 EACH | Refills: 0 | Status: SHIPPED | OUTPATIENT
Start: 2025-03-11 | End: 2025-03-11

## 2025-03-11 RX ORDER — LOSARTAN POTASSIUM 50 MG/1
50 TABLET ORAL DAILY
COMMUNITY

## 2025-03-11 ASSESSMENT — PATIENT HEALTH QUESTIONNAIRE - PHQ9
7. TROUBLE CONCENTRATING ON THINGS, SUCH AS READING THE NEWSPAPER OR WATCHING TELEVISION: NOT AT ALL
2. FEELING DOWN, DEPRESSED OR HOPELESS: NOT AT ALL
3. TROUBLE FALLING OR STAYING ASLEEP: NOT AT ALL
SUM OF ALL RESPONSES TO PHQ QUESTIONS 1-9: 0
10. IF YOU CHECKED OFF ANY PROBLEMS, HOW DIFFICULT HAVE THESE PROBLEMS MADE IT FOR YOU TO DO YOUR WORK, TAKE CARE OF THINGS AT HOME, OR GET ALONG WITH OTHER PEOPLE: NOT DIFFICULT AT ALL
5. POOR APPETITE OR OVEREATING: NOT AT ALL
SUM OF ALL RESPONSES TO PHQ QUESTIONS 1-9: 0
6. FEELING BAD ABOUT YOURSELF - OR THAT YOU ARE A FAILURE OR HAVE LET YOURSELF OR YOUR FAMILY DOWN: NOT AT ALL
8. MOVING OR SPEAKING SO SLOWLY THAT OTHER PEOPLE COULD HAVE NOTICED. OR THE OPPOSITE, BEING SO FIGETY OR RESTLESS THAT YOU HAVE BEEN MOVING AROUND A LOT MORE THAN USUAL: NOT AT ALL
1. LITTLE INTEREST OR PLEASURE IN DOING THINGS: NOT AT ALL
4. FEELING TIRED OR HAVING LITTLE ENERGY: NOT AT ALL
SUM OF ALL RESPONSES TO PHQ QUESTIONS 1-9: 0
9. THOUGHTS THAT YOU WOULD BE BETTER OFF DEAD, OR OF HURTING YOURSELF: NOT AT ALL
SUM OF ALL RESPONSES TO PHQ QUESTIONS 1-9: 0

## 2025-03-11 ASSESSMENT — ENCOUNTER SYMPTOMS
VOMITING: 0
ABDOMINAL PAIN: 0
ABDOMINAL DISTENTION: 0
NAUSEA: 0
DIARRHEA: 0
BLOOD IN STOOL: 0
CONSTIPATION: 0

## 2025-03-11 ASSESSMENT — ANXIETY QUESTIONNAIRES
IF YOU CHECKED OFF ANY PROBLEMS ON THIS QUESTIONNAIRE, HOW DIFFICULT HAVE THESE PROBLEMS MADE IT FOR YOU TO DO YOUR WORK, TAKE CARE OF THINGS AT HOME, OR GET ALONG WITH OTHER PEOPLE: NOT DIFFICULT AT ALL

## 2025-03-11 NOTE — PROGRESS NOTES
Identified pt with two pt identifiers (name and ). Reviewed chart in preparation for visit and have obtained necessary documentation.    Olu Jenkins is a 49 y.o. male New Patient (Seen at the request of Dr Gloria Jain to evaluate for positive fecal immunochemical test)  .    Vitals:    25 0942   BP: 135/76   BP Site: Left Upper Arm   Patient Position: Sitting   BP Cuff Size: Large Adult   Pulse: 75   Resp: 18   Temp: 98.2 °F (36.8 °C)   TempSrc: Oral   SpO2: 97%   Weight: (!) 143.7 kg (316 lb 12.8 oz)   Height: 1.854 m (6' 1\")          1. Have you been to the ER, urgent care clinic since your last visit?  Hospitalized since your last visit?  no     2. Have you seen or consulted any other health care providers outside of the [facility] since your last visit?  Include any pap smears or colon screening.  no

## 2025-03-11 NOTE — ASSESSMENT & PLAN NOTE
Needs diagnostic colonoscopy.   I discussed the intended procedure as well as alternative treatments including doing nothing.  I discussed the risks of the procedure including but not limited to bleeding, perforation, aspiration, and damage to surrounding structures. I answered all questions related to the procedure.  Understanding was verbalized and we will proceed as planned.

## 2025-03-11 NOTE — TELEPHONE ENCOUNTER
Attempted to contact patient to schedule colonoscopy with Dr. Diop. No answer, left voicemail for a return call.

## 2025-03-11 NOTE — PROGRESS NOTES
[facility]    Subjective    Patient ID: Olu Jenkins is a 49 y.o. male.   Chief Complaint   Patient presents with    New Patient     Seen at the request of Dr Gloria Jain to evaluate for positive fecal immunochemical test     HPI Comments: Olu Jenkins presents today for positive fit.  No family history of colon cancer.  Hx of hemorrhoids with occasional bleeding.  No change in stools or other bowel changes.  No other concerns.      Allergies   Allergen Reactions    Bupropion      Other reaction(s): Unknown (comments)       Current Outpatient Medications:     losartan (COZAAR) 50 MG tablet, Take 1 tablet by mouth daily, Disp: , Rfl:     dulaglutide (TRULICITY) 0.75 MG/0.5ML SOAJ SC injection, Inject 0.5 mLs into the skin every 7 days, Disp: 2 mL, Rfl: 1    insulin glargine (LANTUS SOLOSTAR) 100 UNIT/ML injection pen, Inject 10 Units into the skin nightly, Disp: 5 Adjustable Dose Pre-filled Pen Syringe, Rfl: 0    pravastatin (PRAVACHOL) 20 MG tablet, Take 1 tablet by mouth daily, Disp: 90 tablet, Rfl: 1    traZODone (DESYREL) 50 MG tablet, Take 1 tablet by mouth nightly, Disp: 30 tablet, Rfl: 1    empagliflozin (JARDIANCE) 25 MG tablet, Take 1 tablet by mouth daily, Disp: 30 tablet, Rfl: 0    blood glucose test strips (ASCENSIA AUTODISC VI;ONE TOUCH ULTRA TEST VI) strip, Inject 1 each into the skin 2 times daily, Disp: 100 each, Rfl: 2    sertraline (ZOLOFT) 100 MG tablet, TAKE 1 TABLET BY MOUTH ONCE A DAY, Disp: 90 tablet, Rfl: 0    busPIRone (BUSPAR) 15 MG tablet, TAKE 1 TABLET BY MOUTH TWICE DAILY -GENERIC FOR BUSPAR, Disp: 180 tablet, Rfl: 0    metFORMIN (GLUCOPHAGE) 1000 MG tablet, Take 1 tablet by mouth 2 times daily (with meals), Disp: 180 tablet, Rfl: 0    Tirzepatide (MOUNJARO) 2.5 MG/0.5ML SOAJ, Inject 2.5 mg into the skin once a week (Patient not taking: Reported on 3/11/2025), Disp: 2 mL, Rfl: 0    losartan (COZAAR) 50 MG tablet, Take 1 tablet by mouth

## 2025-03-11 NOTE — TELEPHONE ENCOUNTER
Requested Prescriptions     Pending Prescriptions Disp Refills    metFORMIN (GLUCOPHAGE) 1000 MG tablet 180 tablet 0     Sig: Take 1 tablet by mouth 2 times daily (with meals)        Last Visit 2/21/25  Last Refill

## 2025-03-13 ENCOUNTER — PREP FOR PROCEDURE (OUTPATIENT)
Age: 50
End: 2025-03-13

## 2025-03-13 RX ORDER — SODIUM, POTASSIUM,MAG SULFATES 17.5-3.13G
1 SOLUTION, RECONSTITUTED, ORAL ORAL ONCE
Qty: 1 EACH | Refills: 0 | Status: SHIPPED | OUTPATIENT
Start: 2025-03-13 | End: 2025-03-13

## 2025-03-13 NOTE — TELEPHONE ENCOUNTER
Contacted patient to schedule surgery with Dr. Diop. Offered patient 3/28 due to a cancellation, patient accepted. Notified him prep information will be sent in the mail. Patient thanked me for the call.

## 2025-03-14 ENCOUNTER — TELEPHONE (OUTPATIENT)
Dept: PRIMARY CARE CLINIC | Facility: CLINIC | Age: 50
End: 2025-03-14

## 2025-03-14 NOTE — TELEPHONE ENCOUNTER
Received denial- scanned into media, patient needing to having tried TWO preferred GLP1- Ozempic, Trulicity, Victoza before receiving Mounjaro.  Patient has not tried any of the preferred

## 2025-03-18 RX ORDER — EMPAGLIFLOZIN 25 MG/1
25 TABLET, FILM COATED ORAL DAILY
Qty: 30 TABLET | Refills: 1 | Status: SHIPPED | OUTPATIENT
Start: 2025-03-18

## 2025-03-25 ENCOUNTER — TELEPHONE (OUTPATIENT)
Age: 50
End: 2025-03-25

## 2025-03-25 NOTE — TELEPHONE ENCOUNTER
Patient called needs to reschedule 3/28/25 colonoscopy due to lack of transportation. Advised that surgery scheduler would return call.

## 2025-03-25 NOTE — TELEPHONE ENCOUNTER
Cancelled procedure as requested. Attempted to contact patient back to reschedule. No answer, left voicemail for a return call.

## 2025-03-26 ENCOUNTER — TELEPHONE (OUTPATIENT)
Age: 50
End: 2025-03-26

## 2025-03-26 ENCOUNTER — PREP FOR PROCEDURE (OUTPATIENT)
Age: 50
End: 2025-03-26

## 2025-03-26 DIAGNOSIS — Z12.11 SCREENING FOR COLON CANCER: ICD-10-CM

## 2025-03-26 NOTE — TELEPHONE ENCOUNTER
----- Message from Anahi MARTIN sent at 3/26/2025 11:48 AM EDT -----  Regarding: Colonoscopy  Patient rescheduled from 3/28 to 4/21  Stated he already has prep information at home. Not sure if meds have been called in or not yet.

## 2025-03-26 NOTE — TELEPHONE ENCOUNTER
Contacted patient back to reschedule. Patient accepted 4/21. Notified him of arrival time and stated that I will put a new letter in the mail and MyChart. Patient stated that he already has prep information. Patient thanked me.

## 2025-04-04 ENCOUNTER — TELEPHONE (OUTPATIENT)
Age: 50
End: 2025-04-04

## 2025-04-11 DIAGNOSIS — I10 ESSENTIAL (PRIMARY) HYPERTENSION: ICD-10-CM

## 2025-04-12 RX ORDER — LOSARTAN POTASSIUM 50 MG/1
50 TABLET ORAL DAILY
Qty: 30 TABLET | Refills: 1 | Status: SHIPPED | OUTPATIENT
Start: 2025-04-12

## 2025-04-18 ENCOUNTER — TELEPHONE (OUTPATIENT)
Age: 50
End: 2025-04-18

## 2025-04-18 NOTE — TELEPHONE ENCOUNTER
Patient called stating he would like to reschedule surgery due to uncertainty about insurance being effective on 4/21/25. Advised surgery scheduler would return call to reschedule.

## 2025-04-21 ENCOUNTER — PREP FOR PROCEDURE (OUTPATIENT)
Age: 50
End: 2025-04-21

## 2025-04-21 ENCOUNTER — TELEPHONE (OUTPATIENT)
Age: 50
End: 2025-04-21

## 2025-04-21 DIAGNOSIS — Z12.11 SCREENING FOR COLON CANCER: ICD-10-CM

## 2025-04-21 NOTE — TELEPHONE ENCOUNTER
Patient requested first available reschedule date. Offered 5/23 and patient accepted. Notified him of arrival time and stated that I will put a surgical letter in [facility] and mail to home address. Patient stated that he still has the prep and instructions. Patient thanked me for the call back.

## 2025-04-25 PROBLEM — Z12.11 SCREENING FOR COLON CANCER: Status: RESOLVED | Noted: 2025-03-26 | Resolved: 2025-04-25

## 2025-04-29 ENCOUNTER — TELEPHONE (OUTPATIENT)
Age: 50
End: 2025-04-29

## 2025-04-29 NOTE — TELEPHONE ENCOUNTER
----- Message from Anahi MARTIN sent at 4/21/2025 12:54 PM EDT -----  Regarding: Colonoscopy reschedule  Patient rescheduled from 4/21 to 5/23 due to insurance issues.  Patient stated that he already has his prep and instructions.

## 2025-05-07 ENCOUNTER — TELEPHONE (OUTPATIENT)
Age: 50
End: 2025-05-07

## 2025-05-07 NOTE — TELEPHONE ENCOUNTER
Attempted to contact patient to reschedule 5/23 due to Dr. Diop not being in the office. No answer, left voicemail for a return call.

## 2025-05-09 ENCOUNTER — TELEPHONE (OUTPATIENT)
Age: 50
End: 2025-05-09

## 2025-05-09 PROBLEM — Z12.11 SCREENING FOR COLON CANCER: Status: ACTIVE | Noted: 2025-04-21

## 2025-05-09 NOTE — TELEPHONE ENCOUNTER
Contacted patient back regarding insurance. Availity states that plan is inactive and it ended on 3/1/25. Patient states that per his company his insurance should still be active. I gave patient the option to reschedule, cancel or keep the appointment as it and have it be self pay but was unsure of the cost and offered the number to billing. Patient stated that he wants to keep the procedure scheduled as is and will be reaching out to his company for a credit card to pay if it is needed. Patient thanked me for the call.

## 2025-05-09 NOTE — TELEPHONE ENCOUNTER
Contacted patient to reschedule colonoscopy. Patient accepted 5/13. Notified of arrival time and stated that I will send an updated letter via [employer] and home address. Patient thanked me for the call.

## 2025-05-12 ENCOUNTER — TELEPHONE (OUTPATIENT)
Age: 50
End: 2025-05-12

## 2025-05-12 NOTE — TELEPHONE ENCOUNTER
----- Message from Anahi MARTIN sent at 5/9/2025  9:49 AM EDT -----  Regarding: RESCHEDULE  Patient rescheduled from 5/23 to 5/13

## 2025-05-12 NOTE — TELEPHONE ENCOUNTER
Called patient, two patient identifiers used for patient verification, name and .      Went through prep instructions and medications.  Patient is still trying to get his insurance straight may have to cancel.

## 2025-06-08 PROBLEM — Z12.11 SCREENING FOR COLON CANCER: Status: RESOLVED | Noted: 2025-04-21 | Resolved: 2025-06-08

## 2025-06-10 DIAGNOSIS — F32.A ANXIETY AND DEPRESSION: ICD-10-CM

## 2025-06-10 DIAGNOSIS — F41.9 ANXIETY DISORDER, UNSPECIFIED: ICD-10-CM

## 2025-06-10 DIAGNOSIS — F41.9 ANXIETY AND DEPRESSION: ICD-10-CM

## 2025-06-10 RX ORDER — BUSPIRONE HYDROCHLORIDE 15 MG/1
TABLET ORAL
Qty: 180 TABLET | Refills: 0 | Status: SHIPPED | OUTPATIENT
Start: 2025-06-10

## 2025-06-10 RX ORDER — SERTRALINE HYDROCHLORIDE 100 MG/1
100 TABLET, FILM COATED ORAL DAILY
Qty: 90 TABLET | Refills: 0 | Status: SHIPPED | OUTPATIENT
Start: 2025-06-10

## 2025-06-12 DIAGNOSIS — E11.8 DIABETES MELLITUS WITH COMPLICATION (HCC): ICD-10-CM

## 2025-06-12 NOTE — TELEPHONE ENCOUNTER
Patient called to request refills on the following medications:    Jardiance 25mg Tab  Trulicity 0.75mg/0.5ml    To be sent to [facility] on [address] in [place]

## 2025-07-01 DIAGNOSIS — E11.8 DIABETES MELLITUS WITH COMPLICATION (HCC): ICD-10-CM

## 2025-07-01 DIAGNOSIS — E11.8 DIABETES MELLITUS WITH COMPLICATION (HCC): Primary | ICD-10-CM

## 2025-07-01 RX ORDER — INSULIN GLARGINE 100 [IU]/ML
INJECTION, SOLUTION SUBCUTANEOUS
Qty: 3 ML | Refills: 1 | Status: SHIPPED | OUTPATIENT
Start: 2025-07-01

## 2025-08-12 DIAGNOSIS — I10 ESSENTIAL HYPERTENSION: Primary | ICD-10-CM

## 2025-08-12 RX ORDER — LOSARTAN POTASSIUM 50 MG/1
50 TABLET ORAL DAILY
Qty: 90 TABLET | Refills: 0 | Status: SHIPPED | OUTPATIENT
Start: 2025-08-12